# Patient Record
Sex: FEMALE | Race: WHITE | NOT HISPANIC OR LATINO | Employment: OTHER | URBAN - NONMETROPOLITAN AREA
[De-identification: names, ages, dates, MRNs, and addresses within clinical notes are randomized per-mention and may not be internally consistent; named-entity substitution may affect disease eponyms.]

---

## 2020-07-14 ENCOUNTER — TRANSCRIBE ORDERS (OUTPATIENT)
Dept: ADMINISTRATIVE | Facility: HOSPITAL | Age: 65
End: 2020-07-14

## 2020-07-14 DIAGNOSIS — Z01.818 PREOP TESTING: Primary | ICD-10-CM

## 2020-07-17 ENCOUNTER — LAB (OUTPATIENT)
Dept: LAB | Facility: HOSPITAL | Age: 65
End: 2020-07-17

## 2020-07-17 PROCEDURE — C9803 HOPD COVID-19 SPEC COLLECT: HCPCS | Performed by: PAIN MEDICINE

## 2020-07-17 PROCEDURE — U0003 INFECTIOUS AGENT DETECTION BY NUCLEIC ACID (DNA OR RNA); SEVERE ACUTE RESPIRATORY SYNDROME CORONAVIRUS 2 (SARS-COV-2) (CORONAVIRUS DISEASE [COVID-19]), AMPLIFIED PROBE TECHNIQUE, MAKING USE OF HIGH THROUGHPUT TECHNOLOGIES AS DESCRIBED BY CMS-2020-01-R: HCPCS | Performed by: PAIN MEDICINE

## 2020-07-18 LAB
COVID LABCORP PRIORITY: NORMAL
SARS-COV-2 RNA RESP QL NAA+PROBE: NOT DETECTED

## 2020-10-09 ENCOUNTER — HOSPITAL ENCOUNTER (EMERGENCY)
Age: 65
Discharge: HOME | End: 2020-10-09
Attending: PEDIATRICS
Payer: MEDICARE

## 2020-10-09 ENCOUNTER — APPOINTMENT (OUTPATIENT)
Dept: GENERAL RADIOLOGY | Age: 65
End: 2020-10-09
Payer: MEDICARE

## 2020-10-09 VITALS
RESPIRATION RATE: 18 BRPM | HEART RATE: 76 BPM | WEIGHT: 200 LBS | HEIGHT: 62 IN | DIASTOLIC BLOOD PRESSURE: 82 MMHG | TEMPERATURE: 97.8 F | SYSTOLIC BLOOD PRESSURE: 171 MMHG | OXYGEN SATURATION: 99 % | BODY MASS INDEX: 36.8 KG/M2

## 2020-10-09 LAB
ALBUMIN SERPL-MCNC: 4 G/DL (ref 3.5–5.2)
ALP BLD-CCNC: 70 U/L (ref 35–104)
ALT SERPL-CCNC: 32 U/L (ref 5–33)
ANION GAP SERPL CALCULATED.3IONS-SCNC: 13 MMOL/L (ref 7–19)
AST SERPL-CCNC: 67 U/L (ref 5–32)
BACTERIA: NEGATIVE /HPF
BASOPHILS ABSOLUTE: 0 K/UL (ref 0–0.2)
BASOPHILS RELATIVE PERCENT: 0 % (ref 0–1)
BILIRUB SERPL-MCNC: 0.3 MG/DL (ref 0.2–1.2)
BILIRUBIN URINE: NEGATIVE
BLOOD, URINE: NEGATIVE
BUN BLDV-MCNC: 15 MG/DL (ref 8–23)
CALCIUM SERPL-MCNC: 9 MG/DL (ref 8.8–10.2)
CHLORIDE BLD-SCNC: 101 MMOL/L (ref 98–111)
CLARITY: CLEAR
CO2: 22 MMOL/L (ref 22–29)
COLOR: YELLOW
CREAT SERPL-MCNC: 0.7 MG/DL (ref 0.5–0.9)
CRYSTALS, UA: ABNORMAL /HPF
EOSINOPHILS ABSOLUTE: 0 K/UL (ref 0–0.6)
EOSINOPHILS RELATIVE PERCENT: 0 % (ref 0–5)
EPITHELIAL CELLS, UA: 1 /HPF (ref 0–5)
GFR AFRICAN AMERICAN: >59
GFR NON-AFRICAN AMERICAN: >60
GLUCOSE BLD-MCNC: 247 MG/DL (ref 74–109)
GLUCOSE URINE: =>1000 MG/DL
HCT VFR BLD CALC: 38.1 % (ref 37–47)
HEMOGLOBIN: 12 G/DL (ref 12–16)
HYALINE CASTS: 2 /HPF (ref 0–8)
IMMATURE GRANULOCYTES #: 0 K/UL
KETONES, URINE: ABNORMAL MG/DL
LEUKOCYTE ESTERASE, URINE: NEGATIVE
LYMPHOCYTES ABSOLUTE: 1.1 K/UL (ref 1.1–4.5)
LYMPHOCYTES RELATIVE PERCENT: 28.9 % (ref 20–40)
MCH RBC QN AUTO: 26 PG (ref 27–31)
MCHC RBC AUTO-ENTMCNC: 31.5 G/DL (ref 33–37)
MCV RBC AUTO: 82.5 FL (ref 81–99)
MONOCYTES ABSOLUTE: 0.2 K/UL (ref 0–0.9)
MONOCYTES RELATIVE PERCENT: 4.9 % (ref 0–10)
NEUTROPHILS ABSOLUTE: 2.4 K/UL (ref 1.5–7.5)
NEUTROPHILS RELATIVE PERCENT: 65.9 % (ref 50–65)
NITRITE, URINE: NEGATIVE
PDW BLD-RTO: 14.6 % (ref 11.5–14.5)
PH UA: 5 (ref 5–8)
PLATELET # BLD: 174 K/UL (ref 130–400)
PMV BLD AUTO: 9.7 FL (ref 9.4–12.3)
POTASSIUM REFLEX MAGNESIUM: 4.3 MMOL/L (ref 3.5–5)
PROTEIN UA: 30 MG/DL
RBC # BLD: 4.62 M/UL (ref 4.2–5.4)
RBC UA: 0 /HPF (ref 0–4)
SODIUM BLD-SCNC: 136 MMOL/L (ref 136–145)
SPECIFIC GRAVITY UA: 1.03 (ref 1–1.03)
TOTAL PROTEIN: 7.5 G/DL (ref 6.6–8.7)
UROBILINOGEN, URINE: 0.2 E.U./DL
WBC # BLD: 3.7 K/UL (ref 4.8–10.8)
WBC UA: 1 /HPF (ref 0–5)

## 2020-10-09 PROCEDURE — 81001 URINALYSIS AUTO W/SCOPE: CPT

## 2020-10-09 PROCEDURE — 80053 COMPREHEN METABOLIC PANEL: CPT

## 2020-10-09 PROCEDURE — 2580000003 HC RX 258: Performed by: PEDIATRICS

## 2020-10-09 PROCEDURE — 36415 COLL VENOUS BLD VENIPUNCTURE: CPT

## 2020-10-09 PROCEDURE — 85025 COMPLETE CBC W/AUTO DIFF WBC: CPT

## 2020-10-09 PROCEDURE — 71045 X-RAY EXAM CHEST 1 VIEW: CPT

## 2020-10-09 PROCEDURE — 99999 PR OFFICE/OUTPT VISIT,PROCEDURE ONLY: CPT | Performed by: PEDIATRICS

## 2020-10-09 PROCEDURE — 6360000002 HC RX W HCPCS: Performed by: PEDIATRICS

## 2020-10-09 PROCEDURE — 96374 THER/PROPH/DIAG INJ IV PUSH: CPT

## 2020-10-09 PROCEDURE — 99282 EMERGENCY DEPT VISIT SF MDM: CPT

## 2020-10-09 RX ORDER — AZITHROMYCIN 250 MG/1
250 TABLET, FILM COATED ORAL SEE ADMIN INSTRUCTIONS
Qty: 6 TABLET | Refills: 0 | Status: SHIPPED | OUTPATIENT
Start: 2020-10-09 | End: 2020-10-14

## 2020-10-09 RX ORDER — DEXAMETHASONE SODIUM PHOSPHATE 10 MG/ML
6 INJECTION, SOLUTION INTRAMUSCULAR; INTRAVENOUS ONCE
Status: COMPLETED | OUTPATIENT
Start: 2020-10-09 | End: 2020-10-09

## 2020-10-09 RX ORDER — 0.9 % SODIUM CHLORIDE 0.9 %
1000 INTRAVENOUS SOLUTION INTRAVENOUS ONCE
Status: COMPLETED | OUTPATIENT
Start: 2020-10-09 | End: 2020-10-09

## 2020-10-09 RX ADMIN — SODIUM CHLORIDE 1000 ML: 9 INJECTION, SOLUTION INTRAVENOUS at 20:48

## 2020-10-09 RX ADMIN — DEXAMETHASONE SODIUM PHOSPHATE 6 MG: 10 INJECTION INTRAMUSCULAR; INTRAVENOUS at 20:48

## 2020-10-09 NOTE — ED NOTES
Bed: 05  Expected date:   Expected time:   Means of arrival:   Comments:  covid pt coming       Aleksey Price RN  10/09/20 8169

## 2020-10-10 ENCOUNTER — CARE COORDINATION (OUTPATIENT)
Dept: CARE COORDINATION | Age: 65
End: 2020-10-10

## 2020-10-10 NOTE — CARE COORDINATION
Patient contacted regarding [id number] diagnosis\". Discussed COVID-19 related testing which was available at this time. Test results were positive. Patient informed of results, if available? Yes, already aware    Care Transition Nurse/ Ambulatory Care Manager contacted the patient by telephone to perform post discharge assessment. Call within 2 business days of discharge: Yes. Verified name and  with patient as identifiers. Provided introduction to self, and explanation of the CTN/ACM role, and reason for call due to risk factors for infection and/or exposure to COVID-19. Symptoms reviewed with patient who verbalized the following symptoms: fever, cough, shortness of breath, vomiting and diarrhea. Due to worsening symptoms encounter was routed to provider for escalation. Discussed follow-up appointments. If no appointment was previously scheduled, appointment scheduling offered: Pt direct admit to [facility] per PCP  [facility] follow up appointment(s): No future appointments. Non-[facility] follow up appointment(s): N/A    Non-face-to-face services provided:  Provided my contact information and enc pt to reach out with any needs during or after hospitalization     Advance Care Planning:   Does patient have an Advance Directive:  patient declined education. Patient has following risk factors of: COPD and diabetes. CTN/ACM reviewed discharge instructions, medical action plan and red flags such as increased shortness of breath, increasing fever and signs of decompensation with patient who verbalized understanding. Discussed exposure protocols and quarantine with CDC Guidelines What to do if you are sick with coronavirus disease 2019.  Patient was given an opportunity for questions and concerns. The patient agrees to contact the [facility] exposure line [phone], local [facility] department [address] [place] [address] [phone] and PCP office for questions related to their healthcare.  CTN/ACM

## 2020-10-13 ASSESSMENT — ENCOUNTER SYMPTOMS
VOMITING: 0
SHORTNESS OF BREATH: 1
EYE DISCHARGE: 0
ABDOMINAL PAIN: 0
BACK PAIN: 0
COLOR CHANGE: 0
CHEST TIGHTNESS: 1
RHINORRHEA: 0
COUGH: 1
NAUSEA: 0

## 2020-10-13 NOTE — ED PROVIDER NOTES
[facility]  eMERGENCY dEPARTMENT eNCOUnter      Pt Name: Sagrario Reed  MRN: [id number]  [place] 1955  Date of evaluation: 10/9/2020  Provider: Olam Gottron, MD    CHIEF COMPLAINT       Chief Complaint   Patient presents with    Concern For COVID-19     covid positive, states that she is getting worse    Shortness of Breath     o2 sats 99% room air. HISTORY OF PRESENT ILLNESS   (Location/Symptom, Timing/Onset,Context/Setting, Quality, Duration, Modifying Factors, Severity)  Note limiting factors. Sagrario Reed is a 77 y.o. female who presents to the emergency department with shortness of breath. Patient states she has been recently diagnosed with COVID-19. Patient states she has been having difficulty breathing. Patient has a history of COPD/asthma. Patient states that she can hear herself wheezing. Patient states she has been trying to drink sufficient amount of fluids. Patient has been using albuterol. HPI    NursingNotes were reviewed. REVIEW OF SYSTEMS    (2-9 systems for level 4, 10 or more for level 5)     Review of Systems   Constitutional: Positive for fatigue. Negative for chills and fever. HENT: Positive for congestion. Negative for rhinorrhea. Eyes: Negative for discharge. Respiratory: Positive for cough, chest tightness and shortness of breath. Cardiovascular: Negative for chest pain and palpitations. Gastrointestinal: Negative for abdominal pain, nausea and vomiting. Genitourinary: Negative for difficulty urinating and dysuria. Musculoskeletal: Negative for back pain and neck pain. Skin: Negative for color change and pallor. Neurological: Negative for syncope and light-headedness. Psychiatric/Behavioral: Negative for agitation and confusion. All other systems reviewed and are negative.            PAST MEDICALHISTORY     Past Medical History:   Diagnosis Date    Asthma     COPD [facility]ch[facility] obstructive pulmonary disease) [facility] [address]     DDD (degenerative disc disease)     DM (diabetes mellitus) ([facility] [address]     type II    HTN (hypertension)     Hyperthyroidism     Hypothyroidism     Obesity     Sleep apnea          SURGICAL HISTORY       Past Surgical History:   Procedure Laterality Date    DIAGNOSTIC CARDIAC CATH LAB PROCEDURE  11/20/01         CURRENT MEDICATIONS     Discharge Medication List as of 10/9/2020  9:07 PM      CONTINUE these medications which have NOT CHANGED    Details   Insulin Detemir (LEVEMIR SC) Inject into the skinHistorical Med      nebivolol (BYSTOLIC) 10 MG tablet Take  by mouth daily. Historical Med      metformin (GLUCOPHAGE) 1000 MG tablet Take 1,000 mg by mouth 2 times daily (with meals). Historical Med      sitagliptan (JANUVIA) 100 MG tablet Take 100 mg by mouth daily. Historical Med      levothyroxine (SYNTHROID) 150 MCG tablet Take 150 mcg by mouth daily. Historical Med      gabapentin (NEURONTIN) 800 MG tablet Take 800 mg by mouth 3 times daily. Historical Med      triamterene-hydrochlorothiazide (DYAZIDE) 37.5-25 MG per capsule Take 1 capsule by mouth every morning. Historical Med      Multiple Vitamins-Minerals (CENTRUM SILVER PO) Take  by mouth. Historical Med      fish oil-omega-3 fatty acids 1000 MG capsule Take 2 g by mouth daily. Historical Med      Calcium + D (CALTRATE) 600-200 MG-UNIT tablet Take 1 tablet by mouth daily. Historical Med             ALLERGIES     Niaspan [niacin]    FAMILY HISTORY       Family History   Problem Relation Age of Onset    Coronary Art Dis Mother     Hypertension Mother     Stroke Mother     Cancer Mother     Coronary Art Dis Father     Diabetes Father     Hypertension Father     Cancer Father           SOCIAL HISTORY       Social History     Socioeconomic History    Marital status:       Spouse name: None    Number of children: None    Years of education: None    Highest education level: None   Occupational History    None   Social Needs    Financial normal.      Breath sounds: Wheezing present. Comments: Diminished air entry bilaterally without wheezes. No increased work of breathing. Saturations are 99% on room air. Abdominal:      General: Bowel sounds are normal.      Palpations: Abdomen is soft. Tenderness: There is no abdominal tenderness. There is no guarding. Musculoskeletal:         General: No tenderness or deformity. Skin:     General: Skin is warm and dry. Capillary Refill: Capillary refill takes less than 2 seconds. Coloration: Skin is not jaundiced. Neurological:      General: No focal deficit present. Mental Status: She is alert and oriented to person, place, and time. Mental status is at baseline. Coordination: Coordination normal.   Psychiatric:         Mood and Affect: Mood normal.         Behavior: Behavior normal.         DIAGNOSTIC RESULTS     RADIOLOGY:  Non-plain film images such as CT, Ultrasound and MRI are read by the radiologist. Plain radiographic images are visualized and preliminarily interpreted bythe emergency physician with the below findings:      XR CHEST PORTABLE   Final Result   Impression:   1. Low lung volumes. 2.  Nonspecific coarsening of interstitium, mostly in the lower lung   zones, may be seen in Covid 19 infection.    Signed by Dr Kassy Padgett on 10/9/2020 7:32 PM              LABS:  Labs Reviewed   CBC WITH AUTO DIFFERENTIAL - Abnormal; Notable for the following components:       Result Value    WBC 3.7 (*)     MCH 26.0 (*)     MCHC 31.5 (*)     RDW 14.6 (*)     Neutrophils % 65.9 (*)     All other components within normal limits   COMPREHENSIVE METABOLIC PANEL W/ REFLEX TO MG FOR LOW K - Abnormal; Notable for the following components:    Glucose 247 (*)     AST 67 (*)     All other components within normal limits   URINE RT REFLEX TO CULTURE - Abnormal; Notable for the following components:    Ketones, Urine TRACE (*)     Protein, UA 30 (*)     All other components within normal limits   MICROSCOPIC URINALYSIS - Abnormal; Notable for the following components:    Bacteria, UA NEGATIVE (*)     Crystals, UA NEG (*)     All other components within normal limits       All other labs were within normal range or not returned as of this dictation. EMERGENCY DEPARTMENT COURSE and DIFFERENTIAL DIAGNOSIS/MDM:   Vitals:    Vitals:    10/09/20 1812   BP: (!) 171/82   Pulse: 76   Resp: 18   Temp: 97.8 °F (36.6 °C)   SpO2: 99%   Weight: 200 lb (90.7 kg)   Height: 5' 2\" (1.575 m)       MDM  70-year-old female with history of asthma/COPD presents with shortness of breath secondary to COVID-19. Lab and radiology results reviewed. Patient given DuoNeb and Decadron in the emergency department. Patient saturations are 99% on room air. Patient will be discharged home to follow-up with Dr. Savage Gloria. Patient will return with chest pain, difficulty breathing, or other concerns. CONSULTS:  None    PROCEDURES:  Unless otherwise noted below, none     Procedures    FINAL IMPRESSION      1. COVID-19 virus infection    2. Pneumonia due to COVID-19 virus          DISPOSITION/PLAN   DISPOSITION Decision To Discharge 10/09/2020 08:43:34 PM      PATIENT REFERRED TO:  No follow-up provider specified.     DISCHARGE MEDICATIONS:  Discharge Medication List as of 10/9/2020  9:07 PM      START taking these medications    Details   azithromycin (ZITHROMAX) 250 MG tablet Take 1 tablet by mouth See Admin Instructions for 5 days 500mg on day 1 followed by 250mg on days 2 - 5, Disp-6 tablet,R-0Print      albuterol (PROVENTIL) (5 MG/ML) 0.5% nebulizer solution Take 0.5 mLs by nebulization every 6 hours as needed for Wheezing, Disp-120 each,R-0Print                (Please note that portions of this note were completed with a voice recognition program.  Efforts were made to edit thedictations but occasionally words are mis-transcribed.)    Fanny Negrete MD (electronically signed)  Attending Emergency Physician

## 2020-10-16 ENCOUNTER — CARE COORDINATION (OUTPATIENT)
Dept: CARE COORDINATION | Age: 65
End: 2020-10-16

## 2022-10-25 ENCOUNTER — OFFICE VISIT (OUTPATIENT)
Dept: GASTROENTEROLOGY | Facility: CLINIC | Age: 67
End: 2022-10-25

## 2022-10-25 VITALS
DIASTOLIC BLOOD PRESSURE: 64 MMHG | TEMPERATURE: 94.4 F | WEIGHT: 236 LBS | HEIGHT: 60 IN | SYSTOLIC BLOOD PRESSURE: 142 MMHG | HEART RATE: 67 BPM | OXYGEN SATURATION: 97 % | BODY MASS INDEX: 46.33 KG/M2

## 2022-10-25 DIAGNOSIS — R13.14 PHARYNGOESOPHAGEAL DYSPHAGIA: Primary | ICD-10-CM

## 2022-10-25 DIAGNOSIS — D68.9 COAGULOPATHY: ICD-10-CM

## 2022-10-25 DIAGNOSIS — Z86.010 HX OF COLONIC POLYP: ICD-10-CM

## 2022-10-25 DIAGNOSIS — Z83.71 FAMILY HX COLONIC POLYPS: ICD-10-CM

## 2022-10-25 PROCEDURE — 99204 OFFICE O/P NEW MOD 45 MIN: CPT | Performed by: NURSE PRACTITIONER

## 2022-10-25 NOTE — PROGRESS NOTES
"[facility] GASTROENTEROLOGY - OFFICE NOTE    10/25/2022    Aida ROSS   1955    Primary Physician: Chey Catherine APRN    Chief Complaint   Patient presents with   • Difficulty Swallowing         HISTORY OF PRESENT ILLNESS:     Aida ROSS is a 67 y.o. female presents with dysphagia. This started 6 mo ago. She points to the neck area where meds and solid foods will hang. Taking a drink does not help. Has had to cough to get relief.  Takes protonix daily that does control reflux. No weight loss. No hematemesis.   No fever.     She is on eliquis. Prescribed by Chey Catherine NP.     She has history gastric sleeve 2014.       Last egd was around 3 year ago done in [place]. That was \" ok\" then.   Last colonoscopy 3 year ago in [place] and was \" ok \" then. She has had colon polyps in the past. Recommended repeat colonoscopy 10 year.   No family history of colon cancer.   Father had colon polyps.        Past Medical History:   Diagnosis Date   • Atrial fibrillation (HCC)    • Coughing blood    • Diabetes mellitus (HCC)    • Disease of thyroid gland    • Food in larynx causing asphyxiation, initial encounter    • GERD (gastroesophageal reflux disease)    • Hyperlipidemia    • Restless leg    • Vitamin B deficiency    • Vitamin D deficiency        Past Surgical History:   Procedure Laterality Date   • GASTRIC SLEEVE LAPAROSCOPIC     • TUBAL ABDOMINAL LIGATION         Outpatient Medications Marked as Taking for the 10/25/22 encounter (Office Visit) with Alma Richard APRN   Medication Sig Dispense Refill   • ALBUTEROL IN Inhale.     • amiodarone (PACERONE) 100 MG tablet Take 100 mg by mouth Daily.     • apixaban (ELIQUIS) 5 MG tablet tablet Take 5 mg by mouth 2 (Two) Times a Day.     • atorvastatin (LIPITOR) 40 MG tablet Take 40 mg by mouth Daily.     • B Complex Vitamins (VITAMIN B-COMPLEX PO) Take  by mouth Daily.     • CALCIUM PO Take  by mouth Daily.     • carvedilol (COREG) 25 MG tablet Take 25 mg " by mouth 2 (Two) Times a Day With Meals.     • Cholecalciferol (VITAMIN D3 PO) Take  by mouth.     • DULoxetine (CYMBALTA) 30 MG capsule Take 30 mg by mouth Daily.     • ezetimibe (ZETIA) 10 MG tablet Take 10 mg by mouth Daily.     • ferrous sulfate 325 (65 FE) MG tablet Take 325 mg by mouth Daily With Breakfast.     • furosemide (LASIX) 20 MG tablet Take 20 mg by mouth Daily.     • gabapentin (NEURONTIN) 300 MG capsule Take 300 mg by mouth 3 (Three) Times a Day.     • HYDROcodone-acetaminophen (NORCO)  MG per tablet Take 1 tablet by mouth Every 6 (Six) Hours As Needed for Moderate Pain.     • insulin detemir (LEVEMIR) 100 UNIT/ML injection Inject  under the skin into the appropriate area as directed 2 (Two) Times a Day.     • isosorbide mononitrate (IMDUR) 30 MG 24 hr tablet Take 30 mg by mouth Daily.     • levothyroxine (SYNTHROID, LEVOTHROID) 88 MCG tablet Take 88 mcg by mouth Daily.     • nitroglycerin (NITROSTAT) 0.3 MG SL tablet Place 0.3 mg under the tongue Every 5 (Five) Minutes As Needed for Chest Pain. Take no more than 3 doses in 15 minutes.     • pantoprazole (PROTONIX) 40 MG EC tablet Take 40 mg by mouth Daily.     • rOPINIRole (REQUIP) 0.5 MG tablet Take 0.5 mg by mouth Every Night. Take 1 hour before bedtime.     • SITagliptin (JANUVIA) 100 MG tablet Take 100 mg by mouth Daily.     • vitamin D (ERGOCALCIFEROL) 1.25 MG (08800 UT) capsule capsule Take 50,000 Units by mouth 1 (One) Time Per Week.         No Known Allergies    Social History     Socioeconomic History   • Marital status:    Tobacco Use   • Smoking status: Never   • Smokeless tobacco: Never   Substance and Sexual Activity   • Alcohol use: Not Currently   • Drug use: Not Currently   • Sexual activity: Defer       Family History   Problem Relation Age of Onset   • Colon polyps Father    • Prostate cancer Father    • Colon cancer Neg Hx        Review of Systems   Constitutional: Negative for chills, fever and unexpected weight  "change.   HENT: Positive for trouble swallowing.    Respiratory: Negative for shortness of breath.    Cardiovascular: Negative for chest pain.   Gastrointestinal: Negative for abdominal distention, abdominal pain, anal bleeding, blood in stool, constipation, diarrhea, nausea and vomiting.        Vitals:    10/25/22 0822   BP: 142/64   Pulse: 67   Temp: 94.4 °F (34.7 °C)   SpO2: 97%   Weight: 107 kg (236 lb)   Height: 152.4 cm (60\")      Body mass index is 46.09 kg/m².    Physical Exam  Vitals reviewed.   Constitutional:       General: She is not in acute distress.  Cardiovascular:      Rate and Rhythm: Normal rate and regular rhythm.      Heart sounds: Normal heart sounds.   Pulmonary:      Effort: Pulmonary effort is normal.      Breath sounds: Normal breath sounds.   Abdominal:      General: Bowel sounds are normal. There is no distension.      Palpations: Abdomen is soft.      Tenderness: There is no abdominal tenderness.   Skin:     General: Skin is warm and dry.   Neurological:      Mental Status: She is alert.         Results for orders placed or performed in visit on 07/14/20   COVID-19,[facility] ROUTINE, NP/OP SWAB IN TRANSPORT MEDIA OR ESWAB 72 HR TAT - Swab, Oropharynx    Specimen: Oropharynx; Swab   Result Value Ref Range    SARS-CoV-2, NIKOLE Not Detected Not Detected   COVID [facility] Priority - Swab, Oropharynx    Specimen: Oropharynx; Swab   Result Value Ref Range    COVID [facility] PRIORITY Comment            ASSESSMENT AND PLAN    Assessment & Plan     Diagnoses and all orders for this visit:    1. Pharyngoesophageal dysphagia (Primary)    2. Hx of colonic polyp    3. Family hx colonic polyps    4. Coagulopathy (HCC)  Comments:  eliquis.               In regards to dysphagia, differential diagnosis discussed.  I recommend cut food in small pieces and chew well.  I recommend upper endoscopy for further evaluation and the patient is agreeable.  However prior to scheduling I will send a letter to Chey Catherine " NP  in regards to if the patient can safely hold eliquis  48 hours days prior to procedure. I will contact patient once he has responded.  I have instructed the patient to contact our office if she has not heard from us within 2 weeks.           The patient was advised on the risks of stopping blood thinners for a procedure.  The risks discussed included the risk of developing myocardial infarction, CVA, embolus, clogging of the arteries or stents, etc.  We discussed the potential consequences of the risks discussed.  The benefits of stopping as well as the alternatives were discussed as well.   Patient is to hold their anticoagulation medication per the direction of their prescribing physician, Chey Catherine NP.    A letter will be sent to prescribing This is to prevent any risk or complication from bleeding intra and post procedure. If they develop bleeding post procedure they are to go the emergency department for further evaluation and treatment immediately.         Last colonoscopy was 3 years ago.  We will request last colonoscopy with path report for review.  She was placed on 10-year recall for colonoscopy.         CHAIM Leyva    Received records :   Colonoscopy 8-7-19  By Dr. Edson Blood , this was to the proximal ascending colon , colon was elongated and tortuous.  Recommended colonoscopy in 5 years.

## 2022-10-26 ENCOUNTER — TELEPHONE (OUTPATIENT)
Dept: GASTROENTEROLOGY | Facility: CLINIC | Age: 67
End: 2022-10-26

## 2022-11-15 ENCOUNTER — OFFICE VISIT (OUTPATIENT)
Dept: CARDIOLOGY | Facility: CLINIC | Age: 67
End: 2022-11-15

## 2022-11-15 VITALS
BODY MASS INDEX: 45.94 KG/M2 | DIASTOLIC BLOOD PRESSURE: 77 MMHG | SYSTOLIC BLOOD PRESSURE: 132 MMHG | OXYGEN SATURATION: 95 % | HEIGHT: 60 IN | WEIGHT: 234 LBS | HEART RATE: 57 BPM

## 2022-11-15 DIAGNOSIS — E11.65 UNCONTROLLED TYPE 2 DIABETES MELLITUS WITH HYPERGLYCEMIA: ICD-10-CM

## 2022-11-15 DIAGNOSIS — E78.2 MIXED HYPERLIPIDEMIA: ICD-10-CM

## 2022-11-15 DIAGNOSIS — R07.9 CHEST PAIN IN ADULT: ICD-10-CM

## 2022-11-15 DIAGNOSIS — R06.09 DOE (DYSPNEA ON EXERTION): ICD-10-CM

## 2022-11-15 DIAGNOSIS — Z87.892: Primary | ICD-10-CM

## 2022-11-15 DIAGNOSIS — E66.01 MORBID OBESITY WITH BMI OF 40.0-44.9, ADULT: ICD-10-CM

## 2022-11-15 DIAGNOSIS — R13.19 OTHER DYSPHAGIA: ICD-10-CM

## 2022-11-15 DIAGNOSIS — R00.2 PALPITATIONS: ICD-10-CM

## 2022-11-15 DIAGNOSIS — R94.39 ABNORMAL NUCLEAR STRESS TEST: ICD-10-CM

## 2022-11-15 PROCEDURE — 93000 ELECTROCARDIOGRAM COMPLETE: CPT | Performed by: INTERNAL MEDICINE

## 2022-11-15 PROCEDURE — 99204 OFFICE O/P NEW MOD 45 MIN: CPT | Performed by: INTERNAL MEDICINE

## 2022-11-15 RX ORDER — BUDESONIDE AND FORMOTEROL FUMARATE DIHYDRATE 160; 4.5 UG/1; UG/1
2 AEROSOL RESPIRATORY (INHALATION)
COMMUNITY

## 2022-11-15 NOTE — PROGRESS NOTES
Aida ROSS  [id number]  1955  67 y.o.  female    Referring Provider: Chey Catherine APRN    Reason for  Visit:  Initial visit         Subjective     Chest pain both with exertion as well as at rest.  Feels episodes of chest pain occur no more often with exertion  Moderate substernal,   Pressure like   Chest pain non pleuritic  Chest pain non positional and non gustatory   Lasts less than 5 minutes to hours    Started more than 2 years ago  Occurs once or twice a month on the average but can be variable in frequency  Associated diaphoresis  Associated nausea  Radiation to left arm      Relieved with rest or spontaneously  Not positional    No change with intake of food or antacids  No change with breathing  Mild to moderate associated dyspnea    No similar chest pain episodes in the past     Intermittent palpitations, once every several days to several weeks lasting for less than 1 minute  No associated symptoms of dizziness, weakness, chest pain,  shortness of breath      Easy fatiguability and increasing tired  Feels energy levels running low      No bleeding, excessive bruising, gait instability or fall risks    Has moderate snoring with daytime fatigue    Intolerant to CPAP         History of present illness:  Aida ROSS is a 67 y.o. yo female with Type 2 diabetes mellitus  who presents today for   Chief Complaint   Patient presents with   • Establish Care     NEW PT:CHEY RUCKER   • Atrial Fibrillation   • Chest Pain     That radiated to left arm. Light pressure on chest.   .    History  Past Medical History:   Diagnosis Date   • Atrial fibrillation (HCC)    • Coughing blood    • Diabetes mellitus (HCC)    • Disease of thyroid gland    • Food in larynx causing asphyxiation, initial encounter    • GERD (gastroesophageal reflux disease)    • Hyperlipidemia    • Restless leg    • Vitamin B deficiency    • Vitamin D deficiency    ,   Past Surgical History:   Procedure Laterality Date   • GASTRIC  SLEEVE LAPAROSCOPIC     • TUBAL ABDOMINAL LIGATION     ,   Family History   Problem Relation Age of Onset   • Hypertension Mother    • Heart disease Mother    • Hypertension Father    • Heart disease Father    • Colon polyps Father    • Prostate cancer Father    • Hypertension Sister    • Heart attack Sister    • Colon cancer Neg Hx    ,   Social History     Tobacco Use   • Smoking status: Never   • Smokeless tobacco: Never   Vaping Use   • Vaping Use: Never used   Substance Use Topics   • Alcohol use: Not Currently   • Drug use: Not Currently   ,     Medications  Current Outpatient Medications   Medication Sig Dispense Refill   • ALBUTEROL IN Inhale.     • amiodarone (PACERONE) 100 MG tablet Take 100 mg by mouth Daily.     • apixaban (ELIQUIS) 5 MG tablet tablet Take 5 mg by mouth 2 (Two) Times a Day.     • atorvastatin (LIPITOR) 40 MG tablet Take 80 mg by mouth Every Night.     • B Complex Vitamins (VITAMIN B-COMPLEX PO) Take  by mouth Daily.     • budesonide-formoterol (SYMBICORT) 160-4.5 MCG/ACT inhaler Inhale 2 puffs 2 (Two) Times a Day.     • CALCIUM PO Take  by mouth Daily.     • carvedilol (COREG) 25 MG tablet Take 25 mg by mouth 2 (Two) Times a Day With Meals.     • Cholecalciferol (VITAMIN D3 PO) Take  by mouth.     • DULoxetine (CYMBALTA) 30 MG capsule Take 30 mg by mouth Daily.     • ezetimibe (ZETIA) 10 MG tablet Take 10 mg by mouth Daily.     • ferrous sulfate 325 (65 FE) MG tablet Take 325 mg by mouth Daily With Breakfast.     • furosemide (LASIX) 20 MG tablet Take 20 mg by mouth Daily.     • gabapentin (NEURONTIN) 300 MG capsule Take 300 mg by mouth 3 (Three) Times a Day.     • HYDROcodone-acetaminophen (NORCO)  MG per tablet Take 1 tablet by mouth Every 6 (Six) Hours As Needed for Moderate Pain.     • insulin detemir (LEVEMIR) 100 UNIT/ML injection Inject  under the skin into the appropriate area as directed 2 (Two) Times a Day.     • isosorbide mononitrate (IMDUR) 30 MG 24 hr tablet Take 30  "mg by mouth Daily.     • levothyroxine (SYNTHROID, LEVOTHROID) 88 MCG tablet Take 88 mcg by mouth Daily.     • nitroglycerin (NITROSTAT) 0.3 MG SL tablet Place 0.3 mg under the tongue Every 5 (Five) Minutes As Needed for Chest Pain. Take no more than 3 doses in 15 minutes.     • pantoprazole (PROTONIX) 40 MG EC tablet Take 40 mg by mouth Daily.     • rOPINIRole (REQUIP) 0.5 MG tablet Take 0.5 mg by mouth Every Night. Take 1 hour before bedtime.     • Semaglutide,0.25 or 0.5MG/DOS, (Ozempic, 0.25 or 0.5 MG/DOSE,) 2 MG/1.5ML solution pen-injector Inject  under the skin into the appropriate area as directed 1 (One) Time Per Week.     • SITagliptin (JANUVIA) 100 MG tablet Take 100 mg by mouth Daily.     • vitamin D (ERGOCALCIFEROL) 1.25 MG (71132 UT) capsule capsule Take 50,000 Units by mouth 1 (One) Time Per Week.       No current facility-administered medications for this visit.       Allergies:  Isosorb dinitrate-hydralazine and Niacin    Review of Systems  Review of Systems   Constitutional: Negative.   HENT: Negative.    Eyes: Negative.    Cardiovascular: Positive for chest pain, dyspnea on exertion and palpitations. Negative for claudication, cyanosis, irregular heartbeat, leg swelling, near-syncope, orthopnea, paroxysmal nocturnal dyspnea and syncope.   Respiratory: Positive for snoring.    Endocrine: Negative.    Hematologic/Lymphatic: Negative.    Skin: Negative.    Musculoskeletal: Positive for arthritis, back pain and joint pain.   Gastrointestinal: Negative for anorexia.   Genitourinary: Negative.    Neurological: Negative.    Psychiatric/Behavioral: Negative.        Objective     Physical Exam:  /77 (BP Location: Left arm, Patient Position: Sitting, Cuff Size: Large Adult)   Pulse 57   Ht 152.4 cm (60\")   Wt 106 kg (234 lb)   SpO2 95%   BMI 45.70 kg/m²     Physical Exam  Constitutional:       Appearance: She is well-developed.   HENT:      Head: Normocephalic.   Neck:      Vascular: Normal " carotid pulses. No carotid bruit or JVD.      Trachea: No tracheal tenderness or tracheal deviation.   Cardiovascular:      Rate and Rhythm: Regular rhythm.      Pulses: Normal pulses.      Heart sounds: Normal heart sounds.   Pulmonary:      Effort: Pulmonary effort is normal.      Breath sounds: No stridor.   Abdominal:      Palpations: Abdomen is soft.   Musculoskeletal:      Cervical back: No edema.   Skin:     General: Skin is warm.   Neurological:      Mental Status: She is alert.      Cranial Nerves: No cranial nerve deficit.      Sensory: No sensory deficit.   Psychiatric:         Speech: Speech normal.         Behavior: Behavior normal.         Results Review:      Stress Test With PET Myocardial Perfusion  Order: [id number]  [address] [address]                                     [phone]     Rb-82 PET/CT Regadenoson (Lexiscan) Myocardial Perfusion Imaging     Patient:   Aida Benitez              Study Date:        2021   MRN:       [id number]                         Acct#:             [id number]   :       1955 (65yrs)              Order#:            [id number]   Gender:    F                               Pt Location:       Outpatient ( )     Ref Phys:  Thomas Wise MD, Vadzim MD   Ord Phys:  Thomas Wise MD   Int Phys:  Yoav Calero MD     Reason for Exam:     --------------------------------------------------------------------------------   INDICATIONS:  Elevated Troponin Level.  Dyspnea.     --------------------------------------------------------------------------------   HISTORY:   PMH:  atrial fibrillation, COPD, Bronchitis, Home Oxygen Use.  Risk   factors:  Family history of coronary artery disease. Hypertension. Diabetes   mellitus. Dyslipidemia.  Medications:  Beta blockers.      --------------------------------------------------------------------------------   IMPRESSION:     1. Negative ECG response to stress.   2. Study suggests myocardial infarction, affecting a moderate-sized regionin the      territory of the left anterior descending coronary artery.   3. There is mild perez-infarct ischemia in the same territory.   4. Normal left ventricular systolic function, calculated post stress ejection      fraction is 61 % .   5. Abnormal gobal myocardial flow reserve at 1.8 with left anterior descending      coronary territory having flow of 1.6.   6. Coronary calcifications noted.   7. Overall intermediate risk study.        ____________________________________________________________________________________________________________________________________________  Health maintenance and recommendations    Low salt/ HTN/ Heart healthy carbohydrate restricted cardiac diet   The patient is advised to reduce or avoid caffeine or other cardiac stimulants.   Minimize or avoid  NSAID-type medications      Monitor for any signs of bleeding including red or dark stools. Fall precautions.   Advised staying uptodate with immunizations per established standard guidelines.    Offered to give patient  a copy of my notes     Questions were encouraged, asked and answered to the patient's  understanding and satisfaction. Questions if any regarding current medications and side effects, need for refills and importance of compliance to medications stressed.    Reviewed available prior notes, consults, prior visits, laboratory findings, radiology and cardiology relevant reports. Updated chart as applicable. I have reviewed the patient's medical history in detail and updated the computerized patient record as relevant.      Updated patient regarding any new or relevant abnormalities on review of records or any new findings on physical exam. Mentioned to patient about purpose of visit and desirable health short  and long term goals and objectives.    Primary to monitor CBC CMP Lipid panel and TSH as applicable    ___________________________________________________________________________________________________________________________________________     ECG 12 Lead    Date/Time: 11/15/2022 10:00 AM  Performed by: Landon Denson MD  Authorized by: Landon Denson MD   Comparison: not compared with previous ECG   Rhythm: sinus bradycardia  Rate: bradycardic  Other findings: prolonged QTc interval    Clinical impression: abnormal EKG            Assessment & Plan   Diagnoses and all orders for this visit:    1. History of anaphylaxis to COVID-19 vaccine (Primary)    2. MCNEILL (dyspnea on exertion)  -     Adult Transthoracic Echo Complete w/ Color, Spectral and Contrast if necessary per protocol; Future    3. Morbid obesity with BMI of 40.0-44.9, adult ([facility])    4. Chest pain in adult  -     Stress Test With Myocardial Perfusion One Day; Future    5. Palpitations  -     Holter Monitor - 72 Hour Up To 15 Days; Future    6. Uncontrolled type 2 diabetes mellitus with hyperglycemia (HCC)    7. Mixed hyperlipidemia    8. Other dysphagia    9. Abnormal nuclear stress test    Other orders  -     ECG 12 Lead          Plan    Orders Placed This Encounter   Procedures   • Holter Monitor - 72 Hour Up To 15 Days     Standing Status:   Future     Standing Expiration Date:   11/15/2023     Order Specific Question:   Reason for exam?     Answer:   Palpitations     Order Specific Question:   Release to patient     Answer:   Routine Release     Order Specific Question:   How many days is the patient to wear the monitor?     Answer:   14   • Stress Test With Myocardial Perfusion One Day     Standing Status:   Future     Standing Expiration Date:   11/15/2023     Order Specific Question:   What stress agent will be used?     Answer:   Regadenoson (Lexiscan)     Order Specific Question:   Difficulty walking criteria?     Answer:   Musculoskeletal (hips,  knees, feet, back, amputee)     Order Specific Question:   Reason for exam?     Answer:   Chest Pain   • ECG 12 Lead     This order was created via procedure documentation     Order Specific Question:   Release to patient     Answer:   Routine Release   • Adult Transthoracic Echo Complete w/ Color, Spectral and Contrast if necessary per protocol     Standing Status:   Future     Standing Expiration Date:   11/15/2023     Scheduling Instructions:      Myocardial strain to be performed during echocardiogram as long as technically feasible     Order Specific Question:   Reason for exam?     Answer:   Dyspnea     Order Specific Question:   Release to patient     Answer:   Routine Release        Patient expressed understanding  Encouraged and answered all questions   Discussed with the patient and all questioned fully answered. She will call me if any problems arise.   Discussed results of prior testing with patient : myocardial perfusion scan    as well electrocardiogram from today       Keep A1c less than 7 Primary to monitor  Keep LDL below 70 mg/dl. Monitor liver and renal functions.   Monitor CBC, CMP, TSH (as indicated) and Lipid Panel by primary      Due to see GI for dysphagia     Intolerant to CPAP           Return in about 6 weeks (around 12/27/2022).

## 2022-11-18 ENCOUNTER — TELEPHONE (OUTPATIENT)
Dept: GASTROENTEROLOGY | Facility: CLINIC | Age: 67
End: 2022-11-18

## 2022-11-18 NOTE — TELEPHONE ENCOUNTER
Please contact Chey Catherine NP office and let them know we have faxed letter ( 10-25-22)  asking if the patient can be off of eliquis for 48 hours prior to an egd. You can get a verbal on that. Thank you

## 2022-11-21 ENCOUNTER — TELEPHONE (OUTPATIENT)
Dept: GASTROENTEROLOGY | Facility: CLINIC | Age: 67
End: 2022-11-21

## 2022-11-21 NOTE — TELEPHONE ENCOUNTER
Karen with Chey Catherine is reviewing the pts chart and she will fax our letter back to us.   To my surprise, patient's Lyme test is totally negative  Ask patient if he is any better  I expect that he is since they did not get a call from him saying that he was worse

## 2022-11-21 NOTE — TELEPHONE ENCOUNTER
Karen with Chey Catherine left a VM stating Chey referred pt to Dr Denson for a fib and Dr Denson saw her on 11/15. She is scheduled for a stress test and Chey is going to wait until Dr Denson finishes before giving the ok for pt to have procedure.

## 2022-11-22 ENCOUNTER — TELEPHONE (OUTPATIENT)
Dept: CARDIOLOGY | Facility: CLINIC | Age: 67
End: 2022-11-22

## 2022-11-22 NOTE — TELEPHONE ENCOUNTER
Caller: Aida ROSS     Relationship: SELF    Best call back number: [phone]    What is your medical concern? PATIENT WAS WEARING A HOLTER AND IT CREATED A SORE ON HER BREAST, WANTS TO KNOW IF DR. CHUN CAN SEND SOMETHING IN TO TREAT THIS AREA DUE TO HER BEING A DIABETIC. SHE WAS INSTRUCTED TO TAKE HOLTER OFF AND SEND HOLTER BACK.     How long has this issue been going on? 11/19/22    Is your provider already aware of this issue? NO    Have you been treated for this issue? NO

## 2022-11-22 NOTE — TELEPHONE ENCOUNTER
"Call returned to patient. She has not mailed monitor as \"the light is still blinking\". I have instructed to mail heart monitor. Keep area where monitor was on skin washed with mild non perfumed soap, keep clean and dry, do not apply adhesive to skin, apply triple antibiotic,cover with gauze, monitor for discolored drainage.V/U  "

## 2022-12-16 ENCOUNTER — HOSPITAL ENCOUNTER (OUTPATIENT)
Dept: CARDIOLOGY | Facility: HOSPITAL | Age: 67
Discharge: HOME | End: 2022-12-16

## 2022-12-16 VITALS
HEIGHT: 60 IN | SYSTOLIC BLOOD PRESSURE: 142 MMHG | DIASTOLIC BLOOD PRESSURE: 58 MMHG | BODY MASS INDEX: 45.94 KG/M2 | WEIGHT: 234 LBS

## 2022-12-16 VITALS — HEART RATE: 54 BPM | SYSTOLIC BLOOD PRESSURE: 146 MMHG | DIASTOLIC BLOOD PRESSURE: 53 MMHG

## 2022-12-16 DIAGNOSIS — R07.9 CHEST PAIN IN ADULT: ICD-10-CM

## 2022-12-16 DIAGNOSIS — R06.09 DOE (DYSPNEA ON EXERTION): ICD-10-CM

## 2022-12-16 LAB
BH CV NUCLEAR PRIOR STUDY: 3
BH CV REST NUCLEAR ISOTOPE DOSE: 10.6 MCI
BH CV STRESS BP STAGE 1: NORMAL
BH CV STRESS COMMENTS STAGE 1: NORMAL
BH CV STRESS DOSE REGADENOSON STAGE 1: 0.4
BH CV STRESS DURATION MIN STAGE 1: 0
BH CV STRESS DURATION SEC STAGE 1: 10
BH CV STRESS HR STAGE 1: 56
BH CV STRESS NUCLEAR ISOTOPE DOSE: 35 MCI
BH CV STRESS PROTOCOL 1: NORMAL
BH CV STRESS RECOVERY BP: NORMAL MMHG
BH CV STRESS RECOVERY HR: 47 BPM
BH CV STRESS STAGE 1: 1
LV EF NUC BP: 67 %
MAXIMAL PREDICTED HEART RATE: 151 BPM
PERCENT MAX PREDICTED HR: 37.09 %
STRESS BASELINE BP: NORMAL MMHG
STRESS BASELINE HR: 54 BPM
STRESS PERCENT HR: 44 %
STRESS POST PEAK BP: NORMAL MMHG
STRESS POST PEAK HR: 56 BPM
STRESS TARGET HR: 128 BPM

## 2022-12-16 PROCEDURE — 78452 HT MUSCLE IMAGE SPECT MULT: CPT | Performed by: INTERNAL MEDICINE

## 2022-12-16 PROCEDURE — 0 TECHNETIUM TETROFOSMIN KIT: Performed by: INTERNAL MEDICINE

## 2022-12-16 PROCEDURE — 93018 CV STRESS TEST I&R ONLY: CPT | Performed by: INTERNAL MEDICINE

## 2022-12-16 PROCEDURE — A9502 TC99M TETROFOSMIN: HCPCS | Performed by: INTERNAL MEDICINE

## 2022-12-16 PROCEDURE — 25010000002 REGADENOSON 0.4 MG/5ML SOLUTION: Performed by: INTERNAL MEDICINE

## 2022-12-16 PROCEDURE — 93306 TTE W/DOPPLER COMPLETE: CPT | Performed by: INTERNAL MEDICINE

## 2022-12-16 PROCEDURE — 25010000002 PERFLUTREN 6.52 MG/ML SUSPENSION: Performed by: INTERNAL MEDICINE

## 2022-12-16 PROCEDURE — 93306 TTE W/DOPPLER COMPLETE: CPT

## 2022-12-16 PROCEDURE — 78452 HT MUSCLE IMAGE SPECT MULT: CPT

## 2022-12-16 PROCEDURE — 93017 CV STRESS TEST TRACING ONLY: CPT

## 2022-12-16 RX ADMIN — TETROFOSMIN 1 DOSE: 1.38 INJECTION, POWDER, LYOPHILIZED, FOR SOLUTION INTRAVENOUS at 11:19

## 2022-12-16 RX ADMIN — REGADENOSON 0.4 MG: 0.08 INJECTION, SOLUTION INTRAVENOUS at 09:54

## 2022-12-16 RX ADMIN — TETROFOSMIN 1 DOSE: 1.38 INJECTION, POWDER, LYOPHILIZED, FOR SOLUTION INTRAVENOUS at 08:25

## 2022-12-16 RX ADMIN — PERFLUTREN 9.78 MG: 6.52 INJECTION, SUSPENSION INTRAVENOUS at 08:10

## 2022-12-17 LAB
BH CV ECHO MEAS - AO MAX PG: 7.3 MMHG
BH CV ECHO MEAS - AO MEAN PG: 4 MMHG
BH CV ECHO MEAS - AO ROOT DIAM: 2.6 CM
BH CV ECHO MEAS - AO V2 MAX: 135 CM/SEC
BH CV ECHO MEAS - AO V2 VTI: 34.1 CM
BH CV ECHO MEAS - AVA(I,D): 1.74 CM2
BH CV ECHO MEAS - EDV(CUBED): 103.2 ML
BH CV ECHO MEAS - EDV(MOD-SP4): 112 ML
BH CV ECHO MEAS - EF(MOD-SP4): 67.9 %
BH CV ECHO MEAS - ESV(CUBED): 14.9 ML
BH CV ECHO MEAS - ESV(MOD-SP4): 36 ML
BH CV ECHO MEAS - FS: 47.5 %
BH CV ECHO MEAS - IVS/LVPW: 1.12 CM
BH CV ECHO MEAS - IVSD: 1.14 CM
BH CV ECHO MEAS - LA DIMENSION: 4.3 CM
BH CV ECHO MEAS - LAT PEAK E' VEL: 4.9 CM/SEC
BH CV ECHO MEAS - LV DIASTOLIC VOL/BSA (35-75): 56.1 CM2
BH CV ECHO MEAS - LV MASS(C)D: 182.2 GRAMS
BH CV ECHO MEAS - LV MAX PG: 3.7 MMHG
BH CV ECHO MEAS - LV MEAN PG: 2 MMHG
BH CV ECHO MEAS - LV SYSTOLIC VOL/BSA (12-30): 18 CM2
BH CV ECHO MEAS - LV V1 MAX: 96.8 CM/SEC
BH CV ECHO MEAS - LV V1 VTI: 20.9 CM
BH CV ECHO MEAS - LVIDD: 4.7 CM
BH CV ECHO MEAS - LVIDS: 2.46 CM
BH CV ECHO MEAS - LVOT AREA: 2.8 CM2
BH CV ECHO MEAS - LVOT DIAM: 1.9 CM
BH CV ECHO MEAS - LVPWD: 1.02 CM
BH CV ECHO MEAS - MED PEAK E' VEL: 6.3 CM/SEC
BH CV ECHO MEAS - MV A MAX VEL: 113 CM/SEC
BH CV ECHO MEAS - MV DEC TIME: 0.35 MSEC
BH CV ECHO MEAS - MV E MAX VEL: 97 CM/SEC
BH CV ECHO MEAS - MV E/A: 0.86
BH CV ECHO MEAS - PA V2 MAX: 78.6 CM/SEC
BH CV ECHO MEAS - RAP SYSTOLE: 5 MMHG
BH CV ECHO MEAS - RVSP: 31.6 MMHG
BH CV ECHO MEAS - SI(MOD-SP4): 38.1 ML/M2
BH CV ECHO MEAS - SV(LVOT): 59.3 ML
BH CV ECHO MEAS - SV(MOD-SP4): 76 ML
BH CV ECHO MEAS - TR MAX PG: 26.6 MMHG
BH CV ECHO MEAS - TR MAX VEL: 258 CM/SEC
BH CV ECHO MEASUREMENTS AVERAGE E/E' RATIO: 17.32
LEFT ATRIUM VOLUME INDEX: 35.8 ML/M2
LEFT ATRIUM VOLUME: 71.5 ML
MAXIMAL PREDICTED HEART RATE: 151 BPM
STRESS TARGET HR: 128 BPM

## 2023-01-05 ENCOUNTER — OFFICE VISIT (OUTPATIENT)
Dept: CARDIOLOGY | Facility: CLINIC | Age: 68
End: 2023-01-05
Payer: MEDICARE

## 2023-01-05 VITALS
DIASTOLIC BLOOD PRESSURE: 72 MMHG | OXYGEN SATURATION: 95 % | HEART RATE: 65 BPM | WEIGHT: 242 LBS | BODY MASS INDEX: 47.51 KG/M2 | SYSTOLIC BLOOD PRESSURE: 118 MMHG | HEIGHT: 60 IN

## 2023-01-05 DIAGNOSIS — I48.0 PAF (PAROXYSMAL ATRIAL FIBRILLATION): ICD-10-CM

## 2023-01-05 DIAGNOSIS — E66.01 CLASS 3 SEVERE OBESITY DUE TO EXCESS CALORIES WITH SERIOUS COMORBIDITY AND BODY MASS INDEX (BMI) OF 45.0 TO 49.9 IN ADULT: ICD-10-CM

## 2023-01-05 DIAGNOSIS — I50.32 CHRONIC DIASTOLIC CONGESTIVE HEART FAILURE: Primary | ICD-10-CM

## 2023-01-05 DIAGNOSIS — E11.65 UNCONTROLLED TYPE 2 DIABETES MELLITUS WITH HYPERGLYCEMIA: ICD-10-CM

## 2023-01-05 DIAGNOSIS — R07.89 CHEST PAIN, ATYPICAL: ICD-10-CM

## 2023-01-05 DIAGNOSIS — I25.9 ISCHEMIC HEART DISEASE: ICD-10-CM

## 2023-01-05 DIAGNOSIS — Z79.01 CURRENT USE OF LONG TERM ANTICOAGULATION: ICD-10-CM

## 2023-01-05 DIAGNOSIS — I10 PRIMARY HYPERTENSION: ICD-10-CM

## 2023-01-05 DIAGNOSIS — E78.2 MIXED HYPERLIPIDEMIA: ICD-10-CM

## 2023-01-05 PROBLEM — E66.813 CLASS 3 SEVERE OBESITY DUE TO EXCESS CALORIES WITH SERIOUS COMORBIDITY AND BODY MASS INDEX (BMI) OF 45.0 TO 49.9 IN ADULT: Status: ACTIVE | Noted: 2022-11-15

## 2023-01-05 PROCEDURE — 99214 OFFICE O/P EST MOD 30 MIN: CPT | Performed by: NURSE PRACTITIONER

## 2023-01-05 RX ORDER — SACUBITRIL AND VALSARTAN 24; 26 MG/1; MG/1
1 TABLET, FILM COATED ORAL 2 TIMES DAILY
Qty: 60 TABLET | Refills: 11 | Status: SHIPPED | OUTPATIENT
Start: 2023-01-05 | End: 2023-04-03

## 2023-01-05 NOTE — PROGRESS NOTES
Chief Complaint  Chest Pain (6wk F/U. ), Palpitations, and Results (Holter/Rolanda/Echo)    Subjective          Aida Benitez presents to [facility]OLOG[facility] [id number] for routine follow-up of outpatient testing.  7-day Holter monitor revealed predominantly sinus rhythm with rare supraventricular ectopic beats with an APC burden of less than 1%, rare premature ventricular contractions with a PVC burden of less than 1%, 3 runs of supraventricular tachycardia with longest duration of 9 beats, no significant ectopy, no significant pauses and no correlated arrhythmia.  2D echo on 12/17/2022 revealed normal left ventricular systolic function with ejection fraction 56 to 60%, grade 1 left ventricular diastolic dysfunction, mildly increased left atrial volume, RV apical hypokinesis and no significant valvular heart disease.  Rolanda scan on 12/16/2022 revealed large size infarct in the inferior wall with no significant ischemia.  She has ischemic heart disease, chronic diastolic congestive heart failure, paroxysmal atrial fibrillation on chronic anticoagulation, type 2 diabetes mellitus, hypertension, hyperlipidemia, type 2 diabetes mellitus and morbid obesity. She complains of chronic dyspnea with mild exertion. She reports orthopnea, requiring two pillows to sleep. She complains of diaphoresis with exertion. She reports intermittent, non-exertional, substernal chest pain that radiates to the upper back. The pain occurs a few times a week and lasts for less than a minute at a time before resolving on its own. She states that she has had to take nitroglycerin on occasion. Patient denies palpitations, dizziness, syncope, orthopnea, PND, edema or decreased stamina.  Patient denies any signs of bleeding.    Congestive Heart Failure  Presents for follow-up visit. Associated symptoms include chest pain, fatigue, orthopnea and shortness of breath. Pertinent negatives include no abdominal pain, chest pressure,  claudication, edema, muscle weakness, near-syncope, nocturia, palpitations, paroxysmal nocturnal dyspnea or unexpected weight change. The symptoms have been stable. Compliance with total regimen is 51-75%. Compliance with diet is 51-75%. Compliance with exercise is 51-75%. Compliance with medications is %.   Atrial Fibrillation  Presents for follow-up visit. Symptoms include chest pain and shortness of breath. Symptoms are negative for an AICD problem, bradycardia, dizziness, hemodynamic instability, hypertension, hypotension, pacemaker problem, palpitations, syncope, tachycardia and weakness. The symptoms have been stable. Past medical history includes atrial fibrillation, CHF and hyperlipidemia.   Hypertension  This is a chronic problem. The current episode started more than 1 year ago. The problem is controlled. Associated symptoms include chest pain and shortness of breath. Pertinent negatives include no anxiety, blurred vision, headaches, malaise/fatigue, neck pain, orthopnea, palpitations, peripheral edema, PND or sweats. Risk factors for coronary artery disease include obesity, post-menopausal state, dyslipidemia and diabetes mellitus. Current antihypertension treatment includes beta blockers and diuretics. The current treatment provides significant improvement. Hypertensive end-organ damage includes heart failure. Identifiable causes of hypertension include sleep apnea.   Hyperlipidemia  This is a chronic problem. The current episode started more than 1 year ago. Exacerbating diseases include diabetes and obesity. Associated symptoms include chest pain and shortness of breath. Current antihyperlipidemic treatment includes statins. Risk factors for coronary artery disease include hypertension, obesity, post-menopausal, dyslipidemia and diabetes mellitus.       Objective     Current Outpatient Medications:   •  ALBUTEROL IN, Inhale., Disp: , Rfl:   •  amiodarone (PACERONE) 100 MG tablet, Take 100 mg by  mouth Daily., Disp: , Rfl:   •  apixaban (ELIQUIS) 5 MG tablet tablet, Take 5 mg by mouth 2 (Two) Times a Day., Disp: , Rfl:   •  atorvastatin (LIPITOR) 40 MG tablet, Take 80 mg by mouth Every Night., Disp: , Rfl:   •  B Complex Vitamins (VITAMIN B-COMPLEX PO), Take  by mouth Daily., Disp: , Rfl:   •  budesonide-formoterol (SYMBICORT) 160-4.5 MCG/ACT inhaler, Inhale 2 puffs 2 (Two) Times a Day., Disp: , Rfl:   •  CALCIUM PO, Take  by mouth Daily., Disp: , Rfl:   •  carvedilol (COREG) 25 MG tablet, Take 25 mg by mouth 2 (Two) Times a Day With Meals., Disp: , Rfl:   •  Cholecalciferol (VITAMIN D3 PO), Take  by mouth., Disp: , Rfl:   •  DULoxetine (CYMBALTA) 30 MG capsule, Take 30 mg by mouth Daily., Disp: , Rfl:   •  ezetimibe (ZETIA) 10 MG tablet, Take 10 mg by mouth Daily., Disp: , Rfl:   •  ferrous sulfate 325 (65 FE) MG tablet, Take 325 mg by mouth Daily With Breakfast., Disp: , Rfl:   •  furosemide (LASIX) 20 MG tablet, Take 20 mg by mouth Daily., Disp: , Rfl:   •  gabapentin (NEURONTIN) 300 MG capsule, Take 300 mg by mouth 3 (Three) Times a Day., Disp: , Rfl:   •  HYDROcodone-acetaminophen (NORCO)  MG per tablet, Take 1 tablet by mouth Every 6 (Six) Hours As Needed for Moderate Pain., Disp: , Rfl:   •  insulin detemir (LEVEMIR) 100 UNIT/ML injection, Inject  under the skin into the appropriate area as directed 2 (Two) Times a Day., Disp: , Rfl:   •  isosorbide mononitrate (IMDUR) 30 MG 24 hr tablet, Take 30 mg by mouth Daily., Disp: , Rfl:   •  levothyroxine (SYNTHROID, LEVOTHROID) 88 MCG tablet, Take 88 mcg by mouth Daily., Disp: , Rfl:   •  nitroglycerin (NITROSTAT) 0.3 MG SL tablet, Place 0.3 mg under the tongue Every 5 (Five) Minutes As Needed for Chest Pain. Take no more than 3 doses in 15 minutes., Disp: , Rfl:   •  pantoprazole (PROTONIX) 40 MG EC tablet, Take 40 mg by mouth Daily., Disp: , Rfl:   •  rOPINIRole (REQUIP) 0.5 MG tablet, Take 0.5 mg by mouth Every Night. Take 1 hour before bedtime.,  Disp: , Rfl:   •  vitamin D (ERGOCALCIFEROL) 1.25 MG (23321 UT) capsule capsule, Take 50,000 Units by mouth 2 (Two) Times a Week., Disp: , Rfl:   •  sacubitril-valsartan (Entresto) 24-26 MG tablet, Take 1 tablet by mouth 2 (Two) Times a Day., Disp: 60 tablet, Rfl: 11  Vital Signs:   /72   Pulse 65   Ht 152.4 cm (60\")   Wt 110 kg (242 lb)   SpO2 95%   BMI 47.26 kg/m²     Vitals and nursing note reviewed.   Constitutional:       General: Not in acute distress.     Appearance: Normal and healthy appearance. Well-developed and not in distress. Morbidly obese. Not diaphoretic.   Eyes:      General: Lids are normal.         Right eye: No discharge.         Left eye: No discharge.      Conjunctiva/sclera: Conjunctivae normal.      Pupils: Pupils are equal, round, and reactive to light.   HENT:      Head: Normocephalic and atraumatic.      Jaw: There is normal jaw occlusion.      Right Ear: External ear normal.      Left Ear: External ear normal.      Nose: Nose normal.   Neck:      Thyroid: No thyromegaly.      Vascular: No carotid bruit, JVD or JVR. JVD normal.      Trachea: Trachea normal. No tracheal deviation.   Pulmonary:      Effort: Pulmonary effort is normal. No respiratory distress.      Breath sounds: Decreased air movement present. Examination of the right-lower field reveals decreased breath sounds. Examination of the left-lower field reveals decreased breath sounds. Decreased breath sounds present. No wheezing. No rhonchi. No rales.   Chest:      Chest wall: Not tender to palpatation.   Cardiovascular:      PMI at left midclavicular line. Normal rate. Regular rhythm. Normal S1. Normal S2.      Murmurs: There is no murmur.      No gallop. No click. No rub.   Pulses:     Intact distal pulses. No decreased pulses.   Edema:     Peripheral edema absent.   Abdominal:      General: Bowel sounds are normal. There is no distension.      Palpations: Abdomen is soft.      Tenderness: There is no abdominal  tenderness.   Musculoskeletal: Normal range of motion.         General: No tenderness or deformity.      Cervical back: Normal range of motion and neck supple. Skin:     General: Skin is warm and dry.      Coloration: Skin is not pale.      Findings: No erythema or rash.   Neurological:      General: No focal deficit present.      Mental Status: Alert, oriented to person, place, and time and oriented to person, place and time.   Psychiatric:         Attention and Perception: Attention and perception normal.         Mood and Affect: Mood and affect normal.         Speech: Speech normal.         Behavior: Behavior normal.         Thought Content: Thought content normal.         Cognition and Memory: Cognition and memory normal.         Judgment: Judgment normal.        Result Review :   The following data was reviewed by: CHAIM Young on 01/05/2023:  Common labs    Common Labs 1/25/22 1/25/22    1142 1142   Total Cholesterol 145    Triglycerides 196 (A)    HDL Cholesterol 40    LDL Cholesterol  66    Hemoglobin A1C  9.7 (A)   (A) Abnormal value       Comments are available for some flowsheets but are not being displayed.           Data reviewed: Cardiology studies holter monitor 12/11/22, 2d echo 12/17/22 and lexiscan 12/16/22          Assessment and Plan    Diagnoses and all orders for this visit:    1. Chronic diastolic congestive heart failure (HCC) (Primary)-NYHA class II.  Stage C.  Compensated.  Start Entresto 24/26 mg twice daily.  BMP in 1 week. Reviewed signs and symptoms of CHF and what to report with the patient. Patient instructed to restrict sodium and weigh daily. Report weight gain of greater than 2 lbs overnight or 5 lbs in 1 week. Pt verbalized understanding of instructions and plan of care.  Consider initiation of spironolactone and Jardiance in the future, if tolerated.  Continue daily Lasix for now.    2. PAF (paroxysmal atrial fibrillation) (HCC)- in sinus rhythm today.  Anticoagulated.   Stable.  Continue amiodarone.    3. Current use of long term anticoagulation-patient continues on Eliquis.  Denies bleeding.    4. Primary hypertension-blood pressures are well controlled.  Continue to monitor following above medication adjustment.  Continue carvedilol.  Monitor and record daily blood pressure. Report readings consistently higher than 130/80 or consistently lower than 100/60.     5. Mixed hyperlipidemia-management per PCP.  Continue atorvastatin.    6. Uncontrolled type 2 diabetes mellitus with hyperglycemia (HCC)- type 2 diabetes mellitus in the setting of congestive heart failure.  Patient would likely benefit from addition of Jardiance in the future, if tolerated.  Management per PCP.    7. Class 3 severe obesity due to excess calories with serious comorbidity and body mass index (BMI) of 45.0 to 49.9 in adult (HCC)- Class 3 Severe Obesity (BMI >=40). Obesity-related health conditions include the following: obstructive sleep apnea, hypertension, diabetes mellitus, dyslipidemias and GERD. Obesity is unchanged. BMI is is above average; no BMI management plan is appropriate. We discussed low calorie, low carb based diet program, portion control and increasing exercise.    8. Ischemic heart disease- previous infarction on lexiscan with no new ischemia.     9. Chest pain- check CT angiogram of the coronary arteries.       Follow Up   Return in about 4 weeks (around 2/2/2023) for Next scheduled follow up.  Patient was given instructions and counseling regarding her condition or for health maintenance advice. Please see specific information pulled into the AVS if appropriate.

## 2023-01-20 ENCOUNTER — HOSPITAL ENCOUNTER (OUTPATIENT)
Dept: CT IMAGING | Facility: HOSPITAL | Age: 68
Discharge: HOME | End: 2023-01-20
Admitting: NURSE PRACTITIONER
Payer: MEDICARE

## 2023-01-20 VITALS
SYSTOLIC BLOOD PRESSURE: 128 MMHG | BODY MASS INDEX: 45.55 KG/M2 | DIASTOLIC BLOOD PRESSURE: 72 MMHG | HEART RATE: 59 BPM | HEIGHT: 60 IN | WEIGHT: 232 LBS | RESPIRATION RATE: 16 BRPM | OXYGEN SATURATION: 100 %

## 2023-01-20 DIAGNOSIS — R07.89 CHEST PAIN, ATYPICAL: ICD-10-CM

## 2023-01-20 LAB
CREAT SERPL-MCNC: 0.64 MG/DL (ref 0.57–1)
EGFRCR SERPLBLD CKD-EPI 2021: 95.8 ML/MIN/1.73

## 2023-01-20 PROCEDURE — A9270 NON-COVERED ITEM OR SERVICE: HCPCS | Performed by: INTERNAL MEDICINE

## 2023-01-20 PROCEDURE — 82565 ASSAY OF CREATININE: CPT | Performed by: INTERNAL MEDICINE

## 2023-01-20 PROCEDURE — 75574 CT ANGIO HRT W/3D IMAGE: CPT | Performed by: INTERNAL MEDICINE

## 2023-01-20 PROCEDURE — 0 IOPAMIDOL PER 1 ML: Performed by: NURSE PRACTITIONER

## 2023-01-20 PROCEDURE — 63710000001 NITROGLYCERIN 0.4 MG SUBLINGUAL TABLET 25 EACH BOTTLE: Performed by: INTERNAL MEDICINE

## 2023-01-20 PROCEDURE — 75574 CT ANGIO HRT W/3D IMAGE: CPT

## 2023-01-20 RX ORDER — LIDOCAINE HYDROCHLORIDE 10 MG/ML
5 INJECTION, SOLUTION EPIDURAL; INFILTRATION; INTRACAUDAL; PERINEURAL AS NEEDED
Status: DISCONTINUED | OUTPATIENT
Start: 2023-01-20 | End: 2023-01-21 | Stop reason: HOSPADM

## 2023-01-20 RX ORDER — METOPROLOL TARTRATE 100 MG/1
100 TABLET ORAL ONCE AS NEEDED
Status: DISCONTINUED | OUTPATIENT
Start: 2023-01-20 | End: 2023-01-21 | Stop reason: HOSPADM

## 2023-01-20 RX ORDER — SODIUM CHLORIDE 0.9 % (FLUSH) 0.9 %
3 SYRINGE (ML) INJECTION EVERY 12 HOURS SCHEDULED
Status: DISCONTINUED | OUTPATIENT
Start: 2023-01-20 | End: 2023-01-21 | Stop reason: HOSPADM

## 2023-01-20 RX ORDER — SODIUM CHLORIDE 0.9 % (FLUSH) 0.9 %
10 SYRINGE (ML) INJECTION AS NEEDED
Status: DISCONTINUED | OUTPATIENT
Start: 2023-01-20 | End: 2023-01-21 | Stop reason: HOSPADM

## 2023-01-20 RX ORDER — METOPROLOL TARTRATE 50 MG/1
50 TABLET, FILM COATED ORAL ONCE AS NEEDED
Status: DISCONTINUED | OUTPATIENT
Start: 2023-01-20 | End: 2023-01-21 | Stop reason: HOSPADM

## 2023-01-20 RX ORDER — METOPROLOL TARTRATE 5 MG/5ML
5 INJECTION INTRAVENOUS
Status: DISCONTINUED | OUTPATIENT
Start: 2023-01-20 | End: 2023-01-21 | Stop reason: HOSPADM

## 2023-01-20 RX ORDER — NITROGLYCERIN 0.4 MG/1
TABLET SUBLINGUAL
Status: DISCONTINUED
Start: 2023-01-20 | End: 2023-01-21 | Stop reason: HOSPADM

## 2023-01-20 RX ORDER — NITROGLYCERIN 0.4 MG/1
TABLET SUBLINGUAL
Status: DISCONTINUED | OUTPATIENT
Start: 2023-01-20 | End: 2023-01-21 | Stop reason: HOSPADM

## 2023-01-20 RX ADMIN — NITROGLYCERIN 0.4 MG: 0.4 TABLET SUBLINGUAL at 13:08

## 2023-01-20 RX ADMIN — IOPAMIDOL 100 ML: 755 INJECTION, SOLUTION INTRAVENOUS at 13:19

## 2023-01-20 RX ADMIN — NITROGLYCERIN 0.4 MG: 0.4 TABLET SUBLINGUAL at 13:00

## 2023-03-09 ENCOUNTER — PREP FOR SURGERY (OUTPATIENT)
Dept: OTHER | Facility: HOSPITAL | Age: 68
End: 2023-03-09
Payer: MEDICARE

## 2023-03-09 ENCOUNTER — OFFICE VISIT (OUTPATIENT)
Dept: CARDIOLOGY | Facility: CLINIC | Age: 68
End: 2023-03-09
Payer: MEDICARE

## 2023-03-09 VITALS
SYSTOLIC BLOOD PRESSURE: 132 MMHG | HEIGHT: 60 IN | HEART RATE: 60 BPM | DIASTOLIC BLOOD PRESSURE: 74 MMHG | BODY MASS INDEX: 43.98 KG/M2 | WEIGHT: 224 LBS | OXYGEN SATURATION: 97 %

## 2023-03-09 DIAGNOSIS — Z79.01 CURRENT USE OF LONG TERM ANTICOAGULATION: ICD-10-CM

## 2023-03-09 DIAGNOSIS — R07.9 CHEST PAIN IN ADULT: ICD-10-CM

## 2023-03-09 DIAGNOSIS — E66.01 CLASS 3 SEVERE OBESITY DUE TO EXCESS CALORIES WITH SERIOUS COMORBIDITY AND BODY MASS INDEX (BMI) OF 45.0 TO 49.9 IN ADULT: ICD-10-CM

## 2023-03-09 DIAGNOSIS — I10 PRIMARY HYPERTENSION: ICD-10-CM

## 2023-03-09 DIAGNOSIS — I50.32 CHRONIC DIASTOLIC CONGESTIVE HEART FAILURE: Primary | ICD-10-CM

## 2023-03-09 DIAGNOSIS — I25.9 ISCHEMIC HEART DISEASE: ICD-10-CM

## 2023-03-09 DIAGNOSIS — E11.65 UNCONTROLLED TYPE 2 DIABETES MELLITUS WITH HYPERGLYCEMIA: ICD-10-CM

## 2023-03-09 DIAGNOSIS — E78.2 MIXED HYPERLIPIDEMIA: ICD-10-CM

## 2023-03-09 DIAGNOSIS — R93.1 ABNORMAL CARDIAC CT ANGIOGRAPHY: Primary | ICD-10-CM

## 2023-03-09 DIAGNOSIS — I48.0 PAF (PAROXYSMAL ATRIAL FIBRILLATION): ICD-10-CM

## 2023-03-09 PROCEDURE — 3075F SYST BP GE 130 - 139MM HG: CPT | Performed by: NURSE PRACTITIONER

## 2023-03-09 PROCEDURE — 3078F DIAST BP <80 MM HG: CPT | Performed by: NURSE PRACTITIONER

## 2023-03-09 PROCEDURE — 1160F RVW MEDS BY RX/DR IN RCRD: CPT | Performed by: NURSE PRACTITIONER

## 2023-03-09 PROCEDURE — 99214 OFFICE O/P EST MOD 30 MIN: CPT | Performed by: NURSE PRACTITIONER

## 2023-03-09 PROCEDURE — 1159F MED LIST DOCD IN RCRD: CPT | Performed by: NURSE PRACTITIONER

## 2023-03-09 RX ORDER — SODIUM CHLORIDE 0.9 % (FLUSH) 0.9 %
10 SYRINGE (ML) INJECTION AS NEEDED
Status: CANCELLED | OUTPATIENT
Start: 2023-03-09

## 2023-03-09 RX ORDER — ASPIRIN 81 MG/1
81 TABLET ORAL DAILY
Qty: 30 TABLET | Refills: 11 | Status: SHIPPED | OUTPATIENT
Start: 2023-03-09

## 2023-03-09 RX ORDER — SODIUM CHLORIDE 0.9 % (FLUSH) 0.9 %
10 SYRINGE (ML) INJECTION EVERY 12 HOURS SCHEDULED
Status: CANCELLED | OUTPATIENT
Start: 2023-03-09

## 2023-03-09 RX ORDER — SPIRONOLACTONE 25 MG/1
25 TABLET ORAL DAILY
Qty: 30 TABLET | Refills: 11 | Status: SHIPPED | OUTPATIENT
Start: 2023-03-09

## 2023-03-09 RX ORDER — SODIUM CHLORIDE 9 MG/ML
40 INJECTION, SOLUTION INTRAVENOUS AS NEEDED
Status: CANCELLED | OUTPATIENT
Start: 2023-03-09

## 2023-03-09 RX ORDER — ONDANSETRON 2 MG/ML
4 INJECTION INTRAMUSCULAR; INTRAVENOUS EVERY 6 HOURS PRN
Status: CANCELLED | OUTPATIENT
Start: 2023-03-09

## 2023-03-09 RX ORDER — SODIUM CHLORIDE 9 MG/ML
1-3 INJECTION, SOLUTION INTRAVENOUS CONTINUOUS
Status: CANCELLED | OUTPATIENT
Start: 2023-03-09

## 2023-03-09 RX ORDER — NITROGLYCERIN 0.4 MG/1
0.4 TABLET SUBLINGUAL
Status: CANCELLED | OUTPATIENT
Start: 2023-03-09

## 2023-03-09 NOTE — PROGRESS NOTES
Chief Complaint  Congestive Heart Failure (8wk F/U. Started Entresto LOV) and Results (CTA)    Subjective          Aida Benitez presents to [facility] CARDIOLOGY [id number] for routine follow-up of outpatient testing and medication adjustment. She was started on Entresto 24/26 mg twice daily at her last office visit on 1/5/23. Follow up BMP results are not available for review at this time, however creatinine on 1/20/23 was normal. CT angiogram of the coronary arteries on 1/5/23 revealed heavy coronary artery calcification with more than 50% stenosis of the LAD, LCX and RCA. She has ischemic heart disease, chronic diastolic congestive heart failure, paroxysmal atrial fibrillation on chronic anticoagulation, type 2 diabetes mellitus, hypertension, hyperlipidemia, type 2 diabetes mellitus and morbid obesity. She continues to complain of chronic dyspnea with mild exertion. She reports orthopnea, requiring two pillows to sleep. She complains of diaphoresis with exertion. She reports intermittent, non-exertional, substernal chest pain that radiates to the upper back. The pain occurs a few times a week and lasts for less than a minute at a time before resolving on its own. She states that she has had to take nitroglycerin on occasion. Patient denies palpitations, dizziness, syncope, orthopnea, PND, edema or decreased stamina.  Patient denies any signs of bleeding.    Congestive Heart Failure  Presents for follow-up visit. Associated symptoms include chest pain, fatigue, orthopnea and shortness of breath. Pertinent negatives include no abdominal pain, chest pressure, claudication, edema, muscle weakness, near-syncope, nocturia, palpitations, paroxysmal nocturnal dyspnea or unexpected weight change. The symptoms have been stable. Compliance with total regimen is 51-75%. Compliance with diet is 51-75%. Compliance with exercise is 51-75%. Compliance with medications is %.   Atrial Fibrillation  Presents  for follow-up visit. Symptoms include chest pain and shortness of breath. Symptoms are negative for an AICD problem, bradycardia, dizziness, hemodynamic instability, hypertension, hypotension, pacemaker problem, palpitations, syncope, tachycardia and weakness. The symptoms have been stable. Past medical history includes atrial fibrillation, CHF and hyperlipidemia.   Hypertension  This is a chronic problem. The current episode started more than 1 year ago. The problem is controlled. Associated symptoms include chest pain and shortness of breath. Pertinent negatives include no anxiety, blurred vision, headaches, malaise/fatigue, neck pain, orthopnea, palpitations, peripheral edema, PND or sweats. Risk factors for coronary artery disease include obesity, post-menopausal state, dyslipidemia and diabetes mellitus. Current antihypertension treatment includes beta blockers and diuretics. The current treatment provides significant improvement. Hypertensive end-organ damage includes heart failure. Identifiable causes of hypertension include sleep apnea.   Hyperlipidemia  This is a chronic problem. The current episode started more than 1 year ago. Exacerbating diseases include diabetes and obesity. Associated symptoms include chest pain and shortness of breath. Current antihyperlipidemic treatment includes statins. Risk factors for coronary artery disease include hypertension, obesity, post-menopausal, dyslipidemia and diabetes mellitus.     I have reviewed and confirmed the accuracy of the ROS  CHAIM Young    Objective     Current Outpatient Medications:   •  ALBUTEROL IN, Inhale., Disp: , Rfl:   •  amiodarone (PACERONE) 100 MG tablet, Take 1 tablet by mouth Daily., Disp: , Rfl:   •  apixaban (ELIQUIS) 5 MG tablet tablet, Take 1 tablet by mouth 2 (Two) Times a Day., Disp: 60 tablet, Rfl: 11  •  atorvastatin (LIPITOR) 40 MG tablet, Take 2 tablets by mouth Every Night., Disp: , Rfl:   •  B Complex Vitamins (VITAMIN  B-COMPLEX PO), Take  by mouth Daily., Disp: , Rfl:   •  budesonide-formoterol (SYMBICORT) 160-4.5 MCG/ACT inhaler, Inhale 2 puffs 2 (Two) Times a Day., Disp: , Rfl:   •  CALCIUM PO, Take  by mouth Daily., Disp: , Rfl:   •  carvedilol (COREG) 25 MG tablet, Take 1 tablet by mouth 2 (Two) Times a Day With Meals., Disp: , Rfl:   •  Cholecalciferol (VITAMIN D3 PO), Take  by mouth., Disp: , Rfl:   •  DULoxetine (CYMBALTA) 30 MG capsule, Take 1 capsule by mouth Daily., Disp: , Rfl:   •  ezetimibe (ZETIA) 10 MG tablet, Take 1 tablet by mouth Daily., Disp: , Rfl:   •  ferrous sulfate 325 (65 FE) MG tablet, Take 1 tablet by mouth Daily With Breakfast., Disp: , Rfl:   •  furosemide (LASIX) 20 MG tablet, Take 1 tablet by mouth Daily., Disp: , Rfl:   •  gabapentin (NEURONTIN) 300 MG capsule, Take 1 capsule by mouth 3 (Three) Times a Day., Disp: , Rfl:   •  HYDROcodone-acetaminophen (NORCO)  MG per tablet, Take 1 tablet by mouth Every 6 (Six) Hours As Needed for Moderate Pain., Disp: , Rfl:   •  isosorbide mononitrate (IMDUR) 30 MG 24 hr tablet, Take 1 tablet by mouth Daily., Disp: , Rfl:   •  levothyroxine (SYNTHROID, LEVOTHROID) 88 MCG tablet, Take 1 tablet by mouth Daily., Disp: , Rfl:   •  nitroglycerin (NITROSTAT) 0.3 MG SL tablet, Place 1 tablet under the tongue Every 5 (Five) Minutes As Needed for Chest Pain. Take no more than 3 doses in 15 minutes., Disp: , Rfl:   •  pantoprazole (PROTONIX) 40 MG EC tablet, Take 1 tablet by mouth Daily., Disp: , Rfl:   •  rOPINIRole (REQUIP) 0.5 MG tablet, Take 1 tablet by mouth Every Night. Take 1 hour before bedtime., Disp: , Rfl:   •  sacubitril-valsartan (Entresto) 24-26 MG tablet, Take 1 tablet by mouth 2 (Two) Times a Day., Disp: 60 tablet, Rfl: 11  •  vitamin D (ERGOCALCIFEROL) 1.25 MG (62890 UT) capsule capsule, Take 1 capsule by mouth 2 (Two) Times a Week., Disp: , Rfl:   •  aspirin 81 MG EC tablet, Take 1 tablet by mouth Daily., Disp: 30 tablet, Rfl: 11  •  spironolactone  "(ALDACTONE) 25 MG tablet, Take 1 tablet by mouth Daily., Disp: 30 tablet, Rfl: 11  Vital Signs:   /74   Pulse 60   Ht 152.4 cm (60\")   Wt 102 kg (224 lb)   SpO2 97%   BMI 43.75 kg/m²     Vitals and nursing note reviewed.   Constitutional:       General: Not in acute distress.     Appearance: Normal and healthy appearance. Well-developed and not in distress. Morbidly obese. Not diaphoretic.   Eyes:      General: Lids are normal.         Right eye: No discharge.         Left eye: No discharge.      Conjunctiva/sclera: Conjunctivae normal.      Pupils: Pupils are equal, round, and reactive to light.   HENT:      Head: Normocephalic and atraumatic.      Jaw: There is normal jaw occlusion.      Right Ear: External ear normal.      Left Ear: External ear normal.      Nose: Nose normal.   Neck:      Thyroid: No thyromegaly.      Vascular: No carotid bruit, JVD or JVR. JVD normal.      Trachea: Trachea normal. No tracheal deviation.   Pulmonary:      Effort: Pulmonary effort is normal. No respiratory distress.      Breath sounds: Decreased air movement present. Examination of the right-lower field reveals decreased breath sounds. Examination of the left-lower field reveals decreased breath sounds. Decreased breath sounds present. No wheezing. No rhonchi. No rales.   Chest:      Chest wall: Not tender to palpatation.   Cardiovascular:      PMI at left midclavicular line. Normal rate. Regular rhythm. Normal S1. Normal S2.      Murmurs: There is no murmur.      No gallop. No click. No rub.   Pulses:     Intact distal pulses. No decreased pulses.   Edema:     Peripheral edema absent.   Abdominal:      General: Bowel sounds are normal. There is no distension.      Palpations: Abdomen is soft.      Tenderness: There is no abdominal tenderness.   Musculoskeletal: Normal range of motion.         General: No tenderness or deformity.      Cervical back: Normal range of motion and neck supple. Skin:     General: Skin is " warm and dry.      Coloration: Skin is not pale.      Findings: No erythema or rash.   Neurological:      General: No focal deficit present.      Mental Status: Alert, oriented to person, place, and time and oriented to person, place and time.   Psychiatric:         Attention and Perception: Attention and perception normal.         Mood and Affect: Mood and affect normal.         Speech: Speech normal.         Behavior: Behavior normal.         Thought Content: Thought content normal.         Cognition and Memory: Cognition and memory normal.         Judgment: Judgment normal.        Result Review :   The following data was reviewed by: CHAIM Young on 3/9/2023:  Common labs    Common Labs 1/20/23   Creatinine 0.64           Data reviewed: Cardiology studies holter monitor 12/11/22, 2d echo 12/17/22, lexiscan 12/16/22 and CTA of the coronary arteries 1/20/23         Assessment and Plan    Diagnoses and all orders for this visit:    1. Chronic diastolic congestive heart failure (HCC) (Primary)-NYHA class II.  Stage C.  Compensated.  Start spironolactone 25 mg daily.  BMP in 1 week. Reviewed signs and symptoms of CHF and what to report with the patient. Patient instructed to restrict sodium and weigh daily. Report weight gain of greater than 2 lbs overnight or 5 lbs in 1 week. Pt verbalized understanding of instructions and plan of care.  Pt has failed Jardiance in the past due to candidiasis. Could consider re-initiation of Jardiance in the future, if HgB AIC improves.  Continue Entresto. Continue daily Lasix for now.    2. PAF (paroxysmal atrial fibrillation) ([facility])- in sinus rhythm today.  Anticoagulated.  Stable.  Continue amiodarone.    3. Current use of long term anticoagulation-patient continues on Eliquis.  Denies bleeding.    4. Primary hypertension-blood pressure is mildly elevated in office today. Continue to monitor following above medication adjustment.  Continue Entresto and carvedilol.   Monitor and record daily blood pressure. Report readings consistently higher than 130/80 or consistently lower than 100/60.     5. Mixed hyperlipidemia-management per PCP.  Continue atorvastatin.    6. Uncontrolled type 2 diabetes mellitus with hyperglycemia (HCC)- type 2 diabetes mellitus in the setting of congestive heart failure.  Patient would likely benefit from addition of Jardiance in the future, if tolerated.  Management per PCP.    7. Class 3 severe obesity due to excess calories with serious comorbidity and body mass index (BMI) of 45.0 to 49.9 in adult (HCC)- Class 3 Severe Obesity (BMI >=40). Obesity-related health conditions include the following: obstructive sleep apnea, hypertension, diabetes mellitus, dyslipidemias and GERD. Obesity is unchanged. BMI is is above average; no BMI management plan is appropriate. We discussed low calorie, low carb based diet program, portion control and increasing exercise.    8. Ischemic heart disease- previous infarction on lexiscan with no new ischemia.     9. Chest pain- abnormal CTA of the coronary arteries on 1/20/23. Pt to be scheduled for left heart catheterization. Start aspirin 81 mg daily.       Follow Up   Return in about 4 weeks (around 4/6/2023) for Next scheduled follow up.  Patient was given instructions and counseling regarding her condition or for health maintenance advice. Please see specific information pulled into the AVS if appropriate.

## 2023-03-09 NOTE — H&P (VIEW-ONLY)
Chief Complaint  Congestive Heart Failure (8wk F/U. Started Entresto LOV) and Results (CTA)    Subjective          Aida Benitez presents to [id number] for routine follow-up of outpatient testing and medication adjustment. She was started on Entresto 24/26 mg twice daily at her last office visit on 1/5/23. Follow up BMP results are not available for review at this time, however creatinine on 1/20/23 was normal. CT angiogram of the coronary arteries on 1/5/23 revealed heavy coronary artery calcification with more than 50% stenosis of the LAD, LCX and RCA. She has ischemic heart disease, chronic diastolic congestive heart failure, paroxysmal atrial fibrillation on chronic anticoagulation, type 2 diabetes mellitus, hypertension, hyperlipidemia, type 2 diabetes mellitus and morbid obesity. She continues to complain of chronic dyspnea with mild exertion. She reports orthopnea, requiring two pillows to sleep. She complains of diaphoresis with exertion. She reports intermittent, non-exertional, substernal chest pain that radiates to the upper back. The pain occurs a few times a week and lasts for less than a minute at a time before resolving on its own. She states that she has had to take nitroglycerin on occasion. Patient denies palpitations, dizziness, syncope, orthopnea, PND, edema or decreased stamina.  Patient denies any signs of bleeding.    Congestive Heart Failure  Presents for follow-up visit. Associated symptoms include chest pain, fatigue, orthopnea and shortness of breath. Pertinent negatives include no abdominal pain, chest pressure, claudication, edema, muscle weakness, near-syncope, nocturia, palpitations, paroxysmal nocturnal dyspnea or unexpected weight change. The symptoms have been stable. Compliance with total regimen is 51-75%. Compliance with diet is 51-75%. Compliance with exercise is 51-75%. Compliance with medications is %.   Atrial Fibrillation  Presents  for follow-up visit. Symptoms include chest pain and shortness of breath. Symptoms are negative for an AICD problem, bradycardia, dizziness, hemodynamic instability, hypertension, hypotension, pacemaker problem, palpitations, syncope, tachycardia and weakness. The symptoms have been stable. Past medical history includes atrial fibrillation, CHF and hyperlipidemia.   Hypertension  This is a chronic problem. The current episode started more than 1 year ago. The problem is controlled. Associated symptoms include chest pain and shortness of breath. Pertinent negatives include no anxiety, blurred vision, headaches, malaise/fatigue, neck pain, orthopnea, palpitations, peripheral edema, PND or sweats. Risk factors for coronary artery disease include obesity, post-menopausal state, dyslipidemia and diabetes mellitus. Current antihypertension treatment includes beta blockers and diuretics. The current treatment provides significant improvement. Hypertensive end-organ damage includes heart failure. Identifiable causes of hypertension include sleep apnea.   Hyperlipidemia  This is a chronic problem. The current episode started more than 1 year ago. Exacerbating diseases include diabetes and obesity. Associated symptoms include chest pain and shortness of breath. Current antihyperlipidemic treatment includes statins. Risk factors for coronary artery disease include hypertension, obesity, post-menopausal, dyslipidemia and diabetes mellitus.     I have reviewed and confirmed the accuracy of the ROS  CHAIM Young    Objective     Current Outpatient Medications:   •  ALBUTEROL IN, Inhale., Disp: , Rfl:   •  amiodarone (PACERONE) 100 MG tablet, Take 1 tablet by mouth Daily., Disp: , Rfl:   •  apixaban (ELIQUIS) 5 MG tablet tablet, Take 1 tablet by mouth 2 (Two) Times a Day., Disp: 60 tablet, Rfl: 11  •  atorvastatin (LIPITOR) 40 MG tablet, Take 2 tablets by mouth Every Night., Disp: , Rfl:   •  B Complex Vitamins (VITAMIN  B-COMPLEX PO), Take  by mouth Daily., Disp: , Rfl:   •  budesonide-formoterol (SYMBICORT) 160-4.5 MCG/ACT inhaler, Inhale 2 puffs 2 (Two) Times a Day., Disp: , Rfl:   •  CALCIUM PO, Take  by mouth Daily., Disp: , Rfl:   •  carvedilol (COREG) 25 MG tablet, Take 1 tablet by mouth 2 (Two) Times a Day With Meals., Disp: , Rfl:   •  Cholecalciferol (VITAMIN D3 PO), Take  by mouth., Disp: , Rfl:   •  DULoxetine (CYMBALTA) 30 MG capsule, Take 1 capsule by mouth Daily., Disp: , Rfl:   •  ezetimibe (ZETIA) 10 MG tablet, Take 1 tablet by mouth Daily., Disp: , Rfl:   •  ferrous sulfate 325 (65 FE) MG tablet, Take 1 tablet by mouth Daily With Breakfast., Disp: , Rfl:   •  furosemide (LASIX) 20 MG tablet, Take 1 tablet by mouth Daily., Disp: , Rfl:   •  gabapentin (NEURONTIN) 300 MG capsule, Take 1 capsule by mouth 3 (Three) Times a Day., Disp: , Rfl:   •  HYDROcodone-acetaminophen (NORCO)  MG per tablet, Take 1 tablet by mouth Every 6 (Six) Hours As Needed for Moderate Pain., Disp: , Rfl:   •  isosorbide mononitrate (IMDUR) 30 MG 24 hr tablet, Take 1 tablet by mouth Daily., Disp: , Rfl:   •  levothyroxine (SYNTHROID, LEVOTHROID) 88 MCG tablet, Take 1 tablet by mouth Daily., Disp: , Rfl:   •  nitroglycerin (NITROSTAT) 0.3 MG SL tablet, Place 1 tablet under the tongue Every 5 (Five) Minutes As Needed for Chest Pain. Take no more than 3 doses in 15 minutes., Disp: , Rfl:   •  pantoprazole (PROTONIX) 40 MG EC tablet, Take 1 tablet by mouth Daily., Disp: , Rfl:   •  rOPINIRole (REQUIP) 0.5 MG tablet, Take 1 tablet by mouth Every Night. Take 1 hour before bedtime., Disp: , Rfl:   •  sacubitril-valsartan (Entresto) 24-26 MG tablet, Take 1 tablet by mouth 2 (Two) Times a Day., Disp: 60 tablet, Rfl: 11  •  vitamin D (ERGOCALCIFEROL) 1.25 MG (26475 UT) capsule capsule, Take 1 capsule by mouth 2 (Two) Times a Week., Disp: , Rfl:   •  aspirin 81 MG EC tablet, Take 1 tablet by mouth Daily., Disp: 30 tablet, Rfl: 11  •  spironolactone  "(ALDACTONE) 25 MG tablet, Take 1 tablet by mouth Daily., Disp: 30 tablet, Rfl: 11  Vital Signs:   /74   Pulse 60   Ht 152.4 cm (60\")   Wt 102 kg (224 lb)   SpO2 97%   BMI 43.75 kg/m²     Vitals and nursing note reviewed.   Constitutional:       General: Not in acute distress.     Appearance: Normal and healthy appearance. Well-developed and not in distress. Morbidly obese. Not diaphoretic.   Eyes:      General: Lids are normal.         Right eye: No discharge.         Left eye: No discharge.      Conjunctiva/sclera: Conjunctivae normal.      Pupils: Pupils are equal, round, and reactive to light.   HENT:      Head: Normocephalic and atraumatic.      Jaw: There is normal jaw occlusion.      Right Ear: External ear normal.      Left Ear: External ear normal.      Nose: Nose normal.   Neck:      Thyroid: No thyromegaly.      Vascular: No carotid bruit, JVD or JVR. JVD normal.      Trachea: Trachea normal. No tracheal deviation.   Pulmonary:      Effort: Pulmonary effort is normal. No respiratory distress.      Breath sounds: Decreased air movement present. Examination of the right-lower field reveals decreased breath sounds. Examination of the left-lower field reveals decreased breath sounds. Decreased breath sounds present. No wheezing. No rhonchi. No rales.   Chest:      Chest wall: Not tender to palpatation.   Cardiovascular:      PMI at left midclavicular line. Normal rate. Regular rhythm. Normal S1. Normal S2.      Murmurs: There is no murmur.      No gallop. No click. No rub.   Pulses:     Intact distal pulses. No decreased pulses.   Edema:     Peripheral edema absent.   Abdominal:      General: Bowel sounds are normal. There is no distension.      Palpations: Abdomen is soft.      Tenderness: There is no abdominal tenderness.   Musculoskeletal: Normal range of motion.         General: No tenderness or deformity.      Cervical back: Normal range of motion and neck supple. Skin:     General: Skin is " warm and dry.      Coloration: Skin is not pale.      Findings: No erythema or rash.   Neurological:      General: No focal deficit present.      Mental Status: Alert, oriented to person, place, and time and oriented to person, place and time.   Psychiatric:         Attention and Perception: Attention and perception normal.         Mood and Affect: Mood and affect normal.         Speech: Speech normal.         Behavior: Behavior normal.         Thought Content: Thought content normal.         Cognition and Memory: Cognition and memory normal.         Judgment: Judgment normal.        Result Review :   The following data was reviewed by: CHAIM Young on 3/9/2023:  Common labs    Common Labs 1/20/23   Creatinine 0.64           Data reviewed: Cardiology studies holter monitor 12/11/22, 2d echo 12/17/22, lexiscan 12/16/22 and CTA of the coronary arteries 1/20/23         Assessment and Plan    Diagnoses and all orders for this visit:    1. Chronic diastolic congestive heart failure (HCC) (Primary)-NYHA class II.  Stage C.  Compensated.  Start spironolactone 25 mg daily.  BMP in 1 week. Reviewed signs and symptoms of CHF and what to report with the patient. Patient instructed to restrict sodium and weigh daily. Report weight gain of greater than 2 lbs overnight or 5 lbs in 1 week. Pt verbalized understanding of instructions and plan of care.  Pt has failed Jardiance in the past due to candidiasis. Could consider re-initiation of Jardiance in the future, if HgB AIC improves.  Continue Entresto. Continue daily Lasix for now.    2. PAF (paroxysmal atrial fibrillation) ([facility])- in sinus rhythm today.  Anticoagulated.  Stable.  Continue amiodarone.    3. Current use of long term anticoagulation-patient continues on Eliquis.  Denies bleeding.    4. Primary hypertension-blood pressure is mildly elevated in office today. Continue to monitor following above medication adjustment.  Continue Entresto and carvedilol.   Monitor and record daily blood pressure. Report readings consistently higher than 130/80 or consistently lower than 100/60.     5. Mixed hyperlipidemia-management per PCP.  Continue atorvastatin.    6. Uncontrolled type 2 diabetes mellitus with hyperglycemia (HCC)- type 2 diabetes mellitus in the setting of congestive heart failure.  Patient would likely benefit from addition of Jardiance in the future, if tolerated.  Management per PCP.    7. Class 3 severe obesity due to excess calories with serious comorbidity and body mass index (BMI) of 45.0 to 49.9 in adult (HCC)- Class 3 Severe Obesity (BMI >=40). Obesity-related health conditions include the following: obstructive sleep apnea, hypertension, diabetes mellitus, dyslipidemias and GERD. Obesity is unchanged. BMI is is above average; no BMI management plan is appropriate. We discussed low calorie, low carb based diet program, portion control and increasing exercise.    8. Ischemic heart disease- previous infarction on lexiscan with no new ischemia.     9. Chest pain- abnormal CTA of the coronary arteries on 1/20/23. Pt to be scheduled for left heart catheterization. Start aspirin 81 mg daily.       Follow Up   Return in about 4 weeks (around 4/6/2023) for Next scheduled follow up.  Patient was given instructions and counseling regarding her condition or for health maintenance advice. Please see specific information pulled into the AVS if appropriate.

## 2023-03-10 PROBLEM — R93.1 ABNORMAL CARDIAC CT ANGIOGRAPHY: Status: ACTIVE | Noted: 2023-03-10

## 2023-03-21 ENCOUNTER — HOSPITAL ENCOUNTER (OUTPATIENT)
Facility: HOSPITAL | Age: 68
Setting detail: HOSPITAL OUTPATIENT SURGERY
Discharge: HOME | End: 2023-03-21
Attending: INTERNAL MEDICINE | Admitting: INTERNAL MEDICINE
Payer: MEDICARE

## 2023-03-21 VITALS
HEART RATE: 53 BPM | BODY MASS INDEX: 45.55 KG/M2 | TEMPERATURE: 97.4 F | DIASTOLIC BLOOD PRESSURE: 42 MMHG | HEIGHT: 60 IN | WEIGHT: 232 LBS | SYSTOLIC BLOOD PRESSURE: 99 MMHG | RESPIRATION RATE: 19 BRPM | OXYGEN SATURATION: 100 %

## 2023-03-21 DIAGNOSIS — I25.10 CAD, MULTIPLE VESSEL: Primary | ICD-10-CM

## 2023-03-21 DIAGNOSIS — R93.1 ABNORMAL CARDIAC CT ANGIOGRAPHY: ICD-10-CM

## 2023-03-21 LAB
ALBUMIN SERPL-MCNC: 4 G/DL (ref 3.5–5.2)
ALBUMIN/GLOB SERPL: 1.3 G/DL
ALP SERPL-CCNC: 103 U/L (ref 39–117)
ALT SERPL W P-5'-P-CCNC: 12 U/L (ref 1–33)
ANION GAP SERPL CALCULATED.3IONS-SCNC: 13 MMOL/L (ref 5–15)
AST SERPL-CCNC: 22 U/L (ref 1–32)
BILIRUB SERPL-MCNC: 0.5 MG/DL (ref 0–1.2)
BUN SERPL-MCNC: 12 MG/DL (ref 8–23)
BUN/CREAT SERPL: 14 (ref 7–25)
CALCIUM SPEC-SCNC: 8.8 MG/DL (ref 8.6–10.5)
CHLORIDE SERPL-SCNC: 99 MMOL/L (ref 98–107)
CO2 SERPL-SCNC: 25 MMOL/L (ref 22–29)
CREAT SERPL-MCNC: 0.86 MG/DL (ref 0.57–1)
DEPRECATED RDW RBC AUTO: 43 FL (ref 37–54)
EGFRCR SERPLBLD CKD-EPI 2021: 73.2 ML/MIN/1.73
ERYTHROCYTE [DISTWIDTH] IN BLOOD BY AUTOMATED COUNT: 13.2 % (ref 12.3–15.4)
GLOBULIN UR ELPH-MCNC: 3.1 GM/DL
GLUCOSE SERPL-MCNC: 205 MG/DL (ref 65–99)
HCT VFR BLD AUTO: 39.2 % (ref 34–46.6)
HGB BLD-MCNC: 12.7 G/DL (ref 12–15.9)
MCH RBC QN AUTO: 28.8 PG (ref 26.6–33)
MCHC RBC AUTO-ENTMCNC: 32.4 G/DL (ref 31.5–35.7)
MCV RBC AUTO: 88.9 FL (ref 79–97)
PLATELET # BLD AUTO: 233 10*3/MM3 (ref 140–450)
PMV BLD AUTO: 10.1 FL (ref 6–12)
POTASSIUM SERPL-SCNC: 4.1 MMOL/L (ref 3.5–5.2)
PROT SERPL-MCNC: 7.1 G/DL (ref 6–8.5)
RBC # BLD AUTO: 4.41 10*6/MM3 (ref 3.77–5.28)
SODIUM SERPL-SCNC: 137 MMOL/L (ref 136–145)
WBC NRBC COR # BLD: 9.77 10*3/MM3 (ref 3.4–10.8)

## 2023-03-21 PROCEDURE — 25010000002 DIPHENHYDRAMINE PER 50 MG: Performed by: INTERNAL MEDICINE

## 2023-03-21 PROCEDURE — 25010000002 HEPARIN (PORCINE) 2000-0.9 UNIT/L-% SOLUTION: Performed by: INTERNAL MEDICINE

## 2023-03-21 PROCEDURE — 99152 MOD SED SAME PHYS/QHP 5/>YRS: CPT | Performed by: INTERNAL MEDICINE

## 2023-03-21 PROCEDURE — 80053 COMPREHEN METABOLIC PANEL: CPT | Performed by: NURSE PRACTITIONER

## 2023-03-21 PROCEDURE — C1894 INTRO/SHEATH, NON-LASER: HCPCS | Performed by: INTERNAL MEDICINE

## 2023-03-21 PROCEDURE — 93458 L HRT ARTERY/VENTRICLE ANGIO: CPT | Performed by: INTERNAL MEDICINE

## 2023-03-21 PROCEDURE — 25010000002 FENTANYL CITRATE (PF) 50 MCG/ML SOLUTION: Performed by: INTERNAL MEDICINE

## 2023-03-21 PROCEDURE — 25010000002 HEPARIN (PORCINE) 1000-0.9 UT/500ML-% SOLUTION: Performed by: INTERNAL MEDICINE

## 2023-03-21 PROCEDURE — 25510000001 IOPAMIDOL PER 1 ML: Performed by: INTERNAL MEDICINE

## 2023-03-21 PROCEDURE — 25010000002 MIDAZOLAM PER 1 MG: Performed by: INTERNAL MEDICINE

## 2023-03-21 PROCEDURE — C1769 GUIDE WIRE: HCPCS | Performed by: INTERNAL MEDICINE

## 2023-03-21 PROCEDURE — 85027 COMPLETE CBC AUTOMATED: CPT | Performed by: NURSE PRACTITIONER

## 2023-03-21 RX ORDER — NYSTATIN 100000 [USP'U]/G
POWDER TOPICAL 2 TIMES DAILY
COMMUNITY

## 2023-03-21 RX ORDER — SODIUM CHLORIDE 9 MG/ML
100 INJECTION, SOLUTION INTRAVENOUS CONTINUOUS
Status: DISCONTINUED | OUTPATIENT
Start: 2023-03-21 | End: 2023-03-21 | Stop reason: HOSPADM

## 2023-03-21 RX ORDER — ACETAMINOPHEN 325 MG/1
650 TABLET ORAL EVERY 4 HOURS PRN
Status: DISCONTINUED | OUTPATIENT
Start: 2023-03-21 | End: 2023-03-21 | Stop reason: HOSPADM

## 2023-03-21 RX ORDER — NITROGLYCERIN 0.4 MG/1
0.4 TABLET SUBLINGUAL
Status: DISCONTINUED | OUTPATIENT
Start: 2023-03-21 | End: 2023-03-21 | Stop reason: HOSPADM

## 2023-03-21 RX ORDER — SODIUM CHLORIDE 0.9 % (FLUSH) 0.9 %
10 SYRINGE (ML) INJECTION EVERY 12 HOURS SCHEDULED
Status: DISCONTINUED | OUTPATIENT
Start: 2023-03-21 | End: 2023-03-21 | Stop reason: HOSPADM

## 2023-03-21 RX ORDER — HEPARIN SODIUM 200 [USP'U]/100ML
INJECTION, SOLUTION INTRAVENOUS
Status: DISCONTINUED | OUTPATIENT
Start: 2023-03-21 | End: 2023-03-21 | Stop reason: HOSPADM

## 2023-03-21 RX ORDER — SODIUM CHLORIDE 9 MG/ML
40 INJECTION, SOLUTION INTRAVENOUS AS NEEDED
Status: DISCONTINUED | OUTPATIENT
Start: 2023-03-21 | End: 2023-03-21 | Stop reason: HOSPADM

## 2023-03-21 RX ORDER — DIPHENHYDRAMINE HYDROCHLORIDE 50 MG/ML
INJECTION INTRAMUSCULAR; INTRAVENOUS
Status: DISCONTINUED | OUTPATIENT
Start: 2023-03-21 | End: 2023-03-21 | Stop reason: HOSPADM

## 2023-03-21 RX ORDER — ONDANSETRON 2 MG/ML
4 INJECTION INTRAMUSCULAR; INTRAVENOUS EVERY 6 HOURS PRN
Status: DISCONTINUED | OUTPATIENT
Start: 2023-03-21 | End: 2023-03-21 | Stop reason: HOSPADM

## 2023-03-21 RX ORDER — SODIUM CHLORIDE 0.9 % (FLUSH) 0.9 %
10 SYRINGE (ML) INJECTION AS NEEDED
Status: DISCONTINUED | OUTPATIENT
Start: 2023-03-21 | End: 2023-03-21 | Stop reason: HOSPADM

## 2023-03-21 RX ORDER — VERAPAMIL HYDROCHLORIDE 2.5 MG/ML
INJECTION, SOLUTION INTRAVENOUS
Status: DISCONTINUED | OUTPATIENT
Start: 2023-03-21 | End: 2023-03-21 | Stop reason: HOSPADM

## 2023-03-21 RX ORDER — LIDOCAINE HYDROCHLORIDE 20 MG/ML
INJECTION, SOLUTION INFILTRATION; PERINEURAL
Status: DISCONTINUED | OUTPATIENT
Start: 2023-03-21 | End: 2023-03-21 | Stop reason: HOSPADM

## 2023-03-21 RX ORDER — NYSTATIN 100000 U/G
1 CREAM TOPICAL 2 TIMES DAILY
COMMUNITY

## 2023-03-21 RX ORDER — MIDAZOLAM HYDROCHLORIDE 1 MG/ML
INJECTION INTRAMUSCULAR; INTRAVENOUS
Status: DISCONTINUED | OUTPATIENT
Start: 2023-03-21 | End: 2023-03-21 | Stop reason: HOSPADM

## 2023-03-21 RX ORDER — FENTANYL CITRATE 50 UG/ML
INJECTION, SOLUTION INTRAMUSCULAR; INTRAVENOUS
Status: DISCONTINUED | OUTPATIENT
Start: 2023-03-21 | End: 2023-03-21 | Stop reason: HOSPADM

## 2023-03-21 RX ORDER — SODIUM CHLORIDE 9 MG/ML
1-3 INJECTION, SOLUTION INTRAVENOUS CONTINUOUS
Status: DISCONTINUED | OUTPATIENT
Start: 2023-03-21 | End: 2023-03-21 | Stop reason: HOSPADM

## 2023-03-21 RX ORDER — OXYBUTYNIN CHLORIDE 5 MG/1
5 TABLET ORAL ONCE AS NEEDED
COMMUNITY

## 2023-03-21 RX ADMIN — SODIUM CHLORIDE 40 ML: 9 INJECTION, SOLUTION INTRAVENOUS at 14:09

## 2023-03-21 NOTE — NURSING NOTE
Dr. Denson given an update on the patient's blood pressure and overall status.  Stated she could be discharged home and he didn't need to see her before she left.

## 2023-03-21 NOTE — INTERVAL H&P NOTE
H&P reviewed. The patient was examined and there are no changes to the H&P.    Recommend cardiac catheterization, selective coronary angiography, left ventriculography and percutaneous coronary intervention with application of arteriotomy hemostatic closure device.    I discussed cardiac catheterization, the procedure, risks (including bleeding, infection, vascular damage [including minor oozing, bruising, bleeding, and up to and including but not limited to the need for vascular surgery, emergency cardiothoracic surgery, contrast reaction, renal failure, respiratory failure, heart attack, stroke, arrhythmia and even death), benefits, and alternatives and the patient has voiced understanding and is willing to proceed.    Adequate pre-hydration and post cardiac catheterization hydration.  Premedications as required and indicated for cardiac catheterization.    No contraindication to drug eluting stent placement if required  Further recommendations pending results of cardiac catheterization     Right radial arterial approach for cardiac cath.

## 2023-03-21 NOTE — NURSING NOTE
Dr Denson notified of patient's low blood pressure.  Dr. Denson is in room and stated patient should have a 400 ml bolus of normal saline.  Patient placed in Trendelenberg position.  Tolerated well.

## 2023-03-22 ENCOUNTER — TELEPHONE (OUTPATIENT)
Dept: CARDIOLOGY | Facility: CLINIC | Age: 68
End: 2023-03-22

## 2023-03-22 NOTE — TELEPHONE ENCOUNTER
Caller: Aida Ross    Relationship: Self    Best call back number: [phone]    What is the best time to reach you: ANYTIME    Who are you requesting to speak with (clinical staff, provider,  specific staff member): CLINICAL STAFF    Do you know the name of the person who called: AIDA ROSS    What was the call regarding: PATIENT HAD TO STOP TAKING HER DIABETIC MEDICATION AND BLOOD THINNER PRIOR TO HAVING HER HEART CATH DONE. SHE WOULD LIKE TO KNOW WHEN SHE CAN RESUME TAKING THESE MEDICATIONS.    Do you require a callback: YES

## 2023-04-03 RX ORDER — SACUBITRIL AND VALSARTAN 24; 26 MG/1; MG/1
TABLET, FILM COATED ORAL
Qty: 60 TABLET | Refills: 11 | Status: SHIPPED | OUTPATIENT
Start: 2023-04-03

## 2023-04-10 ENCOUNTER — OFFICE VISIT (OUTPATIENT)
Dept: CARDIAC SURGERY | Facility: CLINIC | Age: 68
End: 2023-04-10
Payer: MEDICARE

## 2023-04-10 VITALS
WEIGHT: 242 LBS | SYSTOLIC BLOOD PRESSURE: 86 MMHG | OXYGEN SATURATION: 94 % | BODY MASS INDEX: 47.51 KG/M2 | DIASTOLIC BLOOD PRESSURE: 52 MMHG | HEART RATE: 53 BPM | HEIGHT: 60 IN

## 2023-04-10 DIAGNOSIS — R07.9 CHEST PAIN IN ADULT: Primary | ICD-10-CM

## 2023-04-10 PROCEDURE — 3078F DIAST BP <80 MM HG: CPT | Performed by: SURGERY

## 2023-04-10 PROCEDURE — 99204 OFFICE O/P NEW MOD 45 MIN: CPT | Performed by: SURGERY

## 2023-04-10 PROCEDURE — 3074F SYST BP LT 130 MM HG: CPT | Performed by: SURGERY

## 2023-04-10 NOTE — LETTER
April 13, 2023       No Recipients    Patient: Aida Benitez   YOB: 1955   Date of Visit: 4/10/2023       Dear Landon Denson MD,    Aida Benitez was in my office today. Below are the relevant portions of my assessment and plan of care.    Ms. Benitez is a 66-year-old female who presents to me with severe multivessel coronary disease. She has stable anginal type symptoms. She has normal LVEF. She has many medical comorbidities including poorly controlled diabetes mellitus with last A1c being 9.4 percent about 4 months ago. She is morbidly obese with a BMI of 47.3. She has poor mobility and requires assistive devices to walk around. I have reviewed her coronary angiography and she has moderate to severe disease in all three coronaries, but not at left main. I discussed with her treatment options, including multivessel PCI versus coronary bypass grafting. Her morbid obesity makes her very high risk for coronary bypass grafting and we discussed postoperative expectations including possible admission to a rehabilitation facility/nursing home, she will need to walk afterwards. With this, she would like to pursue multivessel PCI if this is an option.     I plan to discuss this case with Dr. Denson regarding the possibility of multivessel PCI. I believe this is likely the right option if it's anatomically feasible. If he deems it not possible, then we will discuss returning to clinic and further discussing options for coronary artery bypass grafting.    Thank you for trusting me with the care of Ms. Benitez Please do not hesitate to call with questions or concerns.         Sincerely,        Lee Romero MD        CC:   No Recipients

## 2023-04-11 NOTE — PROGRESS NOTES
"Cardiac Surgery Consultation    Referring Physician: Dr. Landon Denson    Primary Care Physician: Unknown    Chief Complaint   Patient presents with   • Coronary Artery Disease     referred by Landon Denson MD 03/21/2023   • Dizziness     Pt states bp has been falling all day.          Subjective     Aida Benitez is a 67-year-old female patient who present today for consultation. She is accompanied by her daughter-in-law.     Ms. Benitez reports that she contracted COVID-19 in 10/2019 and developed atrial fibrillation and was air-flighted to [facility] in [place], [place]. In 01/2020 she suffered a myocardial infarction. She has not had any energy since. She denies any thoracic surgeries. She denies having stents placed when she suffered her heart attack.     On occasion she experiences \"really bad\" chest pains that start in her anterior chest and radiate around and behind her back. The chest pain usually occurs while she is active and subsides with rest, however, there have been times where she has been woken from her sleep due to chest pain.    She states that she does not have \"good breathing\" and is constantly out of breath with any exertion. She usually ambulates with the help of a walking cane due to the inability to walk far because of her dyspnea. However, today she is utilizing a wheelchair walker because her blood pressure has been low all day. She must sleep propped up on 2 pillows, otherwise she experiences orthopnea.     She complains that her blood pressure has been low all day. She woke up at approximately 4:00 a.m. and her blood pressure was low. At approximately 8:00 a.m. this morning, she became dizzy, developed a headache, and thought she was going to fall. She called her children for help. Her blood pressure in the clinic today is 86/52 mmHg.     She also experiences swelling in her bilateral lower extremities.    She has a history of uncontrolled diabetes mellitus type 2. She feels " her diabetes mellitus has been better since 10/2022 when her hemoglobin A1c and glucose was extremely elevated. She has since changed her diet. She has not changed medications.      The patient has never smoked.       Review of Systems   Constitutional: Positive for activity change and fatigue. Negative for unexpected weight change.   Respiratory: Positive for chest tightness and shortness of breath.    Cardiovascular: Positive for chest pain and leg swelling.   Musculoskeletal: Positive for back pain.        A complete review of systems was performed, is negative except stated above.    Past Medical History:   Diagnosis Date   • Anxiety    • Arrhythmia    • Arthritis    • Asthma    • Atrial fibrillation    • Bronchitis, chronic 1969   • CHF (congestive heart failure)    • Claustrophobia    • Clotting disorder    • COPD (chronic obstructive pulmonary disease)    • Coronary artery disease    • Coughing blood    • Depression    • Diabetes mellitus    • Disease of thyroid gland    • Food in larynx causing asphyxiation, initial encounter    • GERD (gastroesophageal reflux disease)    • Hiatal hernia 2014   • Hyperlipidemia    • Hypertension    • Hypothyroidism    • Myocardial infarction    • Restless leg    • Sleep apnea    • Vitamin B deficiency    • Vitamin D deficiency      Past Surgical History:   Procedure Laterality Date   • ARTHROSCOPY SHOULDER W/ OPEN ROTATOR CUFF REPAIR Right    • CARDIAC CATHETERIZATION N/A 03/21/2023    Procedure: Left Heart Cath;  Surgeon: Landon Denson MD;  Location: [facility] CATH INVASIVE LOC[facility];  Service: Cardiology;  Laterality: N/A;   • CATARACT EXTRACTION Bilateral    • FOOT NAVICULAR EXCISION OR BONE GRAFT Right    • GASTRIC SLEEVE LAPAROSCOPIC     • TUBAL ABDOMINAL LIGATION       Family History   Problem Relation Age of Onset   • Hypertension Mother    • Heart disease Mother    • Arrhythmia Mother    • Asthma Mother    • Cancer Mother    • Thyroid disease Mother    • Stroke Mother     • Hypertension Father    • Heart disease Father    • Colon polyps Father    • Prostate cancer Father    • Cancer Father    • Heart failure Father    • Hypertension Sister    • Heart attack Sister    • Atrial fibrillation Sister    • COPD Sister    • Cancer Brother    • Cancer Sister    • Cancer Brother    • Cancer Brother    • Cancer Sister    • Colon cancer Neg Hx      Social History     Tobacco Use   • Smoking status: Never   • Smokeless tobacco: Never   Vaping Use   • Vaping Use: Never used   Substance Use Topics   • Alcohol use: Never   • Drug use: Never     Current Outpatient Medications   Medication Sig Dispense Refill   • ALBUTEROL IN Inhale.     • amiodarone (PACERONE) 100 MG tablet Take 1 tablet by mouth Daily.     • apixaban (ELIQUIS) 5 MG tablet tablet Take 1 tablet by mouth 2 (Two) Times a Day. 60 tablet 11   • aspirin 81 MG EC tablet Take 1 tablet by mouth Daily. 30 tablet 11   • atorvastatin (LIPITOR) 40 MG tablet Take 2 tablets by mouth Every Night.     • B Complex Vitamins (VITAMIN B-COMPLEX PO) Take  by mouth Daily.     • budesonide-formoterol (SYMBICORT) 160-4.5 MCG/ACT inhaler Inhale 2 puffs 2 (Two) Times a Day.     • CALCIUM PO Take  by mouth Daily.     • carvedilol (COREG) 25 MG tablet Take 1 tablet by mouth 2 (Two) Times a Day With Meals.     • Cholecalciferol (VITAMIN D3 PO) Take  by mouth.     • DULoxetine (CYMBALTA) 30 MG capsule Take 1 capsule by mouth Daily.     • Entresto 24-26 MG tablet TAKE 1 TABLET BY MOUTH TWICE DAILY 60 tablet 11   • ezetimibe (ZETIA) 10 MG tablet Take 1 tablet by mouth Daily.     • ferrous sulfate 325 (65 FE) MG tablet Take 1 tablet by mouth Daily With Breakfast.     • furosemide (LASIX) 20 MG tablet Take 1 tablet by mouth Daily.     • gabapentin (NEURONTIN) 300 MG capsule Take 1 capsule by mouth 3 (Three) Times a Day.     • HYDROcodone-acetaminophen (NORCO)  MG per tablet Take 1 tablet by mouth Every 6 (Six) Hours As Needed for Moderate Pain.     •  "isosorbide mononitrate (IMDUR) 30 MG 24 hr tablet Take 1 tablet by mouth Daily.     • levothyroxine (SYNTHROID, LEVOTHROID) 88 MCG tablet Take 1 tablet by mouth Daily.     • metFORMIN (GLUCOPHAGE) 1000 MG tablet Take 1 tablet by mouth 2 (Two) Times a Day With Meals.     • nitroglycerin (NITROSTAT) 0.3 MG SL tablet Place 1 tablet under the tongue Every 5 (Five) Minutes As Needed for Chest Pain. Take no more than 3 doses in 15 minutes.     • nystatin (MYCOSTATIN) 628646 UNIT/GM cream Apply 1 application topically to the appropriate area as directed 2 (Two) Times a Day.     • nystatin (MYCOSTATIN) 642132 UNIT/GM powder Apply  topically to the appropriate area as directed 2 (Two) Times a Day.     • oxybutynin (DITROPAN) 5 MG tablet Take 1 tablet by mouth 1 (One) Time As Needed.     • pantoprazole (PROTONIX) 40 MG EC tablet Take 1 tablet by mouth Daily.     • rOPINIRole (REQUIP) 0.5 MG tablet Take 1 tablet by mouth Every Night. Take 1 hour before bedtime.     • spironolactone (ALDACTONE) 25 MG tablet Take 1 tablet by mouth Daily. 30 tablet 11   • vitamin D (ERGOCALCIFEROL) 1.25 MG (52407 UT) capsule capsule Take 1 capsule by mouth 2 (Two) Times a Week.       No current facility-administered medications for this visit.     Allergies:  Isosorb dinitrate-hydralazine and Niacin    Objective      Vital Signs  Visit Vitals  BP (!) 86/52   Pulse 53   Ht 152.4 cm (60\")   Wt 110 kg (242 lb)   SpO2 94%   BMI 47.26 kg/m²         Physical Exam  Vitals reviewed.   Constitutional:       General: She is not in acute distress.     Appearance: She is well-developed. She is obese. She is not diaphoretic.      Comments: Morbidly obese   HENT:      Head: Normocephalic and atraumatic.   Eyes:      General: No scleral icterus.     Pupils: Pupils are equal, round, and reactive to light.   Neck:      Vascular: No JVD.      Trachea: No tracheal deviation.   Cardiovascular:      Rate and Rhythm: Normal rate and regular rhythm.      Heart sounds: " Normal heart sounds. No murmur heard.  Pulmonary:      Effort: Pulmonary effort is normal. No respiratory distress.      Breath sounds: Normal breath sounds. No stridor. No wheezing.   Abdominal:      General: There is no distension.      Palpations: Abdomen is soft.      Tenderness: There is no abdominal tenderness.   Musculoskeletal:         General: No deformity. Normal range of motion.      Cervical back: Normal range of motion and neck supple.      Right lower leg: Edema present.      Left lower leg: Edema present.   Skin:     General: Skin is warm and dry.      Capillary Refill: Capillary refill takes less than 2 seconds.      Coloration: Skin is not pale.      Findings: No erythema or rash.   Neurological:      Mental Status: She is alert and oriented to person, place, and time.      Cranial Nerves: No cranial nerve deficit.   Psychiatric:         Behavior: Behavior normal.         Thought Content: Thought content normal.         Judgment: Judgment normal.         Results Review:     WBC   Date Value Ref Range Status   03/21/2023 9.77 3.40 - 10.80 10*3/mm3 Final   10/09/2020 3.7 (L) 4.8 - 10.8 K/uL Final     RBC   Date Value Ref Range Status   03/21/2023 4.41 3.77 - 5.28 10*6/mm3 Final   10/09/2020 4.62 4.20 - 5.40 M/uL Final   01/14/2020 5.08 4.20 - 5.40 10*6/uL Final     Hemoglobin   Date Value Ref Range Status   03/21/2023 12.7 12.0 - 15.9 g/dL Final   10/21/2020 11.3 (L) 12.5 - 16.0 g/dL Final     Hematocrit   Date Value Ref Range Status   03/21/2023 39.2 34.0 - 46.6 % Final   10/21/2020 38.3 37.0 - 47.0 % Final     MCV   Date Value Ref Range Status   03/21/2023 88.9 79.0 - 97.0 fL Final   10/09/2020 82.5 81.0 - 99.0 fL Final     MCH   Date Value Ref Range Status   03/21/2023 28.8 26.6 - 33.0 pg Final   10/09/2020 26.0 (L) 27.0 - 31.0 pg Final     MCHC   Date Value Ref Range Status   03/21/2023 32.4 31.5 - 35.7 g/dL Final   10/09/2020 31.5 (L) 33.0 - 37.0 g/dL Final     RDW   Date Value Ref Range Status    03/21/2023 13.2 12.3 - 15.4 % Final   10/09/2020 14.6 (H) 11.5 - 14.5 % Final     RDW-SD   Date Value Ref Range Status   03/21/2023 43.0 37.0 - 54.0 fl Final   01/14/2020 40.5 36.4 - 46.3 fL Final     MPV   Date Value Ref Range Status   03/21/2023 10.1 6.0 - 12.0 fL Final   10/09/2020 9.7 9.4 - 12.3 fL Final     Platelets   Date Value Ref Range Status   03/21/2023 233 140 - 450 10*3/mm3 Final   10/09/2020 174 130 - 400 K/uL Final   01/14/2020 231 150 - 450 10*3/uL Final     Neutrophil Rel %   Date Value Ref Range Status   10/09/2020 65.9 (H) 50.0 - 65.0 % Final     Lymphocyte Rel %   Date Value Ref Range Status   10/09/2020 28.9 20.0 - 40.0 % Final     Monocyte Rel %   Date Value Ref Range Status   10/09/2020 4.9 0.0 - 10.0 % Final     Eosinophil Rel %   Date Value Ref Range Status   10/09/2020 0 0.0 - 5.0 % Final     Basophil Rel %   Date Value Ref Range Status   10/09/2020 0.0 0.0 - 1.0 % Final     Immature Grans %   Date Value Ref Range Status   01/14/2020 0.4 % Final     Neutrophils Absolute   Date Value Ref Range Status   10/09/2020 2.4 1.5 - 7.5 K/uL Final   01/14/2020 4.3 1.5 - 6.6 10*3/uL Final     Lymphocytes Absolute   Date Value Ref Range Status   10/09/2020 1.1 1.1 - 4.5 K/uL Final     Monocytes Absolute   Date Value Ref Range Status   10/09/2020 0.20 0.00 - 0.90 K/uL Final     Eosinophils Absolute   Date Value Ref Range Status   10/09/2020 0.00 0.00 - 0.60 K/uL Final   01/14/2020 0.2 0.0 - 0.7 10*3/uL Final     Basophils Absolute   Date Value Ref Range Status   10/09/2020 0.00 0.00 - 0.20 K/uL Final   01/14/2020 0.1 0.0 - 0.1 10*3/uL Final     Immature Grans, Absolute   Date Value Ref Range Status   10/09/2020 0.0 K/uL Final   01/14/2020 0.0 0.0 - 0.2 10*3/uL Final     nRBC   Date Value Ref Range Status   01/14/2020 0.00 % Final     Glucose   Date Value Ref Range Status   03/21/2023 205 (H) 65 - 99 mg/dL Final     Sodium   Date Value Ref Range Status   03/21/2023 137 136 - 145 mmol/L Final    06/23/2021 139 136 - 145 mmol/L Final     Potassium   Date Value Ref Range Status   03/21/2023 4.1 3.5 - 5.2 mmol/L Final   06/23/2021 4.3 3.4 - 5.1 mmol/L Final     CO2   Date Value Ref Range Status   03/21/2023 25.0 22.0 - 29.0 mmol/L Final     Total CO2   Date Value Ref Range Status   06/23/2021 26 22 - 30 mmol/L Final     Chloride   Date Value Ref Range Status   03/21/2023 99 98 - 107 mmol/L Final   06/23/2021 104 98 - 107 mmol/L Final     Anion Gap   Date Value Ref Range Status   03/21/2023 13.0 5.0 - 15.0 mmol/L Final   06/23/2021 9 8 - 16 Final   10/23/2020 6 (L) 8 - 16 Final     Creatinine   Date Value Ref Range Status   03/21/2023 0.86 0.57 - 1.00 mg/dL Final   06/23/2021 0.9 0.5 - 1.1 mg/dL Final     BUN   Date Value Ref Range Status   03/21/2023 12 8 - 23 mg/dL Final   06/23/2021 11 7 - 17 mg/dL Final     BUN/Creatinine Ratio   Date Value Ref Range Status   03/21/2023 14.0 7.0 - 25.0 Final     Calcium   Date Value Ref Range Status   03/21/2023 8.8 8.6 - 10.5 mg/dL Final   06/23/2021 9.4 8.8 - 10.2 mg/dL Final     eGFR Non  Amer   Date Value Ref Range Status   06/23/2021 66 mL/min Final     Alkaline Phosphatase   Date Value Ref Range Status   03/21/2023 103 39 - 117 U/L Final   06/23/2021 93 38 - 126 U/L Final     Total Protein   Date Value Ref Range Status   03/21/2023 7.1 6.0 - 8.5 g/dL Final   06/23/2021 7.0 6.3 - 8.2 g/dL Final     ALT (SGPT)   Date Value Ref Range Status   03/21/2023 12 1 - 33 U/L Final   06/23/2021 17 7 - 52 U/L Final     AST (SGOT)   Date Value Ref Range Status   03/21/2023 22 1 - 32 U/L Final   06/23/2021 27 14 - 36 U/L Final     Total Bilirubin   Date Value Ref Range Status   03/21/2023 0.5 0.0 - 1.2 mg/dL Final   06/23/2021 0.5 0.2 - 1.3 mg/dL Final     Albumin   Date Value Ref Range Status   03/21/2023 4.0 3.5 - 5.2 g/dL Final   06/23/2021 3.9 3.5 - 5.7 g/dL Final     Globulin   Date Value Ref Range Status   03/21/2023 3.1 gm/dL Final        I reviewed the patient's  clinical results and discussed with patient.       CT angiogram coronary 01/20/2023  Coronary angiography:      Left Main: The left main is a normal caliber vessel with a normal take off from the left coronary cusp that   bifurcates to form a left anterior descending artery and a left circumflex artery.     Left anterior descending artery: Arises normally from the left main coronary artery and gives off septal and diagonal branches.     Left circumflex artery: Arises normally from the left main coronary artery and gives off obtuse marginal branches.     Right coronary artery: Arises normally from the right coronary cusp.  It bifurcates into the right posterior descending coronary artery and the right posterior lateral arteries.  Right coronary artery is dominant.     Right atrium: Normal size  No filling defects noted     Right ventricle: Normal size   No filling defects noted.     Left Atrium:  Left atrial size is normal in size  No left atrial appendage filling defect   Left Ventricle: The ventricular cavity size is within normal limits. There are no stigmata of prior infarction. There is no abnormal filling defect.      Coronary angiography:  See below     Pulmonary arteries: Main pulmonary artery is enlarged in size without any filling defects     Pulmonary veins: Normal pulmonary venous drainage. There were four  Noted pulmonary veins, two on the right and two on the left.      Pericardium:  Normal thickness with no significant effusion or calcium present.       Cardiac valves:  There is no thickening or calcifications in the aortic and mitral valves.      Aorta: Normal caliber.     Extra-cardiac findings: Arthritic changes of the spine noted  There are no significant extra-cardiac findings in the available limited views of the lungs, mediastinum, and abdomen.    No unusual findings for age        ______________________________________________________________________        Cardiac CT angiography   1/20/2023     Impression:      Total calcium score (using PARKER calcium score calculator): Heavy coronary calcification  Calcium score cannot be estimated due to confluent densities        Calcification of left main coronary artery  No obvious obstructive disease noted of left main coronary artery  Heavy calcification of left anterior descending coronary artery with more than 50% stenosis  Heavy coronary calcification of left circumflex coronary artery with more than 50% stenosis  Heavy coronary calcification of the right coronary artery  Suspect more than 50% stenosis  Overall suboptimal data quality due to decreased contrast opacification                        ___________________________________________________________________________     Recommendations:     Recommend coronary angiography  Ongoing risk factor modifications       Transthoracic echocardiogram dated 12/17/2022.    Interpretation Summary       •  Left ventricular systolic function is normal. Left ventricular ejection fraction appears to be 56 - 60%.  •  The following left ventricular wall segments are hypokinetic: apical anterior, apical lateral, apical inferior, apical septal and apex hypokinetic.  •  Left ventricular diastolic function is consistent with (grade Ia w/high LAP) impaired relaxation.  •  Left atrial volume is mildly increased.  •  RV apical hypokinesis  •  Estimated right ventricular systolic pressure from tricuspid regurgitation is normal (<35 mmHg).       Cardiac Catheterization Vascular study performed on 03/21/2023  Conclusion 3/21/2023     Coronary angiography:     No significant disease of left main coronary artery  Proximal left anterior descending coronary artery has heavy calcification with 70% stenosis  First diagonal branch has sequential 95% stenosis and is small to moderate in size  Ostium of second diagonal branch is 60% stenosis    Ostial and proximal left circumflex coronary artery 70% stenosis with heavy  atherosclerotic burden  Proximal left circumflex coronary artery has 70% stenosis     Distal right coronary artery has heavy atherosclerotic burden with eccentric 60% stenosis  Ostium of the first right posterior lateral artery and proximal portion has sequential 70% stenosis  Second right posterior lateral artery has 70% ostial stenosis  Third right posterior lateral artery has 50 to 60% stenosis  Ostium of right posterior descending coronary artery has eccentric 70% stenosis  Patent bilateral subclavian and internal mammary arteries     Left heart cath: Severely elevated left ventricular end-diastolic pressure of 27 mmHg with no gradient across aortic valve on pullback     LV Gram: Not performed        Plan    Given above findings will recommend multivessel aortocoronary bypass surgery  May consider maze procedure and left atrial appendage closure      I personally reviewed images of following exams; the following is my interpretation:    CT angiogram coronary: There is a severe stenosis around 70 to 80 percent in the proximal LAD. LAD distally has a large diagonal branch versus bifurcation of the LAD. The right coronary is dominant for the posterior circulation and there is a mid-severe stenosis of around 60 to 70 percent, there are multiple PL branches and two of the three have some proximal disease, although they are small distally. There is a graftable RCA proper between the PDA and PL branches, and the PDA also appears graftable. The circumflex has one major OM with sequential 60 to 70 percent stenoses of the ostium and midportion. The OM is small and not sure if graftable.    There are no significant aortic calcifications in the ascending aorta, unable to see the aortic branching pattern.    ECHO: Near normal LV function. I do not see significant valvular disease. Echo images are limited due to body habitus.          Assessment & Plan   Ms. Benitez is a 66-year-old female who presents to me with severe  multivessel coronary disease. She has stable anginal type symptoms. She has normal LVEF. She has many medical comorbidities including poorly controlled diabetes mellitus with last A1c being 9.4 percent about 4 months ago. She is morbidly obese with a BMI of 47.3. She has poor mobility and requires assistive devices to walk around. I have reviewed her coronary angiography and she has moderate to severe disease in all three coronaries, but not at left main. I discussed with her treatment options, including multivessel PCI versus coronary bypass grafting. Her morbid obesity makes her very high risk for coronary bypass grafting and we discussed postoperative expectations including possible admission to a rehabilitation facility/nursing home, she will need to walk afterwards. With this, she would like to pursue multivessel PCI if this is an option.     I plan to discuss this case with Dr. Denson regarding the possibility of multivessel PCI. I believe this is likely the right option if it's anatomically feasible. If he deems it not possible, then we will discuss returning to clinic and further discussing options for coronary artery bypass grafting.    Thank you for trusting me with the care of Ms. Benitez Please do not hesitate to call with questions or concerns.      Lee Romero MD   Cardiothoracic Surgeon    Transcribed from ambient dictation for Lee Romero MD by Shea Arguello.  04/10/23   20:00 CDT    Patient or patient representative verbalized consent to the visit recording.  I have personally performed the services described in this document as transcribed by the above individual, and it is both accurate and complete.

## 2023-04-27 ENCOUNTER — PATIENT ROUNDING (BHMG ONLY) (OUTPATIENT)
Dept: CARDIAC SURGERY | Facility: CLINIC | Age: 68
End: 2023-04-27
Payer: MEDICARE

## 2023-04-27 NOTE — PROGRESS NOTES
A [employer] message has been sent to the patient for PATIENT ROUNDING with [facility] [place] [facility].

## 2023-05-02 ENCOUNTER — OFFICE VISIT (OUTPATIENT)
Dept: GASTROENTEROLOGY | Facility: CLINIC | Age: 68
End: 2023-05-02
Payer: MEDICARE

## 2023-05-02 VITALS
HEIGHT: 60 IN | WEIGHT: 240 LBS | HEART RATE: 71 BPM | DIASTOLIC BLOOD PRESSURE: 62 MMHG | TEMPERATURE: 95.1 F | BODY MASS INDEX: 47.12 KG/M2 | OXYGEN SATURATION: 96 % | SYSTOLIC BLOOD PRESSURE: 146 MMHG

## 2023-05-02 DIAGNOSIS — D68.9 COAGULOPATHY: ICD-10-CM

## 2023-05-02 DIAGNOSIS — R13.14 PHARYNGOESOPHAGEAL DYSPHAGIA: Primary | ICD-10-CM

## 2023-05-02 PROCEDURE — 99214 OFFICE O/P EST MOD 30 MIN: CPT | Performed by: NURSE PRACTITIONER

## 2023-05-02 PROCEDURE — 1159F MED LIST DOCD IN RCRD: CPT | Performed by: NURSE PRACTITIONER

## 2023-05-02 PROCEDURE — 3077F SYST BP >= 140 MM HG: CPT | Performed by: NURSE PRACTITIONER

## 2023-05-02 PROCEDURE — 3078F DIAST BP <80 MM HG: CPT | Performed by: NURSE PRACTITIONER

## 2023-05-02 PROCEDURE — 1160F RVW MEDS BY RX/DR IN RCRD: CPT | Performed by: NURSE PRACTITIONER

## 2023-05-12 ENCOUNTER — TELEPHONE (OUTPATIENT)
Dept: CARDIOLOGY | Facility: CLINIC | Age: 68
End: 2023-05-12
Payer: MEDICARE

## 2023-05-15 PROBLEM — I25.118 CORONARY ARTERY DISEASE OF NATIVE ARTERY OF NATIVE HEART WITH STABLE ANGINA PECTORIS: Status: ACTIVE | Noted: 2023-01-05

## 2023-05-15 NOTE — PROGRESS NOTES
Chief Complaint  Congestive Heart Failure (4wk F/U. Started Aldactone LOV), Coronary Artery Disease (S/P cath), and Results (Lab)    Subjective          Aida Benitez presents to [id number] for routine follow-up of outpatient procedure and medication adjustment. She was started on spironolactone 25 mg daily at her last office visit on 3/9/2023. Follow up CMP results from 3/21/2023 revealed normal creatinine and potassium.  Left heart catheterization on 3/10/2023 revealed multivessel coronary artery disease.  She was referred to CT surgery and saw Dr. Lee Romero on 4/10/2023 at which time he recommended multivessel PCI over CABG. She has ischemic heart disease, chronic diastolic congestive heart failure, multivessel coronary artery disease, paroxysmal atrial fibrillation on chronic anticoagulation, type 2 diabetes mellitus, hypertension, hyperlipidemia, type 2 diabetes mellitus and morbid obesity. She continues to complain of chronic dyspnea with mild exertion. She reports orthopnea, requiring two pillows to sleep. She complains of diaphoresis with exertion. She reports intermittent, non-exertional, substernal chest pain that radiates to the upper back. The pain occurs a few times a week and lasts for less than a minute at a time before resolving on its own. She states that she has had to take nitroglycerin on occasion. Patient denies palpitations, dizziness, syncope, orthopnea, PND, edema or decreased stamina.  Patient denies any signs of bleeding.    Congestive Heart Failure  Presents for follow-up visit. Associated symptoms include chest pain, orthopnea and shortness of breath. Pertinent negatives include no chest pressure, claudication, edema, muscle weakness, near-syncope, nocturia, palpitations, paroxysmal nocturnal dyspnea or unexpected weight change. The symptoms have been stable. Her past medical history is significant for CAD. Compliance with total regimen is 51-75%.  Compliance with diet is 51-75%. Compliance with exercise is 51-75%. Compliance with medications is %.   Atrial Fibrillation  Presents for follow-up visit. Symptoms include chest pain and shortness of breath. Symptoms are negative for an AICD problem, bradycardia, dizziness, hemodynamic instability, hypertension, hypotension, pacemaker problem, palpitations, syncope and tachycardia. The symptoms have been stable. Past medical history includes atrial fibrillation, CAD, CHF and hyperlipidemia.   Hypertension  This is a chronic problem. The current episode started more than 1 year ago. The problem is controlled. Associated symptoms include chest pain and shortness of breath. Pertinent negatives include no anxiety, blurred vision, malaise/fatigue, orthopnea, palpitations, peripheral edema, PND or sweats. Risk factors for coronary artery disease include obesity, post-menopausal state, dyslipidemia and diabetes mellitus. Current antihypertension treatment includes beta blockers and diuretics. The current treatment provides significant improvement. Hypertensive end-organ damage includes heart failure. Identifiable causes of hypertension include sleep apnea.   Hyperlipidemia  This is a chronic problem. The current episode started more than 1 year ago. Exacerbating diseases include diabetes and obesity. Associated symptoms include chest pain and shortness of breath. Current antihyperlipidemic treatment includes statins. Risk factors for coronary artery disease include hypertension, obesity, post-menopausal, dyslipidemia and diabetes mellitus.   Coronary Artery Disease  Presents for follow-up visit. Symptoms include chest pain and shortness of breath. Pertinent negatives include no chest pressure, chest tightness, dizziness, leg swelling, muscle weakness, palpitations or weight gain. Risk factors include hyperlipidemia and obesity. Risk factors do not include hypertension. Her past medical history is significant for CHF.  The symptoms have been stable. Compliance with diet is variable. Compliance with exercise is variable. Compliance with medications is good.   I have reviewed and confirmed the accuracy of the ROS  CHAIM Young      Objective     Current Outpatient Medications:     ALBUTEROL IN, Inhale., Disp: , Rfl:     amiodarone (PACERONE) 100 MG tablet, Take 1 tablet by mouth Daily., Disp: , Rfl:     apixaban (ELIQUIS) 5 MG tablet tablet, Take 1 tablet by mouth 2 (Two) Times a Day., Disp: 60 tablet, Rfl: 11    aspirin 81 MG EC tablet, Take 1 tablet by mouth Daily., Disp: 30 tablet, Rfl: 11    atorvastatin (LIPITOR) 40 MG tablet, Take 2 tablets by mouth Every Night., Disp: , Rfl:     B Complex Vitamins (VITAMIN B-COMPLEX PO), Take  by mouth Daily., Disp: , Rfl:     budesonide-formoterol (SYMBICORT) 160-4.5 MCG/ACT inhaler, Inhale 2 puffs 2 (Two) Times a Day., Disp: , Rfl:     CALCIUM PO, Take  by mouth Daily., Disp: , Rfl:     carvedilol (COREG) 12.5 MG tablet, Take 1 tablet by mouth 2 (Two) Times a Day With Meals., Disp: 180 tablet, Rfl: 3    Cholecalciferol (VITAMIN D3 PO), Take  by mouth., Disp: , Rfl:     DULoxetine (CYMBALTA) 30 MG capsule, Take 1 capsule by mouth Daily., Disp: , Rfl:     Entresto 24-26 MG tablet, TAKE 1 TABLET BY MOUTH TWICE DAILY, Disp: 60 tablet, Rfl: 11    ezetimibe (ZETIA) 10 MG tablet, Take 1 tablet by mouth Daily., Disp: , Rfl:     ferrous sulfate 325 (65 FE) MG tablet, Take 1 tablet by mouth Daily With Breakfast., Disp: , Rfl:     furosemide (LASIX) 20 MG tablet, Take 1 tablet by mouth Daily., Disp: , Rfl:     gabapentin (NEURONTIN) 300 MG capsule, Take 1 capsule by mouth 3 (Three) Times a Day., Disp: , Rfl:     HYDROcodone-acetaminophen (NORCO)  MG per tablet, Take 1 tablet by mouth Every 6 (Six) Hours As Needed for Moderate Pain., Disp: , Rfl:     isosorbide mononitrate (IMDUR) 60 MG 24 hr tablet, Take 1 tablet by mouth Daily., Disp: 90 tablet, Rfl: 3    Lantus 100 UNIT/ML  "injection, INJECT 80 UNITS SUBCUTANEOUS TWICE DAILY, Disp: , Rfl:     levothyroxine (SYNTHROID, LEVOTHROID) 88 MCG tablet, Take 1 tablet by mouth Daily., Disp: , Rfl:     metFORMIN (GLUCOPHAGE) 1000 MG tablet, Take 1 tablet by mouth 2 (Two) Times a Day With Meals., Disp: , Rfl:     nitroglycerin (NITROSTAT) 0.3 MG SL tablet, Place 1 tablet under the tongue Every 5 (Five) Minutes As Needed for Chest Pain. Take no more than 3 doses in 15 minutes., Disp: , Rfl:     nystatin (MYCOSTATIN) 658032 UNIT/GM cream, Apply 1 application topically to the appropriate area as directed 2 (Two) Times a Day., Disp: , Rfl:     nystatin (MYCOSTATIN) 805358 UNIT/GM powder, Apply  topically to the appropriate area as directed 2 (Two) Times a Day., Disp: , Rfl:     oxybutynin (DITROPAN) 5 MG tablet, Take 1 tablet by mouth 1 (One) Time As Needed., Disp: , Rfl:     pantoprazole (PROTONIX) 40 MG EC tablet, Take 1 tablet by mouth Daily., Disp: , Rfl:     rOPINIRole (REQUIP) 0.5 MG tablet, Take 1 tablet by mouth Every Night. Take 1 hour before bedtime., Disp: , Rfl:     spironolactone (ALDACTONE) 25 MG tablet, Take 1 tablet by mouth Daily., Disp: 30 tablet, Rfl: 11    vitamin D (ERGOCALCIFEROL) 1.25 MG (57715 UT) capsule capsule, Take 1 capsule by mouth 2 (Two) Times a Week., Disp: , Rfl:   Vital Signs:   /64   Pulse 72   Ht 152.4 cm (60\")   Wt 109 kg (241 lb)   SpO2 96%   BMI 47.07 kg/m²     Vitals and nursing note reviewed.   Constitutional:       General: Not in acute distress.     Appearance: Normal and healthy appearance. Well-developed and not in distress. Morbidly obese. Not diaphoretic.   Eyes:      General: Lids are normal.         Right eye: No discharge.         Left eye: No discharge.      Conjunctiva/sclera: Conjunctivae normal.      Pupils: Pupils are equal, round, and reactive to light.   HENT:      Head: Normocephalic and atraumatic.      Jaw: There is normal jaw occlusion.      Right Ear: External ear normal.      " Left Ear: External ear normal.      Nose: Nose normal.   Neck:      Thyroid: No thyromegaly.      Vascular: No carotid bruit, JVD or JVR. JVD normal.      Trachea: Trachea normal. No tracheal deviation.   Pulmonary:      Effort: Pulmonary effort is normal. No respiratory distress.      Breath sounds: Decreased air movement present. Examination of the right-lower field reveals decreased breath sounds. Examination of the left-lower field reveals decreased breath sounds. Decreased breath sounds present. No wheezing. No rhonchi. No rales.   Chest:      Chest wall: Not tender to palpatation.   Cardiovascular:      PMI at left midclavicular line. Normal rate. Regular rhythm. Normal S1. Normal S2.       Murmurs: There is no murmur.      No gallop.  No click. No rub.   Pulses:     Intact distal pulses. No decreased pulses.   Edema:     Peripheral edema absent.   Abdominal:      General: Bowel sounds are normal. There is no distension.      Palpations: Abdomen is soft.      Tenderness: There is no abdominal tenderness.   Musculoskeletal: Normal range of motion.         General: No tenderness or deformity.      Cervical back: Normal range of motion and neck supple. Skin:     General: Skin is warm and dry.      Coloration: Skin is not pale.      Findings: No erythema or rash.   Neurological:      General: No focal deficit present.      Mental Status: Alert, oriented to person, place, and time and oriented to person, place and time.   Psychiatric:         Attention and Perception: Attention and perception normal.         Mood and Affect: Mood and affect normal.         Speech: Speech normal.         Behavior: Behavior normal.         Thought Content: Thought content normal.         Cognition and Memory: Cognition and memory normal.         Judgment: Judgment normal.      Result Review :   The following data was reviewed by: CHAIM Young on 05/16/2023:  Common labs          1/20/2023    11:47 3/21/2023    07:13  3/21/2023    07:44   Common Labs   Glucose   205     BUN   12     Creatinine 0.64    0.86     Sodium   137     Potassium   4.1     Chloride   99     Calcium   8.8     Albumin   4.0     Total Bilirubin   0.5     Alkaline Phosphatase   103     AST (SGOT)   22     ALT (SGPT)   12     WBC  9.77      Hemoglobin  12.7      Hematocrit  39.2      Platelets  233        Data reviewed: Cardiology studies holter monitor 12/11/22, 2d echo 12/17/22, lexiscan 12/16/22 and CTA of the coronary arteries 1/20/23    ECG 12 Lead    Date/Time: 5/16/2023 2:15 PM  Performed by: Trudy Pierre APRN  Authorized by: Trudy Pierre APRN   Comparison: compared with previous ECG from 11/15/2022  Rhythm: sinus rhythm  Rate: normal  BPM: 72    Clinical impression: abnormal EKG          Assessment and Plan    Diagnoses and all orders for this visit:    1. Chronic diastolic congestive heart failure (HCC) (Primary)-NYHA class II.  Stage C.  Compensated. Reviewed signs and symptoms of CHF and what to report with the patient. Patient instructed to restrict sodium and weigh daily. Report weight gain of greater than 2 lbs overnight or 5 lbs in 1 week. Pt verbalized understanding of instructions and plan of care.  Pt has failed Jardiance in the past due to candidiasis.   Continue spironolactone and Entresto. Resume carvedilol at lower dose of 12.5 mg twice daily.     2. PAF (paroxysmal atrial fibrillation) (HCC)- in sinus rhythm today.  Anticoagulated.  Stable.  Continue amiodarone.    3. Current use of long term anticoagulation-patient continues on Eliquis.  Denies bleeding.    4. Primary hypertension-blood pressure is well controlled. Continue to monitor following above medication adjustment.  Continue Entresto, spironolactone and carvedilol.  Monitor and record daily blood pressure. Report readings consistently higher than 130/80 or consistently lower than 100/60.     5. Mixed hyperlipidemia-management per PCP.  Continue atorvastatin.    6.  Uncontrolled type 2 diabetes mellitus with hyperglycemia (HCC)- type 2 diabetes mellitus in the setting of congestive heart failure.  Patient would likely benefit from addition of Jardiance in the future, if tolerated.  Management per PCP.    7. Class 3 severe obesity due to excess calories with serious comorbidity and body mass index (BMI) of 45.0 to 49.9 in adult (HCC)- Class 3 Severe Obesity (BMI >=40). Obesity-related health conditions include the following: obstructive sleep apnea, hypertension, diabetes mellitus, dyslipidemias and GERD. Obesity is unchanged. BMI is is above average; no BMI management plan is appropriate. We discussed low calorie, low carb based diet program, portion control and increasing exercise.    8. Coronary artery disease of native artery of native heart with stable angina pectoris- discussed case with Dr. Denson. He recommends referral to CT surgery at [place] in [place], [place] for high risk CABG. Increase Imdur to 60 mg daily.       Follow Up   Return in about 4 weeks (around 6/13/2023) for Next scheduled follow up.  Patient was given instructions and counseling regarding her condition or for health maintenance advice. Please see specific information pulled into the AVS if appropriate.

## 2023-05-16 ENCOUNTER — PREP FOR SURGERY (OUTPATIENT)
Dept: GASTROENTEROLOGY | Facility: CLINIC | Age: 68
End: 2023-05-16
Payer: MEDICARE

## 2023-05-16 ENCOUNTER — TELEPHONE (OUTPATIENT)
Dept: GASTROENTEROLOGY | Facility: CLINIC | Age: 68
End: 2023-05-16
Payer: MEDICARE

## 2023-05-16 ENCOUNTER — OFFICE VISIT (OUTPATIENT)
Dept: CARDIOLOGY | Facility: CLINIC | Age: 68
End: 2023-05-16
Payer: MEDICARE

## 2023-05-16 VITALS
HEART RATE: 72 BPM | DIASTOLIC BLOOD PRESSURE: 64 MMHG | OXYGEN SATURATION: 96 % | SYSTOLIC BLOOD PRESSURE: 128 MMHG | WEIGHT: 241 LBS | BODY MASS INDEX: 47.32 KG/M2 | HEIGHT: 60 IN

## 2023-05-16 DIAGNOSIS — I50.32 CHRONIC DIASTOLIC CONGESTIVE HEART FAILURE: Primary | ICD-10-CM

## 2023-05-16 DIAGNOSIS — E78.2 MIXED HYPERLIPIDEMIA: ICD-10-CM

## 2023-05-16 DIAGNOSIS — I48.0 PAF (PAROXYSMAL ATRIAL FIBRILLATION): ICD-10-CM

## 2023-05-16 DIAGNOSIS — E11.65 UNCONTROLLED TYPE 2 DIABETES MELLITUS WITH HYPERGLYCEMIA: ICD-10-CM

## 2023-05-16 DIAGNOSIS — I10 PRIMARY HYPERTENSION: ICD-10-CM

## 2023-05-16 DIAGNOSIS — I25.118 CORONARY ARTERY DISEASE OF NATIVE ARTERY OF NATIVE HEART WITH STABLE ANGINA PECTORIS: ICD-10-CM

## 2023-05-16 DIAGNOSIS — Z79.01 CURRENT USE OF LONG TERM ANTICOAGULATION: ICD-10-CM

## 2023-05-16 DIAGNOSIS — E66.01 CLASS 3 SEVERE OBESITY DUE TO EXCESS CALORIES WITH SERIOUS COMORBIDITY AND BODY MASS INDEX (BMI) OF 45.0 TO 49.9 IN ADULT: ICD-10-CM

## 2023-05-16 DIAGNOSIS — R13.14 PHARYNGOESOPHAGEAL DYSPHAGIA: Primary | ICD-10-CM

## 2023-05-16 RX ORDER — ISOSORBIDE MONONITRATE 60 MG/1
60 TABLET, EXTENDED RELEASE ORAL DAILY
Qty: 90 TABLET | Refills: 3 | Status: SHIPPED | OUTPATIENT
Start: 2023-05-16

## 2023-05-16 RX ORDER — CARVEDILOL 12.5 MG/1
12.5 TABLET ORAL 2 TIMES DAILY WITH MEALS
Qty: 180 TABLET | Refills: 3 | Status: SHIPPED | OUTPATIENT
Start: 2023-05-16

## 2023-05-16 RX ORDER — INSULIN GLARGINE 100 [IU]/ML
INJECTION, SOLUTION SUBCUTANEOUS
COMMUNITY
Start: 2023-05-10

## 2023-05-16 NOTE — TELEPHONE ENCOUNTER
Please let patient know that Dr. Denson approves her to hold eliquis 48 hours prior to egd. I will put in case request for egd. Thank you

## 2023-06-09 ENCOUNTER — TELEPHONE (OUTPATIENT)
Dept: GASTROENTEROLOGY | Facility: CLINIC | Age: 68
End: 2023-06-09
Payer: MEDICARE

## 2023-06-09 NOTE — TELEPHONE ENCOUNTER
I saw her recently for dysphagia. You approved her to hold eliquis prior to egd.  Now she is to see a cts in [place] next week for eval for bypass surgery. Do you still think ok to hold eliquis and proceed with egd ? Thank you

## 2023-06-13 ENCOUNTER — OFFICE VISIT (OUTPATIENT)
Dept: CARDIOLOGY | Facility: CLINIC | Age: 68
End: 2023-06-13
Payer: MEDICARE

## 2023-06-13 VITALS
DIASTOLIC BLOOD PRESSURE: 70 MMHG | HEIGHT: 60 IN | BODY MASS INDEX: 49.28 KG/M2 | OXYGEN SATURATION: 98 % | SYSTOLIC BLOOD PRESSURE: 126 MMHG | HEART RATE: 66 BPM | WEIGHT: 251 LBS

## 2023-06-13 DIAGNOSIS — E11.65 UNCONTROLLED TYPE 2 DIABETES MELLITUS WITH HYPERGLYCEMIA: ICD-10-CM

## 2023-06-13 DIAGNOSIS — I48.0 PAF (PAROXYSMAL ATRIAL FIBRILLATION): ICD-10-CM

## 2023-06-13 DIAGNOSIS — I25.118 CORONARY ARTERY DISEASE OF NATIVE ARTERY OF NATIVE HEART WITH STABLE ANGINA PECTORIS: ICD-10-CM

## 2023-06-13 DIAGNOSIS — E78.2 MIXED HYPERLIPIDEMIA: ICD-10-CM

## 2023-06-13 DIAGNOSIS — I50.32 CHRONIC DIASTOLIC CONGESTIVE HEART FAILURE: Primary | ICD-10-CM

## 2023-06-13 DIAGNOSIS — I10 PRIMARY HYPERTENSION: ICD-10-CM

## 2023-06-13 DIAGNOSIS — E66.01 CLASS 3 SEVERE OBESITY DUE TO EXCESS CALORIES WITH SERIOUS COMORBIDITY AND BODY MASS INDEX (BMI) OF 45.0 TO 49.9 IN ADULT: ICD-10-CM

## 2023-06-13 DIAGNOSIS — Z79.01 CURRENT USE OF LONG TERM ANTICOAGULATION: ICD-10-CM

## 2023-06-13 RX ORDER — ISOSORBIDE MONONITRATE 120 MG/1
120 TABLET, EXTENDED RELEASE ORAL DAILY
Qty: 90 TABLET | Refills: 3 | Status: SHIPPED | OUTPATIENT
Start: 2023-06-13

## 2023-06-13 NOTE — PROGRESS NOTES
Chief Complaint  Congestive Heart Failure (4wk F/U. Resumed Coreg) and Coronary Artery Disease (Increased Imdur LOV)    Subjective          Aida Benitez presents to [facility] [id number] for routine follow-up of medication adjustment.  Imdur was increased to 60 mg daily and carvedilol was resumed at a lower dose of 12.5 mg twice daily at her last office visit on 5/16/2023. She has ischemic heart disease, chronic diastolic congestive heart failure, multivessel coronary artery disease, paroxysmal atrial fibrillation on chronic anticoagulation, type 2 diabetes mellitus, hypertension, hyperlipidemia, type 2 diabetes mellitus and morbid obesity. She continues to complain of chronic dyspnea with mild exertion. She reports orthopnea, requiring two pillows to sleep. She complains of diaphoresis with exertion. She reports intermittent, non-exertional, substernal chest pain that radiates to the upper back. The pain occurs a few times a week and lasts for less than a minute at a time before resolving on its own. She states that she has had to take nitroglycerin on occasion. Patient denies palpitations, dizziness, syncope, orthopnea, PND, edema or decreased stamina.  Patient denies any signs of bleeding.    Congestive Heart Failure  Presents for follow-up visit. Associated symptoms include chest pain, orthopnea and shortness of breath. Pertinent negatives include no chest pressure, claudication, edema, muscle weakness, near-syncope, nocturia, palpitations, paroxysmal nocturnal dyspnea or unexpected weight change. The symptoms have been stable. Her past medical history is significant for CAD. Compliance with total regimen is 51-75%. Compliance with diet is 51-75%. Compliance with exercise is 51-75%. Compliance with medications is %.   Atrial Fibrillation  Presents for follow-up visit. Symptoms include chest pain and shortness of breath. Symptoms are negative for an AICD problem, bradycardia,  dizziness, hemodynamic instability, hypertension, hypotension, pacemaker problem, palpitations, syncope and tachycardia. The symptoms have been stable. Past medical history includes atrial fibrillation, CAD, CHF and hyperlipidemia.   Hypertension  This is a chronic problem. The current episode started more than 1 year ago. The problem is controlled. Associated symptoms include chest pain and shortness of breath. Pertinent negatives include no anxiety, blurred vision, malaise/fatigue, orthopnea, palpitations, peripheral edema, PND or sweats. Risk factors for coronary artery disease include obesity, post-menopausal state, dyslipidemia and diabetes mellitus. Current antihypertension treatment includes beta blockers and diuretics. The current treatment provides significant improvement. Hypertensive end-organ damage includes heart failure. Identifiable causes of hypertension include sleep apnea.   Hyperlipidemia  This is a chronic problem. The current episode started more than 1 year ago. Exacerbating diseases include diabetes and obesity. Associated symptoms include chest pain and shortness of breath. Current antihyperlipidemic treatment includes statins. Risk factors for coronary artery disease include hypertension, obesity, post-menopausal, dyslipidemia and diabetes mellitus.   Coronary Artery Disease  Presents for follow-up visit. Symptoms include chest pain and shortness of breath. Pertinent negatives include no chest pressure, chest tightness, dizziness, leg swelling, muscle weakness, palpitations or weight gain. Risk factors include hyperlipidemia and obesity. Risk factors do not include hypertension. Her past medical history is significant for CHF. The symptoms have been stable. Compliance with diet is variable. Compliance with exercise is variable. Compliance with medications is good.     I have reviewed and confirmed the accuracy of the ROS CHAIM Young      Objective     Current Outpatient  Medications:     ALBUTEROL IN, Inhale., Disp: , Rfl:     amiodarone (PACERONE) 100 MG tablet, Take 1 tablet by mouth Daily., Disp: , Rfl:     apixaban (ELIQUIS) 5 MG tablet tablet, Take 1 tablet by mouth 2 (Two) Times a Day., Disp: 60 tablet, Rfl: 11    aspirin 81 MG EC tablet, Take 1 tablet by mouth Daily., Disp: 30 tablet, Rfl: 11    atorvastatin (LIPITOR) 40 MG tablet, Take 2 tablets by mouth Every Night., Disp: , Rfl:     B Complex Vitamins (VITAMIN B-COMPLEX PO), Take  by mouth Daily., Disp: , Rfl:     budesonide-formoterol (SYMBICORT) 160-4.5 MCG/ACT inhaler, Inhale 2 puffs 2 (Two) Times a Day., Disp: , Rfl:     CALCIUM PO, Take  by mouth Daily., Disp: , Rfl:     carvedilol (COREG) 12.5 MG tablet, Take 1 tablet by mouth 2 (Two) Times a Day With Meals., Disp: 180 tablet, Rfl: 3    Cholecalciferol (VITAMIN D3 PO), Take  by mouth., Disp: , Rfl:     DULoxetine (CYMBALTA) 30 MG capsule, Take 1 capsule by mouth Daily., Disp: , Rfl:     Entresto 24-26 MG tablet, TAKE 1 TABLET BY MOUTH TWICE DAILY, Disp: 60 tablet, Rfl: 11    ezetimibe (ZETIA) 10 MG tablet, Take 1 tablet by mouth Daily., Disp: , Rfl:     ferrous sulfate 325 (65 FE) MG tablet, Take 1 tablet by mouth Daily With Breakfast., Disp: , Rfl:     furosemide (LASIX) 20 MG tablet, Take 1 tablet by mouth Daily., Disp: , Rfl:     gabapentin (NEURONTIN) 300 MG capsule, Take 1 capsule by mouth 3 (Three) Times a Day., Disp: , Rfl:     HYDROcodone-acetaminophen (NORCO)  MG per tablet, Take 1 tablet by mouth Every 6 (Six) Hours As Needed for Moderate Pain., Disp: , Rfl:     isosorbide mononitrate (IMDUR) 120 MG 24 hr tablet, Take 1 tablet by mouth Daily., Disp: 90 tablet, Rfl: 3    Lantus 100 UNIT/ML injection, INJECT 80 UNITS SUBCUTANEOUS TWICE DAILY, Disp: , Rfl:     levothyroxine (SYNTHROID, LEVOTHROID) 88 MCG tablet, Take 1 tablet by mouth Daily., Disp: , Rfl:     metFORMIN (GLUCOPHAGE) 1000 MG tablet, Take 1 tablet by mouth 2 (Two) Times a Day With Meals.,  "Disp: , Rfl:     nitroglycerin (NITROSTAT) 0.3 MG SL tablet, Place 1 tablet under the tongue Every 5 (Five) Minutes As Needed for Chest Pain. Take no more than 3 doses in 15 minutes., Disp: , Rfl:     nystatin (MYCOSTATIN) 577843 UNIT/GM cream, Apply 1 application topically to the appropriate area as directed 2 (Two) Times a Day., Disp: , Rfl:     nystatin (MYCOSTATIN) 517872 UNIT/GM powder, Apply  topically to the appropriate area as directed 2 (Two) Times a Day., Disp: , Rfl:     oxybutynin (DITROPAN) 5 MG tablet, Take 1 tablet by mouth 1 (One) Time As Needed., Disp: , Rfl:     pantoprazole (PROTONIX) 40 MG EC tablet, Take 1 tablet by mouth Daily., Disp: , Rfl:     rOPINIRole (REQUIP) 0.5 MG tablet, Take 1 tablet by mouth Every Night. Take 1 hour before bedtime., Disp: , Rfl:     spironolactone (ALDACTONE) 25 MG tablet, Take 1 tablet by mouth Daily., Disp: 30 tablet, Rfl: 11    vitamin D (ERGOCALCIFEROL) 1.25 MG (32721 UT) capsule capsule, Take 1 capsule by mouth 2 (Two) Times a Week., Disp: , Rfl:   Vital Signs:   /70   Pulse 66   Ht 152.4 cm (60\")   Wt 114 kg (251 lb)   SpO2 98%   BMI 49.02 kg/m²     Vitals and nursing note reviewed.   Constitutional:       General: Not in acute distress.     Appearance: Normal and healthy appearance. Well-developed and not in distress. Morbidly obese. Not diaphoretic.   Eyes:      General: Lids are normal.         Right eye: No discharge.         Left eye: No discharge.      Conjunctiva/sclera: Conjunctivae normal.      Pupils: Pupils are equal, round, and reactive to light.   HENT:      Head: Normocephalic and atraumatic.      Jaw: There is normal jaw occlusion.      Right Ear: External ear normal.      Left Ear: External ear normal.      Nose: Nose normal.   Neck:      Thyroid: No thyromegaly.      Vascular: No carotid bruit, JVD or JVR. JVD normal.      Trachea: Trachea normal. No tracheal deviation.   Pulmonary:      Effort: Pulmonary effort is normal. No " respiratory distress.      Breath sounds: Decreased air movement present. Examination of the right-lower field reveals decreased breath sounds. Examination of the left-lower field reveals decreased breath sounds. Decreased breath sounds present. No wheezing. No rhonchi. No rales.   Chest:      Chest wall: Not tender to palpatation.   Cardiovascular:      PMI at left midclavicular line. Normal rate. Regular rhythm. Normal S1. Normal S2.       Murmurs: There is no murmur.      No gallop.  No click. No rub.   Pulses:     Intact distal pulses. No decreased pulses.   Edema:     Peripheral edema absent.   Abdominal:      General: Bowel sounds are normal. There is no distension.      Palpations: Abdomen is soft.      Tenderness: There is no abdominal tenderness.   Musculoskeletal: Normal range of motion.         General: No tenderness or deformity.      Cervical back: Normal range of motion and neck supple. Skin:     General: Skin is warm and dry.      Coloration: Skin is not pale.      Findings: No erythema or rash.   Neurological:      General: No focal deficit present.      Mental Status: Alert, oriented to person, place, and time and oriented to person, place and time.   Psychiatric:         Attention and Perception: Attention and perception normal.         Mood and Affect: Mood and affect normal.         Speech: Speech normal.         Behavior: Behavior normal.         Thought Content: Thought content normal.         Cognition and Memory: Cognition and memory normal.         Judgment: Judgment normal.      Result Review :   The following data was reviewed by: CHAIM Young on 6/13/2023:  Common labs          1/20/2023    11:47 3/21/2023    07:13 3/21/2023    07:44   Common Labs   Glucose   205    BUN   12    Creatinine 0.64   0.86    Sodium   137    Potassium   4.1    Chloride   99    Calcium   8.8    Albumin   4.0    Total Bilirubin   0.5    Alkaline Phosphatase   103    AST (SGOT)   22    ALT (SGPT)   12     WBC  9.77     Hemoglobin  12.7     Hematocrit  39.2     Platelets  233     Data reviewed: Cardiology studies holter monitor 12/11/22, 2d echo 12/17/22, lexiscan 12/16/22 and CTA of the coronary arteries 1/20/23         Assessment and Plan    Diagnoses and all orders for this visit:    1. Chronic diastolic congestive heart failure (HCC) (Primary)-NYHA class II.  Stage C.  Compensated. Reviewed signs and symptoms of CHF and what to report with the patient. Patient instructed to restrict sodium and weigh daily. Report weight gain of greater than 2 lbs overnight or 5 lbs in 1 week. Pt verbalized understanding of instructions and plan of care.  Pt has failed Jardiance in the past due to candidiasis.   Continue spironolactone, carvedilol and Entresto. Consider up titration of Entresto and spironlactone in the future, if tolerated.     2. PAF (paroxysmal atrial fibrillation) ([facility])- in sinus rhythm today.  Anticoagulated.  Stable.  Continue amiodarone.    3. Current use of long term anticoagulation-patient continues on Eliquis.  Denies bleeding.    4. Primary hypertension-blood pressure is well controlled.  Continue Entresto, spironolactone and carvedilol.  Monitor and record daily blood pressure. Report readings consistently higher than 130/80 or consistently lower than 100/60.     5. Mixed hyperlipidemia-management per PCP.  Continue atorvastatin.    6. Uncontrolled type 2 diabetes mellitus with hyperglycemia ([facility])- type 2 diabetes mellitus in the setting of congestive heart failure.  Patient has failed Jardiance in the past due to persistent candidiasis.  Management per PCP.    7. Class 3 severe obesity due to excess calories with serious comorbidity and body mass index (BMI) of 45.0 to 49.9 in adult ([facility])- Class 3 Severe Obesity (BMI >=40). Obesity-related health conditions include the following: obstructive sleep apnea, hypertension, diabetes mellitus, dyslipidemias and GERD. Obesity is unchanged. BMI is is above  average; no BMI management plan is appropriate. We discussed low calorie, low carb based diet program, portion control and increasing exercise.    8. Coronary artery disease of native artery of native heart with stable angina pectoris-patient has been referred to CT surgery at [facility] in [place], [place] for high risk CABG. She is scheduled to see the surgeon tomorrow. Increase Imdur to 120 mg daily.       Follow Up   Return in about 4 weeks (around 7/11/2023) for Next scheduled follow up.  Patient was given instructions and counseling regarding her condition or for health maintenance advice. Please see specific information pulled into the AVS if appropriate.

## 2023-06-15 ENCOUNTER — TELEPHONE (OUTPATIENT)
Dept: GASTROENTEROLOGY | Facility: CLINIC | Age: 68
End: 2023-06-15
Payer: MEDICARE

## 2023-06-15 DIAGNOSIS — R13.14 PHARYNGOESOPHAGEAL DYSPHAGIA: Primary | ICD-10-CM

## 2023-06-15 NOTE — TELEPHONE ENCOUNTER
Teodora, please cancel egd. I ordered esophagram , please see if can get that scheduled next week, thank you.     Of note, she is waiting to be scheduled for cardiac stents ( instead of bypass surgery for now per patient report).

## 2023-07-12 ENCOUNTER — TELEPHONE (OUTPATIENT)
Dept: CARDIOLOGY | Facility: CLINIC | Age: 68
End: 2023-07-12

## 2023-07-12 NOTE — TELEPHONE ENCOUNTER
Caller: Aida Benitez    Relationship to patient: Self    Best call back number: [phone]     Patient is needing: PATIENT IS RETURNING A CALL TO BROOKE YIN TO GIVE HER THE NAME AND NUMBER OF THE PROVIDER SHE WILL BE SEEING IN [place]: NAME-  & FAFDQF-[phone].

## 2023-07-14 PROBLEM — I20.0 UNSTABLE ANGINA: Status: ACTIVE | Noted: 2023-07-14

## 2023-07-26 ENCOUNTER — TELEPHONE (OUTPATIENT)
Dept: CARDIOLOGY | Facility: CLINIC | Age: 68
End: 2023-07-26
Payer: MEDICARE

## 2023-07-26 RX ORDER — CARVEDILOL 6.25 MG/1
6.25 TABLET ORAL 2 TIMES DAILY WITH MEALS
Qty: 180 TABLET | Refills: 3 | Status: SHIPPED | OUTPATIENT
Start: 2023-07-26

## 2023-07-26 NOTE — TELEPHONE ENCOUNTER
Caller: KYLEIGH    Relationship: SELF    Best call back number: [phone]    What is the best time to reach you: ANY    Who are you requesting to speak with (clinical staff, provider,  specific staff member): ANY    Do you know the name of the person who called:     What was the call regarding: PATIENT CALLING IN DUE TO LOW BLOOD PRESSURE. TODAY IT /45 P56 - PLEASE ADVISE IF THERE IS ANYTHING THAT THE PATIENT CAN DO. PATIENT STATES THEY ARE FEELING DIZZY - BENT OVER TO  SOMETHING UP AND ALMOST WENT FELL INTO THE FLOOR. THANK YOU!    CHECKING BP NOW WHILE ON THE PHONE AND IT /47 P58.        Is it okay if the provider responds through [email or web address]: NO     electronic

## 2023-08-16 NOTE — PROGRESS NOTES
Chief Complaint  Congestive Heart Failure (4wk F/U), Coronary Artery Disease (4wk F/U. Started Ranexa LOV), and Hypertension (Decreased Coreg)    Subjective          Aida Benitez presents to [facility] for routine follow-up of medication adjustment.  She was started on Ranexa 500 mg twice daily at her last office visit on 7/11/2023. She has ischemic heart disease, chronic diastolic congestive heart failure, multivessel coronary artery disease, paroxysmal atrial fibrillation on chronic anticoagulation, type 2 diabetes mellitus, hypertension, hyperlipidemia, type 2 diabetes mellitus and morbid obesity. She continues to complain of chronic dyspnea with mild exertion. She reports orthopnea requiring two pillows to sleep. She complains of diaphoresis with exertion. She reports resolution of chest pain since starting Ranexa. Patient denies palpitations, dizziness, syncope, orthopnea, PND, edema or decreased stamina.  Patient denies any signs of bleeding.    Congestive Heart Failure  Presents for follow-up visit. Associated symptoms include orthopnea and shortness of breath. Pertinent negatives include no abdominal pain, chest pain, chest pressure, claudication, edema, fatigue, muscle weakness, near-syncope, nocturia, palpitations, paroxysmal nocturnal dyspnea or unexpected weight change. The symptoms have been stable. Her past medical history is significant for CAD. Compliance with total regimen is 51-75%. Compliance with diet is 51-75%. Compliance with exercise is 51-75%. Compliance with medications is %.   Atrial Fibrillation  Presents for follow-up visit. Symptoms include shortness of breath. Symptoms are negative for an AICD problem, bradycardia, chest pain, dizziness, hemodynamic instability, hypertension, hypotension, pacemaker problem, palpitations, syncope and tachycardia. The symptoms have been stable. Past medical history includes atrial fibrillation, CAD, CHF and  hyperlipidemia.   Hypertension  This is a chronic problem. The current episode started more than 1 year ago. The problem is controlled. Associated symptoms include shortness of breath. Pertinent negatives include no anxiety, blurred vision, chest pain, malaise/fatigue, orthopnea, palpitations, peripheral edema, PND or sweats. Risk factors for coronary artery disease include obesity, post-menopausal state, dyslipidemia and diabetes mellitus. Current antihypertension treatment includes beta blockers and diuretics. The current treatment provides significant improvement. Hypertensive end-organ damage includes heart failure. Identifiable causes of hypertension include sleep apnea.   Hyperlipidemia  This is a chronic problem. The current episode started more than 1 year ago. Exacerbating diseases include diabetes and obesity. Associated symptoms include shortness of breath. Pertinent negatives include no chest pain. Current antihyperlipidemic treatment includes statins. Risk factors for coronary artery disease include hypertension, obesity, post-menopausal, dyslipidemia and diabetes mellitus.   Coronary Artery Disease  Presents for follow-up visit. Symptoms include shortness of breath. Pertinent negatives include no chest pain, chest pressure, chest tightness, dizziness, leg swelling, muscle weakness, palpitations or weight gain. Risk factors include hyperlipidemia and obesity. Risk factors do not include hypertension. Her past medical history is significant for CHF. The symptoms have been stable. Compliance with diet is variable. Compliance with exercise is variable. Compliance with medications is good.       Objective     Current Outpatient Medications:     ALBUTEROL IN, Inhale., Disp: , Rfl:     amiodarone (PACERONE) 100 MG tablet, Take 1 tablet by mouth Daily., Disp: , Rfl:     apixaban (ELIQUIS) 5 MG tablet tablet, Take 1 tablet by mouth 2 (Two) Times a Day., Disp: 60 tablet, Rfl: 11    aspirin 81 MG EC tablet, Take  1 tablet by mouth Daily., Disp: 30 tablet, Rfl: 11    atorvastatin (LIPITOR) 40 MG tablet, Take 2 tablets by mouth Every Night., Disp: , Rfl:     B Complex Vitamins (VITAMIN B-COMPLEX PO), Take  by mouth Daily., Disp: , Rfl:     CALCIUM PO, Take  by mouth Daily., Disp: , Rfl:     carvedilol (COREG) 6.25 MG tablet, Take 1 tablet by mouth 2 (Two) Times a Day With Meals., Disp: 180 tablet, Rfl: 3    Cholecalciferol (VITAMIN D3 PO), Take  by mouth., Disp: , Rfl:     DULoxetine (CYMBALTA) 30 MG capsule, Take 1 capsule by mouth Daily., Disp: , Rfl:     Entresto 24-26 MG tablet, TAKE 1 TABLET BY MOUTH TWICE DAILY, Disp: 60 tablet, Rfl: 11    ezetimibe (ZETIA) 10 MG tablet, Take 1 tablet by mouth Daily., Disp: , Rfl:     ferrous sulfate 325 (65 FE) MG tablet, Take 1 tablet by mouth Daily With Breakfast., Disp: , Rfl:     furosemide (LASIX) 20 MG tablet, Take 1 tablet by mouth Daily., Disp: , Rfl:     gabapentin (NEURONTIN) 300 MG capsule, Take 1 capsule by mouth 3 (Three) Times a Day., Disp: , Rfl:     HYDROcodone-acetaminophen (NORCO)  MG per tablet, Take 1 tablet by mouth Every 6 (Six) Hours As Needed for Moderate Pain., Disp: , Rfl:     isosorbide mononitrate (IMDUR) 120 MG 24 hr tablet, Take 1 tablet by mouth Daily., Disp: 90 tablet, Rfl: 3    Lantus 100 UNIT/ML injection, INJECT 80 UNITS SUBCUTANEOUS TWICE DAILY, Disp: , Rfl:     levothyroxine (SYNTHROID, LEVOTHROID) 88 MCG tablet, Take 1 tablet by mouth Daily., Disp: , Rfl:     metFORMIN (GLUCOPHAGE) 1000 MG tablet, Take 1 tablet by mouth 2 (Two) Times a Day With Meals., Disp: , Rfl:     Mounjaro 7.5 MG/0.5ML solution pen-injector, 1 (One) Time Per Week., Disp: , Rfl:     nitroglycerin (NITROSTAT) 0.3 MG SL tablet, Place 1 tablet under the tongue Every 5 (Five) Minutes As Needed for Chest Pain. Take no more than 3 doses in 15 minutes., Disp: , Rfl:     nystatin (MYCOSTATIN) 033499 UNIT/GM cream, Apply 1 application  topically to the appropriate area as directed  "2 (Two) Times a Day., Disp: , Rfl:     nystatin (MYCOSTATIN) 061868 UNIT/GM powder, Apply  topically to the appropriate area as directed 2 (Two) Times a Day., Disp: , Rfl:     oxybutynin (DITROPAN) 5 MG tablet, Take 1 tablet by mouth 1 (One) Time As Needed., Disp: , Rfl:     pantoprazole (PROTONIX) 40 MG EC tablet, Take 1 tablet by mouth Daily., Disp: , Rfl:     ranolazine (Ranexa) 1000 MG 12 hr tablet, Take 1 tablet by mouth 2 (Two) Times a Day., Disp: 180 tablet, Rfl: 3    rOPINIRole (REQUIP) 0.5 MG tablet, Take 1 tablet by mouth Every Night. Take 1 hour before bedtime., Disp: , Rfl:     spironolactone (ALDACTONE) 25 MG tablet, Take 1 tablet by mouth Daily., Disp: 30 tablet, Rfl: 11    vitamin D (ERGOCALCIFEROL) 1.25 MG (86555 UT) capsule capsule, Take 1 capsule by mouth 2 (Two) Times a Week., Disp: , Rfl:   Vital Signs:   /68   Pulse 73   Ht 152.4 cm (60\")   Wt 115 kg (253 lb)   SpO2 98%   BMI 49.41 kg/mý     Vitals and nursing note reviewed.   Constitutional:       General: Not in acute distress.     Appearance: Normal and healthy appearance. Well-developed and not in distress. Morbidly obese. Not diaphoretic.   Eyes:      General: Lids are normal.         Right eye: No discharge.         Left eye: No discharge.      Conjunctiva/sclera: Conjunctivae normal.      Pupils: Pupils are equal, round, and reactive to light.   HENT:      Head: Normocephalic and atraumatic.      Jaw: There is normal jaw occlusion.      Right Ear: External ear normal.      Left Ear: External ear normal.      Nose: Nose normal.   Neck:      Thyroid: No thyromegaly.      Vascular: No carotid bruit, JVD or JVR. JVD normal.      Trachea: Trachea normal. No tracheal deviation.   Pulmonary:      Effort: Pulmonary effort is normal. No respiratory distress.      Breath sounds: Decreased air movement present. Examination of the right-lower field reveals decreased breath sounds. Examination of the left-lower field reveals decreased " breath sounds. Decreased breath sounds present. No wheezing. No rhonchi. No rales.   Chest:      Chest wall: Not tender to palpatation.   Cardiovascular:      PMI at left midclavicular line. Normal rate. Regular rhythm. Normal S1. Normal S2.       Murmurs: There is no murmur.      No gallop.  No click. No rub.   Pulses:     Intact distal pulses. No decreased pulses.   Edema:     Peripheral edema absent.   Abdominal:      General: Bowel sounds are normal. There is no distension.      Palpations: Abdomen is soft.      Tenderness: There is no abdominal tenderness.   Musculoskeletal: Normal range of motion.         General: No tenderness or deformity.      Cervical back: Normal range of motion and neck supple. Skin:     General: Skin is warm and dry.      Coloration: Skin is not pale.      Findings: No erythema or rash.   Neurological:      General: No focal deficit present.      Mental Status: Alert, oriented to person, place, and time and oriented to person, place and time.   Psychiatric:         Attention and Perception: Attention and perception normal.         Mood and Affect: Mood and affect normal.         Speech: Speech normal.         Behavior: Behavior normal.         Thought Content: Thought content normal.         Cognition and Memory: Cognition and memory normal.         Judgment: Judgment normal.      Result Review :   The following data was reviewed by: CHAIM Young on 08/17/2023:  Common labs          1/20/2023    11:47 3/21/2023    07:13 3/21/2023    07:44   Common Labs   Glucose   205    BUN   12    Creatinine 0.64   0.86    Sodium   137    Potassium   4.1    Chloride   99    Calcium   8.8    Albumin   4.0    Total Bilirubin   0.5    Alkaline Phosphatase   103    AST (SGOT)   22    ALT (SGPT)   12    WBC  9.77     Hemoglobin  12.7     Hematocrit  39.2     Platelets  233     Data reviewed: Cardiology studies holter monitor 12/11/22, 2d echo 12/17/22, lexiscan 12/16/22 and CTA of the coronary  arteries 1/20/23         Assessment and Plan    Diagnoses and all orders for this visit:    1. Chronic diastolic congestive heart failure (HCC) (Primary)-NYHA class II.  Stage C.  Compensated. Reviewed signs and symptoms of CHF and what to report with the patient. Patient instructed to restrict sodium and weigh daily. Report weight gain of greater than 2 lbs overnight or 5 lbs in 1 week. Pt verbalized understanding of instructions and plan of care.  Pt has failed Jardiance in the past due to candidiasis.   Continue spironolactone, carvedilol and Entresto. Consider up titration of Entresto and spironlactone in the future, if tolerated.     2. PAF (paroxysmal atrial fibrillation) ([facility])- in sinus rhythm today.  Anticoagulated.  Stable.  Continue amiodarone.    3. Current use of long term anticoagulation-patient continues on Eliquis.  Denies bleeding.    4. Primary hypertension-blood pressure is well controlled.  Continue Entresto, spironolactone and carvedilol.  Monitor and record daily blood pressure. Report readings consistently higher than 130/80 or consistently lower than 100/60.     5. Mixed hyperlipidemia-management per PCP.  Continue atorvastatin.    6. Uncontrolled type 2 diabetes mellitus with hyperglycemia ([facility])- type 2 diabetes mellitus in the setting of congestive heart failure.  Patient has failed Jardiance in the past due to persistent candidiasis.  Management per PCP.    7. Class 3 severe obesity due to excess calories with serious comorbidity and body mass index (BMI) of 45.0 to 49.9 in adult ([facility])- Class 3 Severe Obesity (BMI >=40). Obesity-related health conditions include the following: obstructive sleep apnea, hypertension, diabetes mellitus, dyslipidemias and GERD. Obesity is unchanged. BMI is is above average; no BMI management plan is appropriate. We discussed low calorie, low carb based diet program, portion control and increasing exercise.    8. Coronary artery disease of native artery of  native heart with stable angina pectoris-patient has been referred to CT surgery at [facility] in [place], [place] for high risk CABG. She was evaluated in June, however since that time the practice has transitioned her care to another physician. She has now decided to proceed with staged PCI and is scheduled for left heart catheterization tomorrow. Continue Imdur.  Increase Ranexa to 1000 mg twice daily.        Follow Up   Return in about 4 weeks (around 9/14/2023) for Next scheduled follow up.  Patient was given instructions and counseling regarding her condition or for health maintenance advice. Please see specific information pulled into the AVS if appropriate.

## 2023-08-16 NOTE — H&P (VIEW-ONLY)
Chief Complaint  Congestive Heart Failure (4wk F/U), Coronary Artery Disease (4wk F/U. Started Ranexa LOV), and Hypertension (Decreased Coreg)    Subjective          Aida Benitez presents to [facility] for routine follow-up of medication adjustment.  She was started on Ranexa 500 mg twice daily at her last office visit on 7/11/2023. She has ischemic heart disease, chronic diastolic congestive heart failure, multivessel coronary artery disease, paroxysmal atrial fibrillation on chronic anticoagulation, type 2 diabetes mellitus, hypertension, hyperlipidemia, type 2 diabetes mellitus and morbid obesity. She continues to complain of chronic dyspnea with mild exertion. She reports orthopnea requiring two pillows to sleep. She complains of diaphoresis with exertion. She reports resolution of chest pain since starting Ranexa. Patient denies palpitations, dizziness, syncope, orthopnea, PND, edema or decreased stamina.  Patient denies any signs of bleeding.    Congestive Heart Failure  Presents for follow-up visit. Associated symptoms include orthopnea and shortness of breath. Pertinent negatives include no abdominal pain, chest pain, chest pressure, claudication, edema, fatigue, muscle weakness, near-syncope, nocturia, palpitations, paroxysmal nocturnal dyspnea or unexpected weight change. The symptoms have been stable. Her past medical history is significant for CAD. Compliance with total regimen is 51-75%. Compliance with diet is 51-75%. Compliance with exercise is 51-75%. Compliance with medications is %.   Atrial Fibrillation  Presents for follow-up visit. Symptoms include shortness of breath. Symptoms are negative for an AICD problem, bradycardia, chest pain, dizziness, hemodynamic instability, hypertension, hypotension, pacemaker problem, palpitations, syncope and tachycardia. The symptoms have been stable. Past medical history includes atrial fibrillation, CAD, CHF and  hyperlipidemia.   Hypertension  This is a chronic problem. The current episode started more than 1 year ago. The problem is controlled. Associated symptoms include shortness of breath. Pertinent negatives include no anxiety, blurred vision, chest pain, malaise/fatigue, orthopnea, palpitations, peripheral edema, PND or sweats. Risk factors for coronary artery disease include obesity, post-menopausal state, dyslipidemia and diabetes mellitus. Current antihypertension treatment includes beta blockers and diuretics. The current treatment provides significant improvement. Hypertensive end-organ damage includes heart failure. Identifiable causes of hypertension include sleep apnea.   Hyperlipidemia  This is a chronic problem. The current episode started more than 1 year ago. Exacerbating diseases include diabetes and obesity. Associated symptoms include shortness of breath. Pertinent negatives include no chest pain. Current antihyperlipidemic treatment includes statins. Risk factors for coronary artery disease include hypertension, obesity, post-menopausal, dyslipidemia and diabetes mellitus.   Coronary Artery Disease  Presents for follow-up visit. Symptoms include shortness of breath. Pertinent negatives include no chest pain, chest pressure, chest tightness, dizziness, leg swelling, muscle weakness, palpitations or weight gain. Risk factors include hyperlipidemia and obesity. Risk factors do not include hypertension. Her past medical history is significant for CHF. The symptoms have been stable. Compliance with diet is variable. Compliance with exercise is variable. Compliance with medications is good.       Objective     Current Outpatient Medications:     ALBUTEROL IN, Inhale., Disp: , Rfl:     amiodarone (PACERONE) 100 MG tablet, Take 1 tablet by mouth Daily., Disp: , Rfl:     apixaban (ELIQUIS) 5 MG tablet tablet, Take 1 tablet by mouth 2 (Two) Times a Day., Disp: 60 tablet, Rfl: 11    aspirin 81 MG EC tablet, Take  1 tablet by mouth Daily., Disp: 30 tablet, Rfl: 11    atorvastatin (LIPITOR) 40 MG tablet, Take 2 tablets by mouth Every Night., Disp: , Rfl:     B Complex Vitamins (VITAMIN B-COMPLEX PO), Take  by mouth Daily., Disp: , Rfl:     CALCIUM PO, Take  by mouth Daily., Disp: , Rfl:     carvedilol (COREG) 6.25 MG tablet, Take 1 tablet by mouth 2 (Two) Times a Day With Meals., Disp: 180 tablet, Rfl: 3    Cholecalciferol (VITAMIN D3 PO), Take  by mouth., Disp: , Rfl:     DULoxetine (CYMBALTA) 30 MG capsule, Take 1 capsule by mouth Daily., Disp: , Rfl:     Entresto 24-26 MG tablet, TAKE 1 TABLET BY MOUTH TWICE DAILY, Disp: 60 tablet, Rfl: 11    ezetimibe (ZETIA) 10 MG tablet, Take 1 tablet by mouth Daily., Disp: , Rfl:     ferrous sulfate 325 (65 FE) MG tablet, Take 1 tablet by mouth Daily With Breakfast., Disp: , Rfl:     furosemide (LASIX) 20 MG tablet, Take 1 tablet by mouth Daily., Disp: , Rfl:     gabapentin (NEURONTIN) 300 MG capsule, Take 1 capsule by mouth 3 (Three) Times a Day., Disp: , Rfl:     HYDROcodone-acetaminophen (NORCO)  MG per tablet, Take 1 tablet by mouth Every 6 (Six) Hours As Needed for Moderate Pain., Disp: , Rfl:     isosorbide mononitrate (IMDUR) 120 MG 24 hr tablet, Take 1 tablet by mouth Daily., Disp: 90 tablet, Rfl: 3    Lantus 100 UNIT/ML injection, INJECT 80 UNITS SUBCUTANEOUS TWICE DAILY, Disp: , Rfl:     levothyroxine (SYNTHROID, LEVOTHROID) 88 MCG tablet, Take 1 tablet by mouth Daily., Disp: , Rfl:     metFORMIN (GLUCOPHAGE) 1000 MG tablet, Take 1 tablet by mouth 2 (Two) Times a Day With Meals., Disp: , Rfl:     Mounjaro 7.5 MG/0.5ML solution pen-injector, 1 (One) Time Per Week., Disp: , Rfl:     nitroglycerin (NITROSTAT) 0.3 MG SL tablet, Place 1 tablet under the tongue Every 5 (Five) Minutes As Needed for Chest Pain. Take no more than 3 doses in 15 minutes., Disp: , Rfl:     nystatin (MYCOSTATIN) 184970 UNIT/GM cream, Apply 1 application  topically to the appropriate area as directed  "2 (Two) Times a Day., Disp: , Rfl:     nystatin (MYCOSTATIN) 104630 UNIT/GM powder, Apply  topically to the appropriate area as directed 2 (Two) Times a Day., Disp: , Rfl:     oxybutynin (DITROPAN) 5 MG tablet, Take 1 tablet by mouth 1 (One) Time As Needed., Disp: , Rfl:     pantoprazole (PROTONIX) 40 MG EC tablet, Take 1 tablet by mouth Daily., Disp: , Rfl:     ranolazine (Ranexa) 1000 MG 12 hr tablet, Take 1 tablet by mouth 2 (Two) Times a Day., Disp: 180 tablet, Rfl: 3    rOPINIRole (REQUIP) 0.5 MG tablet, Take 1 tablet by mouth Every Night. Take 1 hour before bedtime., Disp: , Rfl:     spironolactone (ALDACTONE) 25 MG tablet, Take 1 tablet by mouth Daily., Disp: 30 tablet, Rfl: 11    vitamin D (ERGOCALCIFEROL) 1.25 MG (53358 UT) capsule capsule, Take 1 capsule by mouth 2 (Two) Times a Week., Disp: , Rfl:   Vital Signs:   /68   Pulse 73   Ht 152.4 cm (60\")   Wt 115 kg (253 lb)   SpO2 98%   BMI 49.41 kg/mý     Vitals and nursing note reviewed.   Constitutional:       General: Not in acute distress.     Appearance: Normal and healthy appearance. Well-developed and not in distress. Morbidly obese. Not diaphoretic.   Eyes:      General: Lids are normal.         Right eye: No discharge.         Left eye: No discharge.      Conjunctiva/sclera: Conjunctivae normal.      Pupils: Pupils are equal, round, and reactive to light.   HENT:      Head: Normocephalic and atraumatic.      Jaw: There is normal jaw occlusion.      Right Ear: External ear normal.      Left Ear: External ear normal.      Nose: Nose normal.   Neck:      Thyroid: No thyromegaly.      Vascular: No carotid bruit, JVD or JVR. JVD normal.      Trachea: Trachea normal. No tracheal deviation.   Pulmonary:      Effort: Pulmonary effort is normal. No respiratory distress.      Breath sounds: Decreased air movement present. Examination of the right-lower field reveals decreased breath sounds. Examination of the left-lower field reveals decreased " breath sounds. Decreased breath sounds present. No wheezing. No rhonchi. No rales.   Chest:      Chest wall: Not tender to palpatation.   Cardiovascular:      PMI at left midclavicular line. Normal rate. Regular rhythm. Normal S1. Normal S2.       Murmurs: There is no murmur.      No gallop.  No click. No rub.   Pulses:     Intact distal pulses. No decreased pulses.   Edema:     Peripheral edema absent.   Abdominal:      General: Bowel sounds are normal. There is no distension.      Palpations: Abdomen is soft.      Tenderness: There is no abdominal tenderness.   Musculoskeletal: Normal range of motion.         General: No tenderness or deformity.      Cervical back: Normal range of motion and neck supple. Skin:     General: Skin is warm and dry.      Coloration: Skin is not pale.      Findings: No erythema or rash.   Neurological:      General: No focal deficit present.      Mental Status: Alert, oriented to person, place, and time and oriented to person, place and time.   Psychiatric:         Attention and Perception: Attention and perception normal.         Mood and Affect: Mood and affect normal.         Speech: Speech normal.         Behavior: Behavior normal.         Thought Content: Thought content normal.         Cognition and Memory: Cognition and memory normal.         Judgment: Judgment normal.      Result Review :   The following data was reviewed by: CHAIM Young on 08/17/2023:  Common labs          1/20/2023    11:47 3/21/2023    07:13 3/21/2023    07:44   Common Labs   Glucose   205    BUN   12    Creatinine 0.64   0.86    Sodium   137    Potassium   4.1    Chloride   99    Calcium   8.8    Albumin   4.0    Total Bilirubin   0.5    Alkaline Phosphatase   103    AST (SGOT)   22    ALT (SGPT)   12    WBC  9.77     Hemoglobin  12.7     Hematocrit  39.2     Platelets  233     Data reviewed: Cardiology studies holter monitor 12/11/22, 2d echo 12/17/22, lexiscan 12/16/22 and CTA of the coronary  arteries 1/20/23         Assessment and Plan    Diagnoses and all orders for this visit:    1. Chronic diastolic congestive heart failure (HCC) (Primary)-NYHA class II.  Stage C.  Compensated. Reviewed signs and symptoms of CHF and what to report with the patient. Patient instructed to restrict sodium and weigh daily. Report weight gain of greater than 2 lbs overnight or 5 lbs in 1 week. Pt verbalized understanding of instructions and plan of care.  Pt has failed Jardiance in the past due to candidiasis.   Continue spironolactone, carvedilol and Entresto. Consider up titration of Entresto and spironlactone in the future, if tolerated.     2. PAF (paroxysmal atrial fibrillation) ([facility])- in sinus rhythm today.  Anticoagulated.  Stable.  Continue amiodarone.    3. Current use of long term anticoagulation-patient continues on Eliquis.  Denies bleeding.    4. Primary hypertension-blood pressure is well controlled.  Continue Entresto, spironolactone and carvedilol.  Monitor and record daily blood pressure. Report readings consistently higher than 130/80 or consistently lower than 100/60.     5. Mixed hyperlipidemia-management per PCP.  Continue atorvastatin.    6. Uncontrolled type 2 diabetes mellitus with hyperglycemia ([facility])- type 2 diabetes mellitus in the setting of congestive heart failure.  Patient has failed Jardiance in the past due to persistent candidiasis.  Management per PCP.    7. Class 3 severe obesity due to excess calories with serious comorbidity and body mass index (BMI) of 45.0 to 49.9 in adult ([facility])- Class 3 Severe Obesity (BMI >=40). Obesity-related health conditions include the following: obstructive sleep apnea, hypertension, diabetes mellitus, dyslipidemias and GERD. Obesity is unchanged. BMI is is above average; no BMI management plan is appropriate. We discussed low calorie, low carb based diet program, portion control and increasing exercise.    8. Coronary artery disease of native artery of  native heart with stable angina pectoris-patient has been referred to CT surgery at [facility] in [place], [place] for high risk CABG. She was evaluated in June, however since that time the practice has transitioned her care to another physician. She has now decided to proceed with staged PCI and is scheduled for left heart catheterization tomorrow. Continue Imdur.  Increase Ranexa to 1000 mg twice daily.        Follow Up   Return in about 4 weeks (around 9/14/2023) for Next scheduled follow up.  Patient was given instructions and counseling regarding her condition or for health maintenance advice. Please see specific information pulled into the AVS if appropriate.

## 2023-08-17 ENCOUNTER — OFFICE VISIT (OUTPATIENT)
Dept: CARDIOLOGY | Facility: CLINIC | Age: 68
End: 2023-08-17
Payer: MEDICARE

## 2023-08-17 VITALS
DIASTOLIC BLOOD PRESSURE: 68 MMHG | WEIGHT: 253 LBS | HEART RATE: 73 BPM | SYSTOLIC BLOOD PRESSURE: 113 MMHG | HEIGHT: 60 IN | OXYGEN SATURATION: 98 % | BODY MASS INDEX: 49.67 KG/M2

## 2023-08-17 DIAGNOSIS — I48.0 PAF (PAROXYSMAL ATRIAL FIBRILLATION): ICD-10-CM

## 2023-08-17 DIAGNOSIS — I25.118 CORONARY ARTERY DISEASE OF NATIVE ARTERY OF NATIVE HEART WITH STABLE ANGINA PECTORIS: ICD-10-CM

## 2023-08-17 DIAGNOSIS — Z79.01 CURRENT USE OF LONG TERM ANTICOAGULATION: ICD-10-CM

## 2023-08-17 DIAGNOSIS — E11.65 UNCONTROLLED TYPE 2 DIABETES MELLITUS WITH HYPERGLYCEMIA: ICD-10-CM

## 2023-08-17 DIAGNOSIS — E66.01 CLASS 3 SEVERE OBESITY DUE TO EXCESS CALORIES WITH SERIOUS COMORBIDITY AND BODY MASS INDEX (BMI) OF 45.0 TO 49.9 IN ADULT: ICD-10-CM

## 2023-08-17 DIAGNOSIS — E78.2 MIXED HYPERLIPIDEMIA: ICD-10-CM

## 2023-08-17 DIAGNOSIS — I50.32 CHRONIC DIASTOLIC CONGESTIVE HEART FAILURE: Primary | ICD-10-CM

## 2023-08-17 DIAGNOSIS — I10 PRIMARY HYPERTENSION: ICD-10-CM

## 2023-08-17 RX ORDER — RANOLAZINE 1000 MG/1
1000 TABLET, EXTENDED RELEASE ORAL 2 TIMES DAILY
Qty: 180 TABLET | Refills: 3 | Status: SHIPPED | OUTPATIENT
Start: 2023-08-17

## 2023-08-18 ENCOUNTER — HOSPITAL ENCOUNTER (OUTPATIENT)
Facility: HOSPITAL | Age: 68
Discharge: HOME | End: 2023-08-19
Attending: INTERNAL MEDICINE | Admitting: INTERNAL MEDICINE
Payer: MEDICARE

## 2023-08-18 DIAGNOSIS — I20.0 UNSTABLE ANGINA: ICD-10-CM

## 2023-08-18 PROBLEM — I25.10 CAD (CORONARY ARTERY DISEASE): Status: ACTIVE | Noted: 2023-08-18

## 2023-08-18 LAB
ALBUMIN SERPL-MCNC: 3.5 G/DL (ref 3.5–5.2)
ALBUMIN/GLOB SERPL: 1.3 G/DL
ALP SERPL-CCNC: 86 U/L (ref 39–117)
ALT SERPL W P-5'-P-CCNC: 12 U/L (ref 1–33)
ANION GAP SERPL CALCULATED.3IONS-SCNC: 11 MMOL/L (ref 5–15)
AST SERPL-CCNC: 18 U/L (ref 1–32)
BILIRUB SERPL-MCNC: 0.4 MG/DL (ref 0–1.2)
BUN SERPL-MCNC: 11 MG/DL (ref 8–23)
BUN/CREAT SERPL: 15.5 (ref 7–25)
CALCIUM SPEC-SCNC: 8.9 MG/DL (ref 8.6–10.5)
CHLORIDE SERPL-SCNC: 107 MMOL/L (ref 98–107)
CO2 SERPL-SCNC: 25 MMOL/L (ref 22–29)
CREAT BLDA-MCNC: 0.9 MG/DL (ref 0.6–1.3)
CREAT SERPL-MCNC: 0.71 MG/DL (ref 0.57–1)
DEPRECATED RDW RBC AUTO: 46.5 FL (ref 37–54)
EGFRCR SERPLBLD CKD-EPI 2021: 91.6 ML/MIN/1.73
ERYTHROCYTE [DISTWIDTH] IN BLOOD BY AUTOMATED COUNT: 14.1 % (ref 12.3–15.4)
GLOBULIN UR ELPH-MCNC: 2.6 GM/DL
GLUCOSE SERPL-MCNC: 102 MG/DL (ref 65–99)
HCT VFR BLD AUTO: 40.1 % (ref 34–46.6)
HGB BLD-MCNC: 12.7 G/DL (ref 12–15.9)
MCH RBC QN AUTO: 28.6 PG (ref 26.6–33)
MCHC RBC AUTO-ENTMCNC: 31.7 G/DL (ref 31.5–35.7)
MCV RBC AUTO: 90.3 FL (ref 79–97)
PLATELET # BLD AUTO: 254 10*3/MM3 (ref 140–450)
PMV BLD AUTO: 9.7 FL (ref 6–12)
POTASSIUM SERPL-SCNC: 3.9 MMOL/L (ref 3.5–5.2)
PROT SERPL-MCNC: 6.1 G/DL (ref 6–8.5)
RBC # BLD AUTO: 4.44 10*6/MM3 (ref 3.77–5.28)
SODIUM SERPL-SCNC: 143 MMOL/L (ref 136–145)
WBC NRBC COR # BLD: 9.02 10*3/MM3 (ref 3.4–10.8)

## 2023-08-18 PROCEDURE — C1769 GUIDE WIRE: HCPCS | Performed by: INTERNAL MEDICINE

## 2023-08-18 PROCEDURE — 63710000001 FUROSEMIDE 20 MG TABLET: Performed by: INTERNAL MEDICINE

## 2023-08-18 PROCEDURE — 63710000001 GABAPENTIN 300 MG CAPSULE: Performed by: INTERNAL MEDICINE

## 2023-08-18 PROCEDURE — C1725 CATH, TRANSLUMIN NON-LASER: HCPCS | Performed by: INTERNAL MEDICINE

## 2023-08-18 PROCEDURE — 93572 IV DOP VEL&/PRESS C FLO EA: CPT | Performed by: INTERNAL MEDICINE

## 2023-08-18 PROCEDURE — 25010000002 ENOXAPARIN PER 10 MG: Performed by: INTERNAL MEDICINE

## 2023-08-18 PROCEDURE — C1887 CATHETER, GUIDING: HCPCS | Performed by: INTERNAL MEDICINE

## 2023-08-18 PROCEDURE — 93799 UNLISTED CV SVC/PROCEDURE: CPT | Performed by: INTERNAL MEDICINE

## 2023-08-18 PROCEDURE — A9270 NON-COVERED ITEM OR SERVICE: HCPCS | Performed by: INTERNAL MEDICINE

## 2023-08-18 PROCEDURE — C1894 INTRO/SHEATH, NON-LASER: HCPCS | Performed by: INTERNAL MEDICINE

## 2023-08-18 PROCEDURE — 25010000002 HEPARIN (PORCINE) 2000-0.9 UNIT/L-% SOLUTION: Performed by: INTERNAL MEDICINE

## 2023-08-18 PROCEDURE — 25510000001 IOPAMIDOL PER 1 ML: Performed by: INTERNAL MEDICINE

## 2023-08-18 PROCEDURE — 25010000002 MIDAZOLAM PER 1 MG: Performed by: INTERNAL MEDICINE

## 2023-08-18 PROCEDURE — 63710000001 SACUBITRIL-VALSARTAN 24-26 MG TABLET: Performed by: INTERNAL MEDICINE

## 2023-08-18 PROCEDURE — 25010000002 HEPARIN (PORCINE) 1000-0.9 UT/500ML-% SOLUTION: Performed by: INTERNAL MEDICINE

## 2023-08-18 PROCEDURE — 63710000001 RANOLAZINE 500 MG TABLET SUSTAINED-RELEASE 12 HOUR: Performed by: INTERNAL MEDICINE

## 2023-08-18 PROCEDURE — 63710000001 CARVEDILOL 6.25 MG TABLET: Performed by: INTERNAL MEDICINE

## 2023-08-18 PROCEDURE — 80053 COMPREHEN METABOLIC PANEL: CPT | Performed by: NURSE PRACTITIONER

## 2023-08-18 PROCEDURE — 93454 CORONARY ARTERY ANGIO S&I: CPT | Performed by: INTERNAL MEDICINE

## 2023-08-18 PROCEDURE — 25010000002 DIPHENHYDRAMINE PER 50 MG: Performed by: INTERNAL MEDICINE

## 2023-08-18 PROCEDURE — 63710000001 ROPINIROLE 0.25 MG TABLET: Performed by: INTERNAL MEDICINE

## 2023-08-18 PROCEDURE — 82565 ASSAY OF CREATININE: CPT

## 2023-08-18 PROCEDURE — 85027 COMPLETE CBC AUTOMATED: CPT | Performed by: NURSE PRACTITIONER

## 2023-08-18 PROCEDURE — 25010000002 BIVALIRUDIN TRIFLUOROACETATE 250 MG RECONSTITUTED SOLUTION: Performed by: INTERNAL MEDICINE

## 2023-08-18 PROCEDURE — 25010000002 FENTANYL CITRATE (PF) 50 MCG/ML SOLUTION: Performed by: INTERNAL MEDICINE

## 2023-08-18 PROCEDURE — 93571 IV DOP VEL&/PRESS C FLO 1ST: CPT | Performed by: INTERNAL MEDICINE

## 2023-08-18 RX ORDER — GABAPENTIN 300 MG/1
300 CAPSULE ORAL 3 TIMES DAILY
Status: DISCONTINUED | OUTPATIENT
Start: 2023-08-18 | End: 2023-08-19 | Stop reason: HOSPADM

## 2023-08-18 RX ORDER — SODIUM CHLORIDE 9 MG/ML
40 INJECTION, SOLUTION INTRAVENOUS AS NEEDED
Status: DISCONTINUED | OUTPATIENT
Start: 2023-08-18 | End: 2023-08-18 | Stop reason: HOSPADM

## 2023-08-18 RX ORDER — FERROUS SULFATE 325(65) MG
325 TABLET ORAL
Status: DISCONTINUED | OUTPATIENT
Start: 2023-08-19 | End: 2023-08-19 | Stop reason: HOSPADM

## 2023-08-18 RX ORDER — HEPARIN SODIUM 200 [USP'U]/100ML
INJECTION, SOLUTION INTRAVENOUS
Status: DISCONTINUED | OUTPATIENT
Start: 2023-08-18 | End: 2023-08-18 | Stop reason: HOSPADM

## 2023-08-18 RX ORDER — ENOXAPARIN SODIUM 100 MG/ML
40 INJECTION SUBCUTANEOUS EVERY 24 HOURS
Status: DISCONTINUED | OUTPATIENT
Start: 2023-08-18 | End: 2023-08-19 | Stop reason: HOSPADM

## 2023-08-18 RX ORDER — DIPHENHYDRAMINE HCL 25 MG
25 CAPSULE ORAL EVERY 6 HOURS PRN
Status: DISCONTINUED | OUTPATIENT
Start: 2023-08-18 | End: 2023-08-19 | Stop reason: HOSPADM

## 2023-08-18 RX ORDER — SODIUM CHLORIDE 9 MG/ML
1-3 INJECTION, SOLUTION INTRAVENOUS CONTINUOUS
Status: DISCONTINUED | OUTPATIENT
Start: 2023-08-18 | End: 2023-08-19 | Stop reason: HOSPADM

## 2023-08-18 RX ORDER — MAGNESIUM HYDROXIDE 1200 MG/15ML
LIQUID ORAL
Status: DISCONTINUED | OUTPATIENT
Start: 2023-08-18 | End: 2023-08-18 | Stop reason: HOSPADM

## 2023-08-18 RX ORDER — SPIRONOLACTONE 25 MG/1
25 TABLET ORAL DAILY
Status: DISCONTINUED | OUTPATIENT
Start: 2023-08-19 | End: 2023-08-19 | Stop reason: HOSPADM

## 2023-08-18 RX ORDER — SODIUM CHLORIDE 0.9 % (FLUSH) 0.9 %
10 SYRINGE (ML) INJECTION AS NEEDED
Status: DISCONTINUED | OUTPATIENT
Start: 2023-08-18 | End: 2023-08-18 | Stop reason: HOSPADM

## 2023-08-18 RX ORDER — ONDANSETRON 2 MG/ML
4 INJECTION INTRAMUSCULAR; INTRAVENOUS EVERY 6 HOURS PRN
Status: DISCONTINUED | OUTPATIENT
Start: 2023-08-18 | End: 2023-08-19 | Stop reason: HOSPADM

## 2023-08-18 RX ORDER — DIPHENHYDRAMINE HYDROCHLORIDE 50 MG/ML
INJECTION INTRAMUSCULAR; INTRAVENOUS
Status: DISCONTINUED | OUTPATIENT
Start: 2023-08-18 | End: 2023-08-18 | Stop reason: HOSPADM

## 2023-08-18 RX ORDER — ROPINIROLE 0.25 MG/1
0.5 TABLET, FILM COATED ORAL NIGHTLY
Status: DISCONTINUED | OUTPATIENT
Start: 2023-08-18 | End: 2023-08-19 | Stop reason: HOSPADM

## 2023-08-18 RX ORDER — ONDANSETRON 2 MG/ML
4 INJECTION INTRAMUSCULAR; INTRAVENOUS EVERY 6 HOURS PRN
Status: DISCONTINUED | OUTPATIENT
Start: 2023-08-18 | End: 2023-08-18 | Stop reason: HOSPADM

## 2023-08-18 RX ORDER — CARVEDILOL 6.25 MG/1
6.25 TABLET ORAL 2 TIMES DAILY WITH MEALS
Status: DISCONTINUED | OUTPATIENT
Start: 2023-08-18 | End: 2023-08-19 | Stop reason: HOSPADM

## 2023-08-18 RX ORDER — RANOLAZINE 500 MG/1
1000 TABLET, EXTENDED RELEASE ORAL 2 TIMES DAILY
Status: DISCONTINUED | OUTPATIENT
Start: 2023-08-18 | End: 2023-08-19 | Stop reason: HOSPADM

## 2023-08-18 RX ORDER — MIDAZOLAM HYDROCHLORIDE 1 MG/ML
INJECTION INTRAMUSCULAR; INTRAVENOUS
Status: DISCONTINUED | OUTPATIENT
Start: 2023-08-18 | End: 2023-08-18 | Stop reason: HOSPADM

## 2023-08-18 RX ORDER — ASPIRIN 325 MG
325 TABLET ORAL ONCE
Status: DISCONTINUED | OUTPATIENT
Start: 2023-08-18 | End: 2023-08-18 | Stop reason: HOSPADM

## 2023-08-18 RX ORDER — LIDOCAINE HYDROCHLORIDE 20 MG/ML
INJECTION, SOLUTION INFILTRATION; PERINEURAL
Status: DISCONTINUED | OUTPATIENT
Start: 2023-08-18 | End: 2023-08-18 | Stop reason: HOSPADM

## 2023-08-18 RX ORDER — FUROSEMIDE 20 MG/1
20 TABLET ORAL DAILY
Status: DISCONTINUED | OUTPATIENT
Start: 2023-08-18 | End: 2023-08-19 | Stop reason: HOSPADM

## 2023-08-18 RX ORDER — ONDANSETRON 4 MG/1
4 TABLET, FILM COATED ORAL EVERY 6 HOURS PRN
Status: DISCONTINUED | OUTPATIENT
Start: 2023-08-18 | End: 2023-08-19 | Stop reason: HOSPADM

## 2023-08-18 RX ORDER — ASPIRIN 81 MG/1
81 TABLET ORAL DAILY
Status: DISCONTINUED | OUTPATIENT
Start: 2023-08-19 | End: 2023-08-18 | Stop reason: HOSPADM

## 2023-08-18 RX ORDER — HYDROCODONE BITARTRATE AND ACETAMINOPHEN 10; 325 MG/1; MG/1
1 TABLET ORAL EVERY 6 HOURS PRN
Status: DISCONTINUED | OUTPATIENT
Start: 2023-08-18 | End: 2023-08-19 | Stop reason: HOSPADM

## 2023-08-18 RX ORDER — LEVOTHYROXINE SODIUM 88 UG/1
88 TABLET ORAL
Status: DISCONTINUED | OUTPATIENT
Start: 2023-08-19 | End: 2023-08-19 | Stop reason: HOSPADM

## 2023-08-18 RX ORDER — FENTANYL CITRATE 50 UG/ML
INJECTION, SOLUTION INTRAMUSCULAR; INTRAVENOUS
Status: DISCONTINUED | OUTPATIENT
Start: 2023-08-18 | End: 2023-08-18 | Stop reason: HOSPADM

## 2023-08-18 RX ORDER — ATORVASTATIN CALCIUM 40 MG/1
80 TABLET, FILM COATED ORAL NIGHTLY
Status: DISCONTINUED | OUTPATIENT
Start: 2023-08-19 | End: 2023-08-19 | Stop reason: HOSPADM

## 2023-08-18 RX ORDER — TEMAZEPAM 7.5 MG/1
7.5 CAPSULE ORAL NIGHTLY PRN
Status: DISCONTINUED | OUTPATIENT
Start: 2023-08-18 | End: 2023-08-19 | Stop reason: HOSPADM

## 2023-08-18 RX ORDER — CALCIUM CARBONATE 500 MG/1
2 TABLET, CHEWABLE ORAL 2 TIMES DAILY PRN
Status: DISCONTINUED | OUTPATIENT
Start: 2023-08-18 | End: 2023-08-19 | Stop reason: HOSPADM

## 2023-08-18 RX ORDER — ISOSORBIDE MONONITRATE 60 MG/1
120 TABLET, EXTENDED RELEASE ORAL DAILY
Status: DISCONTINUED | OUTPATIENT
Start: 2023-08-19 | End: 2023-08-19 | Stop reason: HOSPADM

## 2023-08-18 RX ORDER — ASPIRIN 81 MG/1
81 TABLET ORAL DAILY
Status: DISCONTINUED | OUTPATIENT
Start: 2023-08-19 | End: 2023-08-19 | Stop reason: HOSPADM

## 2023-08-18 RX ORDER — SODIUM CHLORIDE 9 MG/ML
75 INJECTION, SOLUTION INTRAVENOUS CONTINUOUS
Status: DISPENSED | OUTPATIENT
Start: 2023-08-18 | End: 2023-08-19

## 2023-08-18 RX ORDER — SODIUM CHLORIDE 0.9 % (FLUSH) 0.9 %
10 SYRINGE (ML) INJECTION EVERY 12 HOURS SCHEDULED
Status: DISCONTINUED | OUTPATIENT
Start: 2023-08-18 | End: 2023-08-18 | Stop reason: HOSPADM

## 2023-08-18 RX ORDER — NITROGLYCERIN 0.4 MG/1
0.4 TABLET SUBLINGUAL
Status: DISCONTINUED | OUTPATIENT
Start: 2023-08-18 | End: 2023-08-18 | Stop reason: HOSPADM

## 2023-08-18 RX ORDER — AMIODARONE HYDROCHLORIDE 200 MG/1
100 TABLET ORAL DAILY
Status: DISCONTINUED | OUTPATIENT
Start: 2023-08-19 | End: 2023-08-19 | Stop reason: HOSPADM

## 2023-08-18 RX ORDER — ALPRAZOLAM 0.5 MG/1
0.5 TABLET ORAL 3 TIMES DAILY PRN
Status: DISCONTINUED | OUTPATIENT
Start: 2023-08-18 | End: 2023-08-19 | Stop reason: HOSPADM

## 2023-08-18 RX ORDER — VERAPAMIL HYDROCHLORIDE 2.5 MG/ML
INJECTION, SOLUTION INTRAVENOUS
Status: DISCONTINUED | OUTPATIENT
Start: 2023-08-18 | End: 2023-08-18 | Stop reason: HOSPADM

## 2023-08-18 RX ORDER — DULOXETIN HYDROCHLORIDE 30 MG/1
30 CAPSULE, DELAYED RELEASE ORAL DAILY
Status: DISCONTINUED | OUTPATIENT
Start: 2023-08-19 | End: 2023-08-19 | Stop reason: HOSPADM

## 2023-08-18 RX ORDER — PANTOPRAZOLE SODIUM 40 MG/1
40 TABLET, DELAYED RELEASE ORAL DAILY
Status: DISCONTINUED | OUTPATIENT
Start: 2023-08-19 | End: 2023-08-19 | Stop reason: HOSPADM

## 2023-08-18 RX ORDER — NITROGLYCERIN 0.4 MG/1
0.4 TABLET SUBLINGUAL
Status: DISCONTINUED | OUTPATIENT
Start: 2023-08-18 | End: 2023-08-19 | Stop reason: HOSPADM

## 2023-08-18 RX ORDER — ACETAMINOPHEN 325 MG/1
650 TABLET ORAL EVERY 4 HOURS PRN
Status: DISCONTINUED | OUTPATIENT
Start: 2023-08-18 | End: 2023-08-19 | Stop reason: HOSPADM

## 2023-08-18 RX ADMIN — GABAPENTIN 300 MG: 300 CAPSULE ORAL at 20:34

## 2023-08-18 RX ADMIN — ENOXAPARIN SODIUM 40 MG: 100 INJECTION SUBCUTANEOUS at 20:34

## 2023-08-18 RX ADMIN — FUROSEMIDE 20 MG: 20 TABLET ORAL at 20:34

## 2023-08-18 RX ADMIN — SACUBITRIL AND VALSARTAN 1 TABLET: 24; 26 TABLET, FILM COATED ORAL at 20:34

## 2023-08-18 RX ADMIN — CARVEDILOL 6.25 MG: 6.25 TABLET, FILM COATED ORAL at 20:34

## 2023-08-18 RX ADMIN — ROPINIROLE HYDROCHLORIDE 0.5 MG: 0.25 TABLET, FILM COATED ORAL at 20:34

## 2023-08-18 RX ADMIN — SODIUM CHLORIDE 75 ML/HR: 9 INJECTION, SOLUTION INTRAVENOUS at 20:13

## 2023-08-18 RX ADMIN — RANOLAZINE 1000 MG: 500 TABLET, FILM COATED, EXTENDED RELEASE ORAL at 20:34

## 2023-08-18 NOTE — Clinical Note
A 5 fr sheath was  inserted using micropuncture technique with ultrasound guidance into the right brachial artery. Sheath insertion not delayed.

## 2023-08-18 NOTE — Clinical Note
Hemostasis started on the right radial artery. R-Band was used in achieving hemostasis. Radial compression device applied to vessel. Hemostasis achieved successfully. Closure device additional comment: 13 of air

## 2023-08-18 NOTE — INTERVAL H&P NOTE
H&P reviewed. The patient was examined and there are no changes to the H&P.      Recommend cardiac catheterization, selective coronary angiography, left ventriculography and percutaneous coronary intervention with application of arteriotomy hemostatic closure device.    I discussed cardiac catheterization, the procedure, risks (including bleeding, infection, vascular damage [including minor oozing, bruising, bleeding, and up to and including but not limited to the need for vascular surgery, emergency cardiothoracic surgery, contrast reaction, renal failure, respiratory failure, heart attack, stroke, arrhythmia and even death), benefits, and alternatives and the patient has voiced understanding and is willing to proceed.    Adequate pre-hydration and post cardiac catheterization hydration.  Premedications as required and indicated for cardiac catheterization.    No contraindication to drug eluting stent placement if required  Further recommendations pending results of cardiac catheterization   This is a high risk PCI  Patient and 2 other female family members understand in the room  She has multiple comorbidities  Her risk of complications are multiple times more than what it would be had it not been for morbid obesity, complex coronary anatomy, smaller coronary arteries, tortuosity of coronary arteries, coronary calcification amongst other high risk features  Given morbid obesity with chronic back pain and per discussion with patient and family members will try to perform this through radial approach which can also limited options in terms of placement of Impella device or balloon pump

## 2023-08-19 ENCOUNTER — HOSPITAL ENCOUNTER (EMERGENCY)
Facility: HOSPITAL | Age: 68
Discharge: HOME | End: 2023-08-20
Attending: STUDENT IN AN ORGANIZED HEALTH CARE EDUCATION/TRAINING PROGRAM | Admitting: STUDENT IN AN ORGANIZED HEALTH CARE EDUCATION/TRAINING PROGRAM
Payer: MEDICARE

## 2023-08-19 VITALS
DIASTOLIC BLOOD PRESSURE: 90 MMHG | RESPIRATION RATE: 18 BRPM | OXYGEN SATURATION: 92 % | BODY MASS INDEX: 49.9 KG/M2 | HEART RATE: 69 BPM | WEIGHT: 254.19 LBS | TEMPERATURE: 97.4 F | SYSTOLIC BLOOD PRESSURE: 134 MMHG | HEIGHT: 60 IN

## 2023-08-19 DIAGNOSIS — N30.00 ACUTE CYSTITIS WITHOUT HEMATURIA: ICD-10-CM

## 2023-08-19 DIAGNOSIS — R25.1 SHAKINESS: Primary | ICD-10-CM

## 2023-08-19 DIAGNOSIS — M62.81 MUSCLE WEAKNESS: ICD-10-CM

## 2023-08-19 LAB
ALBUMIN SERPL-MCNC: 3.6 G/DL (ref 3.5–5.2)
ALBUMIN/GLOB SERPL: 1.3 G/DL
ALP SERPL-CCNC: 91 U/L (ref 39–117)
ALT SERPL W P-5'-P-CCNC: 8 U/L (ref 1–33)
ANION GAP SERPL CALCULATED.3IONS-SCNC: 12 MMOL/L (ref 5–15)
ANION GAP SERPL CALCULATED.3IONS-SCNC: 12 MMOL/L (ref 5–15)
AST SERPL-CCNC: 20 U/L (ref 1–32)
BASOPHILS # BLD AUTO: 0.04 10*3/MM3 (ref 0–0.2)
BASOPHILS NFR BLD AUTO: 0.6 % (ref 0–1.5)
BILIRUB SERPL-MCNC: 0.5 MG/DL (ref 0–1.2)
BUN SERPL-MCNC: 12 MG/DL (ref 8–23)
BUN SERPL-MCNC: 16 MG/DL (ref 8–23)
BUN/CREAT SERPL: 14.5 (ref 7–25)
BUN/CREAT SERPL: 15 (ref 7–25)
CALCIUM SPEC-SCNC: 9.2 MG/DL (ref 8.6–10.5)
CALCIUM SPEC-SCNC: 9.2 MG/DL (ref 8.6–10.5)
CHLORIDE SERPL-SCNC: 100 MMOL/L (ref 98–107)
CHLORIDE SERPL-SCNC: 103 MMOL/L (ref 98–107)
CHOLEST SERPL-MCNC: 133 MG/DL (ref 0–200)
CO2 SERPL-SCNC: 25 MMOL/L (ref 22–29)
CO2 SERPL-SCNC: 26 MMOL/L (ref 22–29)
CREAT SERPL-MCNC: 0.83 MG/DL (ref 0.57–1)
CREAT SERPL-MCNC: 1.07 MG/DL (ref 0.57–1)
DEPRECATED RDW RBC AUTO: 45.6 FL (ref 37–54)
DEPRECATED RDW RBC AUTO: 46.2 FL (ref 37–54)
EGFRCR SERPLBLD CKD-EPI 2021: 56 ML/MIN/1.73
EGFRCR SERPLBLD CKD-EPI 2021: 75.9 ML/MIN/1.73
EOSINOPHIL # BLD AUTO: 0.13 10*3/MM3 (ref 0–0.4)
EOSINOPHIL NFR BLD AUTO: 1.9 % (ref 0.3–6.2)
ERYTHROCYTE [DISTWIDTH] IN BLOOD BY AUTOMATED COUNT: 13.8 % (ref 12.3–15.4)
ERYTHROCYTE [DISTWIDTH] IN BLOOD BY AUTOMATED COUNT: 13.9 % (ref 12.3–15.4)
GLOBULIN UR ELPH-MCNC: 2.8 GM/DL
GLUCOSE BLDC GLUCOMTR-MCNC: 162 MG/DL (ref 70–130)
GLUCOSE SERPL-MCNC: 126 MG/DL (ref 65–99)
GLUCOSE SERPL-MCNC: 201 MG/DL (ref 65–99)
HBA1C MFR BLD: 7.7 % (ref 4.8–5.6)
HCT VFR BLD AUTO: 36 % (ref 34–46.6)
HCT VFR BLD AUTO: 37.2 % (ref 34–46.6)
HDLC SERPL-MCNC: 31 MG/DL (ref 40–60)
HGB BLD-MCNC: 11.4 G/DL (ref 12–15.9)
HGB BLD-MCNC: 11.5 G/DL (ref 12–15.9)
IMM GRANULOCYTES # BLD AUTO: 0.03 10*3/MM3 (ref 0–0.05)
IMM GRANULOCYTES NFR BLD AUTO: 0.4 % (ref 0–0.5)
LDLC SERPL CALC-MCNC: 73 MG/DL (ref 0–100)
LDLC/HDLC SERPL: 2.21 {RATIO}
LYMPHOCYTES # BLD AUTO: 3.1 10*3/MM3 (ref 0.7–3.1)
LYMPHOCYTES NFR BLD AUTO: 44.5 % (ref 19.6–45.3)
MAGNESIUM SERPL-MCNC: 1.7 MG/DL (ref 1.6–2.4)
MCH RBC QN AUTO: 28.2 PG (ref 26.6–33)
MCH RBC QN AUTO: 28.6 PG (ref 26.6–33)
MCHC RBC AUTO-ENTMCNC: 30.9 G/DL (ref 31.5–35.7)
MCHC RBC AUTO-ENTMCNC: 31.7 G/DL (ref 31.5–35.7)
MCV RBC AUTO: 90.2 FL (ref 79–97)
MCV RBC AUTO: 91.2 FL (ref 79–97)
MONOCYTES # BLD AUTO: 0.42 10*3/MM3 (ref 0.1–0.9)
MONOCYTES NFR BLD AUTO: 6 % (ref 5–12)
NEUTROPHILS NFR BLD AUTO: 3.24 10*3/MM3 (ref 1.7–7)
NEUTROPHILS NFR BLD AUTO: 46.6 % (ref 42.7–76)
NRBC BLD AUTO-RTO: 0 /100 WBC (ref 0–0.2)
PLATELET # BLD AUTO: 204 10*3/MM3 (ref 140–450)
PLATELET # BLD AUTO: 216 10*3/MM3 (ref 140–450)
PMV BLD AUTO: 9.1 FL (ref 6–12)
PMV BLD AUTO: 9.2 FL (ref 6–12)
POTASSIUM SERPL-SCNC: 3.8 MMOL/L (ref 3.5–5.2)
POTASSIUM SERPL-SCNC: 4.3 MMOL/L (ref 3.5–5.2)
PROT SERPL-MCNC: 6.4 G/DL (ref 6–8.5)
RBC # BLD AUTO: 3.99 10*6/MM3 (ref 3.77–5.28)
RBC # BLD AUTO: 4.08 10*6/MM3 (ref 3.77–5.28)
SODIUM SERPL-SCNC: 137 MMOL/L (ref 136–145)
SODIUM SERPL-SCNC: 141 MMOL/L (ref 136–145)
TRIGL SERPL-MCNC: 167 MG/DL (ref 0–150)
VLDLC SERPL-MCNC: 29 MG/DL (ref 5–40)
WBC NRBC COR # BLD: 6.96 10*3/MM3 (ref 3.4–10.8)
WBC NRBC COR # BLD: 8.3 10*3/MM3 (ref 3.4–10.8)

## 2023-08-19 PROCEDURE — 63710000001 ASPIRIN 81 MG TABLET DELAYED-RELEASE: Performed by: INTERNAL MEDICINE

## 2023-08-19 PROCEDURE — A9270 NON-COVERED ITEM OR SERVICE: HCPCS | Performed by: INTERNAL MEDICINE

## 2023-08-19 PROCEDURE — 63710000001 AMIODARONE 200 MG TABLET: Performed by: INTERNAL MEDICINE

## 2023-08-19 PROCEDURE — 63710000001 CARVEDILOL 6.25 MG TABLET: Performed by: INTERNAL MEDICINE

## 2023-08-19 PROCEDURE — 82948 REAGENT STRIP/BLOOD GLUCOSE: CPT

## 2023-08-19 PROCEDURE — 63710000001 FERROUS SULFATE 325 (65 FE) MG TABLET: Performed by: INTERNAL MEDICINE

## 2023-08-19 PROCEDURE — 63710000001 DULOXETINE 30 MG CAPSULE DELAYED-RELEASE PARTICLES: Performed by: INTERNAL MEDICINE

## 2023-08-19 PROCEDURE — P9612 CATHETERIZE FOR URINE SPEC: HCPCS

## 2023-08-19 PROCEDURE — 63710000001 GABAPENTIN 300 MG CAPSULE: Performed by: INTERNAL MEDICINE

## 2023-08-19 PROCEDURE — 83735 ASSAY OF MAGNESIUM: CPT | Performed by: STUDENT IN AN ORGANIZED HEALTH CARE EDUCATION/TRAINING PROGRAM

## 2023-08-19 PROCEDURE — 85027 COMPLETE CBC AUTOMATED: CPT | Performed by: INTERNAL MEDICINE

## 2023-08-19 PROCEDURE — 80053 COMPREHEN METABOLIC PANEL: CPT | Performed by: INTERNAL MEDICINE

## 2023-08-19 PROCEDURE — 99283 EMERGENCY DEPT VISIT LOW MDM: CPT

## 2023-08-19 PROCEDURE — 63710000001 ISOSORBIDE MONONITRATE 60 MG TABLET SUSTAINED-RELEASE 24 HOUR: Performed by: INTERNAL MEDICINE

## 2023-08-19 PROCEDURE — 83036 HEMOGLOBIN GLYCOSYLATED A1C: CPT | Performed by: INTERNAL MEDICINE

## 2023-08-19 PROCEDURE — 63710000001 LEVOTHYROXINE 88 MCG TABLET: Performed by: INTERNAL MEDICINE

## 2023-08-19 PROCEDURE — 63710000001 FUROSEMIDE 20 MG TABLET: Performed by: INTERNAL MEDICINE

## 2023-08-19 PROCEDURE — 63710000001 SACUBITRIL-VALSARTAN 24-26 MG TABLET: Performed by: INTERNAL MEDICINE

## 2023-08-19 PROCEDURE — 80061 LIPID PANEL: CPT | Performed by: INTERNAL MEDICINE

## 2023-08-19 PROCEDURE — 85025 COMPLETE CBC W/AUTO DIFF WBC: CPT | Performed by: STUDENT IN AN ORGANIZED HEALTH CARE EDUCATION/TRAINING PROGRAM

## 2023-08-19 PROCEDURE — 63710000001 PANTOPRAZOLE 40 MG TABLET DELAYED-RELEASE: Performed by: INTERNAL MEDICINE

## 2023-08-19 PROCEDURE — 63710000001 RANOLAZINE 500 MG TABLET SUSTAINED-RELEASE 12 HOUR: Performed by: INTERNAL MEDICINE

## 2023-08-19 PROCEDURE — 63710000001 SPIRONOLACTONE 25 MG TABLET: Performed by: INTERNAL MEDICINE

## 2023-08-19 RX ORDER — SODIUM CHLORIDE 0.9 % (FLUSH) 0.9 %
10 SYRINGE (ML) INJECTION AS NEEDED
Status: DISCONTINUED | OUTPATIENT
Start: 2023-08-19 | End: 2023-08-20 | Stop reason: HOSPADM

## 2023-08-19 RX ADMIN — GABAPENTIN 300 MG: 300 CAPSULE ORAL at 09:12

## 2023-08-19 RX ADMIN — CARVEDILOL 6.25 MG: 6.25 TABLET, FILM COATED ORAL at 09:14

## 2023-08-19 RX ADMIN — FUROSEMIDE 20 MG: 20 TABLET ORAL at 09:13

## 2023-08-19 RX ADMIN — LEVOTHYROXINE SODIUM 88 MCG: 88 TABLET ORAL at 06:37

## 2023-08-19 RX ADMIN — PANTOPRAZOLE SODIUM 40 MG: 40 TABLET, DELAYED RELEASE ORAL at 09:12

## 2023-08-19 RX ADMIN — FERROUS SULFATE TAB 325 MG (65 MG ELEMENTAL FE) 325 MG: 325 (65 FE) TAB at 09:13

## 2023-08-19 RX ADMIN — SACUBITRIL AND VALSARTAN 1 TABLET: 24; 26 TABLET, FILM COATED ORAL at 09:13

## 2023-08-19 RX ADMIN — DULOXETINE HYDROCHLORIDE 30 MG: 30 CAPSULE, DELAYED RELEASE ORAL at 09:14

## 2023-08-19 RX ADMIN — ASPIRIN 81 MG: 81 TABLET, COATED ORAL at 09:13

## 2023-08-19 RX ADMIN — RANOLAZINE 1000 MG: 500 TABLET, FILM COATED, EXTENDED RELEASE ORAL at 09:13

## 2023-08-19 RX ADMIN — AMIODARONE HYDROCHLORIDE 100 MG: 200 TABLET ORAL at 09:14

## 2023-08-19 RX ADMIN — ISOSORBIDE MONONITRATE 120 MG: 60 TABLET, EXTENDED RELEASE ORAL at 09:13

## 2023-08-19 RX ADMIN — SPIRONOLACTONE 25 MG: 25 TABLET ORAL at 09:13

## 2023-08-19 NOTE — PLAN OF CARE
Goal Outcome Evaluation:  Plan of Care Reviewed With: patient        Progress: improving  Outcome Evaluation: NSR 70-81.  No c/o pain voiced.  R wrist puncture, area soft, good pulses, temp equal to L.  TR band removed, no bleeding noted.  NO falls noted.  IV NS at 75 ml/hr.  Good UOP.  Probable d/c home today.

## 2023-08-19 NOTE — DISCHARGE SUMMARY
[facility] DISCHARGE    Date of Discharge:  8/19/2023    Discharge Diagnosis:     CAD, s/p heart cath  Chronic diastolic CHF  Paroxysmal atrial fibrillation  Chronic anticoagulation  Primary hypertension  Mixed hyperlipidemia  Uncontrolled type 2 diabetes mellitus  Obesity    Hospital Course  Patient is a 68 y.o. female has a known history of CAD.  She was referred to CT surgery at [facility] in [place], [place] for high-risk CABG.  She was evaluated in June.  She decided to proceed with staged PCI with Dr. Denson.  She presented for elective heart catheterization  and was found to have 70% stenosis of small second right posterior lateral artery which will be treated medically.  She also had heavy calcification of mid LAD with abnormal RFR or 0.82.  She tolerated the procedure well without obvious complication.  She will continue with medical management for now.  Dr. Denson will discuss with patient and family at follow up regarding medical therapy versus bringing her back and accessing through right femoral artery possibly using anesthesia for sedation and consideration of rotablation of the mid LAD.  She was kept overnight due to instrumentation of the LAD and hydration.  Today, the patient is doing well and denies any chest pain or shortness of breath.  Right radial pulse is intact.  She is felt to be stable for discharge with close outpatient follow up.      Procedures Performed  Procedure(s):  Percutaneous Coronary Intervention       Consults:   Consults       No orders found for last 30 day(s).            Physical Exam at Discharge    Vital Signs  Temp:  [97.3 øF (36.3 øC)-98.8 øF (37.1 øC)] 97.4 øF (36.3 øC)  Heart Rate:  [63-73] 69  Resp:  [15-21] 18  BP: ()/(53-90) 134/90    Physical Exam:  General Appearance:    Alert, cooperative, in no acute distress   Head:    Normocephalic, without obvious abnormality, atraumatic   Eyes:            Lids and lashes normal, conjunctivae and  sclerae normal, no   icterus, PERRLA, EOMI   Throat:   Oral mucosa pink and moist   Neck:   No adenopathy, supple, trachea midline, no thyromegaly, no   carotid bruit, no JVD   Lungs:     Clear to auscultation bilaterally,respirations regular, even     and unlabored    Heart:    Regular rhythm and normal rate, normal S1 and S2, no            murmur, no gallop, no rub, no click   Chest Wall:    No abnormalities observed   Abdomen:     Normal bowel sounds present in all four quadrants, no       masses, no organomegaly, soft non-tender, non-distended    Extremities:   No edema, no cyanosis, no clubbing   Pulses:   Pulses palpable and equal bilaterally   Skin:   No bleeding, bruising or rash   Psychiatric:   Displays appropriate mood and affect       Discharge Disposition  Home or Self Care    Discharge Medications     Discharge Medications        Continue These Medications        Instructions Start Date   ALBUTEROL IN   Inhalation      amiodarone 100 MG tablet  Commonly known as: PACERONE   100 mg, Oral, Daily      apixaban 5 MG tablet tablet  Commonly known as: ELIQUIS   5 mg, Oral, 2 Times Daily      aspirin 81 MG EC tablet   81 mg, Oral, Daily      atorvastatin 40 MG tablet  Commonly known as: LIPITOR   80 mg, Oral, Nightly      CALCIUM PO   Oral, Daily      carvedilol 6.25 MG tablet  Commonly known as: COREG   6.25 mg, Oral, 2 Times Daily With Meals      DULoxetine 30 MG capsule  Commonly known as: CYMBALTA   30 mg, Oral, Daily      Entresto 24-26 MG tablet  Generic drug: sacubitril-valsartan   TAKE 1 TABLET BY MOUTH TWICE DAILY      ezetimibe 10 MG tablet  Commonly known as: ZETIA   10 mg, Oral, Daily      ferrous sulfate 325 (65 FE) MG tablet   325 mg, Oral, Daily With Breakfast      furosemide 20 MG tablet  Commonly known as: LASIX   20 mg, Oral, Daily      gabapentin 300 MG capsule  Commonly known as: NEURONTIN   300 mg, Oral, 3 Times Daily      HYDROcodone-acetaminophen  MG per tablet  Commonly known as:  NORCO   1 tablet, Oral, Every 6 Hours PRN      isosorbide mononitrate 120 MG 24 hr tablet  Commonly known as: IMDUR   120 mg, Oral, Daily      Lantus 100 UNIT/ML injection  Generic drug: insulin glargine   INJECT 80 UNITS SUBCUTANEOUS TWICE DAILY      levothyroxine 88 MCG tablet  Commonly known as: SYNTHROID, LEVOTHROID   88 mcg, Oral, Daily      metFORMIN 1000 MG tablet  Commonly known as: GLUCOPHAGE   1,000 mg, Oral, 2 Times Daily With Meals      Mounjaro 7.5 MG/0.5ML solution pen-injector  Generic drug: Tirzepatide   1 (One) Time Per Week.      nitroglycerin 0.3 MG SL tablet  Commonly known as: NITROSTAT   0.3 mg, Sublingual, Every 5 Minutes PRN, Take no more than 3 doses in 15 minutes.      nystatin 144539 UNIT/GM powder  Commonly known as: MYCOSTATIN   Topical, 2 Times Daily      nystatin 824518 UNIT/GM cream  Commonly known as: MYCOSTATIN   1 application , Topical, 2 Times Daily      oxybutynin 5 MG tablet  Commonly known as: DITROPAN   5 mg, Oral, Once As Needed      pantoprazole 40 MG EC tablet  Commonly known as: PROTONIX   40 mg, Oral, Daily      ranolazine 1000 MG 12 hr tablet  Commonly known as: Ranexa   1,000 mg, Oral, 2 Times Daily      rOPINIRole 0.5 MG tablet  Commonly known as: REQUIP   0.5 mg, Oral, Nightly, Take 1 hour before bedtime.      spironolactone 25 MG tablet  Commonly known as: ALDACTONE   25 mg, Oral, Daily      VITAMIN B-COMPLEX PO   Oral, Daily      vitamin D 1.25 MG (97358 UT) capsule capsule  Commonly known as: ERGOCALCIFEROL   50,000 Units, Oral, 2 Times Weekly      VITAMIN D3 PO   Oral               Discharge Diet:   Diet Instructions       Diet: Cardiac Diets; Low Sodium (2g); Regular Texture (IDDSI 7); Thin (IDDSI 0)      Discharge Diet: Cardiac Diets    Cardiac Diet: Low Sodium (2g)    Texture: Regular Texture (IDDSI 7)    Fluid Consistency: Thin (IDDSI 0)            Activity at Discharge:   Activity Instructions       Other Activity Instructions      Activity Instructions:      Activity Instructions:No heavy lifting for 5-7 days greater than 10 lbs.  No heavy or strenuous pushing or pulling  No tub baths, hot tubs, swimming pools, or submerging groin or wrist underwater for 1 week.  Wash groin or wrist site daily with antibacterial soap and water.  Rinse and keep clean and dry.  No lotions, powders, or topical ointments to site.  Keep open to air and dry.  Call our office with any concerns or if you notice redness, swelling, drainage, warmth, or pain at the site.            Follow-up Appointments  Future Appointments   Date Time Provider Department Center   9/14/2023  2:30 PM Trudy Pierre APRN [facility]  [facility]   10/2/2023  2:45 PM Ron Judd MD [facility]   11/6/2023 10:30 AM Freddy Cheng APRN [facility]     Additional Instructions for the Follow-ups that You Need to Schedule       Discharge Follow-up with PCP   As directed       Currently Documented PCP:    Chey Catherine APRN    PCP Phone Number:    [phone]     Follow Up Details: 1-2 weeks        Discharge Follow-up with Specified Provider: Dr. Denson or CHAIM 2-4 weeks; 2 Weeks   As directed      To: Dr. Denson or CHAIM 2-4 weeks   Follow Up: 2 Weeks                     Nora Alberto PA-C  08/19/23  12:03 CDT

## 2023-08-20 VITALS
WEIGHT: 253.53 LBS | OXYGEN SATURATION: 98 % | RESPIRATION RATE: 18 BRPM | HEART RATE: 74 BPM | BODY MASS INDEX: 49.77 KG/M2 | SYSTOLIC BLOOD PRESSURE: 107 MMHG | HEIGHT: 60 IN | DIASTOLIC BLOOD PRESSURE: 67 MMHG | TEMPERATURE: 98.3 F

## 2023-08-20 LAB
BACTERIA UR QL AUTO: ABNORMAL /HPF
BILIRUB UR QL STRIP: NEGATIVE
CLARITY UR: CLEAR
COLOR UR: ABNORMAL
GLUCOSE UR STRIP-MCNC: NEGATIVE MG/DL
GRAN CASTS URNS QL MICRO: ABNORMAL /LPF
HGB UR QL STRIP.AUTO: NEGATIVE
HYALINE CASTS UR QL AUTO: ABNORMAL /LPF
KETONES UR QL STRIP: NEGATIVE
LEUKOCYTE ESTERASE UR QL STRIP.AUTO: ABNORMAL
NITRITE UR QL STRIP: NEGATIVE
PH UR STRIP.AUTO: <=5 [PH] (ref 5–8)
PROT UR QL STRIP: ABNORMAL
RBC # UR STRIP: ABNORMAL /HPF
REF LAB TEST METHOD: ABNORMAL
SP GR UR STRIP: 1.02 (ref 1–1.03)
SQUAMOUS #/AREA URNS HPF: ABNORMAL /HPF
UROBILINOGEN UR QL STRIP: ABNORMAL
WBC # UR STRIP: ABNORMAL /HPF

## 2023-08-20 PROCEDURE — 81001 URINALYSIS AUTO W/SCOPE: CPT | Performed by: STUDENT IN AN ORGANIZED HEALTH CARE EDUCATION/TRAINING PROGRAM

## 2023-08-20 PROCEDURE — 87086 URINE CULTURE/COLONY COUNT: CPT | Performed by: STUDENT IN AN ORGANIZED HEALTH CARE EDUCATION/TRAINING PROGRAM

## 2023-08-20 PROCEDURE — P9612 CATHETERIZE FOR URINE SPEC: HCPCS

## 2023-08-20 RX ORDER — NITROFURANTOIN 25; 75 MG/1; MG/1
100 CAPSULE ORAL 2 TIMES DAILY
Qty: 10 CAPSULE | Refills: 0 | Status: SHIPPED | OUTPATIENT
Start: 2023-08-20 | End: 2023-08-25

## 2023-08-20 NOTE — DISCHARGE INSTRUCTIONS
Today you are seen for your symptoms and your exam and your vital signs and your labs are all very reassuring.  As we discussed your urine is likely contaminated but for safest treatment I would like you to take the prescribed antibiotic although I do not think in any way urinary tract infection is causing your symptoms today.  All of your labs are reassuring.  Please make sure you stay well-hydrated eat a good diet I want you to follow-up with your primary care provider discussed physical therapy as this can help with ambulation.  Please do use the walker at all times.  If any of your symptoms worsen please return to the emergency department immediately.

## 2023-08-20 NOTE — ED NOTES
PT AMBULATED WITH 2 STAFF ASSIST AND WALKER.  PT DID NOT TOLERATE WELL AND WAS UNABLE TO FULLY AMBULATE WITHOUT ASSISTANCE. PT PANICS STATING SHE CANNOT STAND UP FULLY AND LEANS OVER THE WALKER ATTEMPTING TO FALL. .

## 2023-08-20 NOTE — ED PROVIDER NOTES
"EMERGENCY DEPARTMENT ATTENDING NOTE    Patient Name: Aida Benitez    Chief Complaint   Patient presents with    Fall       PATIENT PRESENTATION:  Aida Benitez is a very pleasant 68 y.o. female presenting to the emergency department due to feelings of leg weakness and shakiness.    Patient states that she was recently admitted to observation for an outpatient ordered cardiac catheterization reportedly they were unable to do the cardiac catheterization successfully or at least place a stent.  States she went home she is feeling more weak feels like her legs are shaky and unsteady which is ultimately why she called EMS.  She denies any history or loss conscious no neck or back pain no chest pain no abdominal pain denies any pelvic pain denies any leg pain does states it feels like her legs are weak.      PHYSICAL EXAM:   VS: /67 (BP Location: Left arm, Patient Position: Lying)   Pulse 74   Temp 98.3 øF (36.8 øC) (Oral)   Resp 18   Ht 152.4 cm (60\")   Wt 115 kg (253 lb 8.5 oz)   SpO2 98%   BMI 49.51 kg/mý   GENERAL: Well-appearing obese elderly woman sitting up stretcher no acute distress; well-nourished, well-developed, awake, alert, no acute distress, nontoxic appearing, comfortable  EYES: PERRL, sclerae anicteric, extraocular movements grossly intact, symmetric lids  EARS, NOSE, MOUTH, THROAT: atraumatic external nose and ears, moist mucous membranes  NECK: symmetric, trachea midline  RESPIRATORY: unlabored respiratory effort, clear to auscultation bilaterally, good air movement  CARDIOVASCULAR: no murmurs, peripheral pulses 2+ and equal in all extremities  GI: soft, nontender, nondistended  MUSCULOSKELETAL/EXTREMITIES: extremities without obvious deformity  SKIN: Right wrist with ecchymosis consistent with prior catheterization; otherwise skin warm and dry with no obvious rashes  NEUROLOGIC: moving all 4 extremities symmetrically, CN II-XII grossly intact: 5+ strength with plantarflexion and " dorsiflexion bilaterally; equal sensation of the distal hands distal feet; 5+  shank bilaterally: Patient able to ambulate with walker does not appear limited by strength  PSYCHIATRIC: alert, pleasant and cooperative. Appropriate mood and affect.      MEDICAL DECISION MAKING:    Aida Benitez is a 68 y.o. female presenting to the emerged department complaining of muscle shakiness and weakness while walking with no trauma for the no other symptoms following fairly recent cardiac catheterization performed via her wrist.    Differential Diagnosis Considered: Electrolyte derangement including hypokalemia or dehydration in the setting of patient's n.p.o. status    Labs Ordered:  Labs Reviewed   COMPREHENSIVE METABOLIC PANEL - Abnormal; Notable for the following components:       Result Value    Glucose 201 (*)     Creatinine 1.07 (*)     eGFR 56.0 (*)     All other components within normal limits    Narrative:     GFR Normal >60  Chronic Kidney Disease <60  Kidney Failure <15     URINALYSIS W/ CULTURE IF INDICATED - Abnormal; Notable for the following components:    Color, UA Dark Yellow (*)     Protein, UA Trace (*)     Leuk Esterase, UA Small (1+) (*)     All other components within normal limits    Narrative:     In absence of clinical symptoms, the presence of pyuria, bacteria, and/or nitrites on the urinalysis result does not correlate with infection.   CBC WITH AUTO DIFFERENTIAL - Abnormal; Notable for the following components:    Hemoglobin 11.4 (*)     All other components within normal limits   URINALYSIS, MICROSCOPIC ONLY - Abnormal; Notable for the following components:    RBC, UA 3-5 (*)     WBC, UA 6-12 (*)     Bacteria, UA 1+ (*)     Squamous Epithelial Cells, UA 7-12 (*)     All other components within normal limits   MAGNESIUM - Normal   URINE CULTURE   CBC AND DIFFERENTIAL    Narrative:     The following orders were created for panel order CBC & Differential.  Procedure                                Abnormality         Status                     ---------                               -----------         ------                     CBC Auto Differential[[id number]]        Abnormal            Final result                 Please view results for these tests on the individual orders.        Internal chart review:   Past Medical History:   Diagnosis Date    Anxiety     Arrhythmia     Arthritis     Asthma     Atrial fibrillation     Bronchitis, chronic 1969    CHF (congestive heart failure)     Claustrophobia     Clotting disorder     COPD (chronic obstructive pulmonary disease)     Coronary artery disease     Coughing blood     Depression     Diabetes mellitus     Disease of thyroid gland     Food in larynx causing asphyxiation, initial encounter     GERD (gastroesophageal reflux disease)     Hiatal hernia 2014    Hyperlipidemia     Hypertension     Hypothyroidism     Myocardial infarction     Restless leg     Sleep apnea     Unstable angina 7/14/2023    Vitamin B deficiency     Vitamin D deficiency        Past Surgical History:   Procedure Laterality Date    ARTHROSCOPY SHOULDER W/ OPEN ROTATOR CUFF REPAIR Right     CARDIAC CATHETERIZATION N/A 03/21/2023    Procedure: Left Heart Cath;  Surgeon: Landon Denson MD;  Location:  [facility];  Service: Cardiology;  Laterality: N/A;    CATARACT EXTRACTION Bilateral     FOOT NAVICULAR EXCISION OR BONE GRAFT Right     GASTRIC SLEEVE LAPAROSCOPIC      TUBAL ABDOMINAL LIGATION         Allergies   Allergen Reactions    Isosorb Dinitrate-Hydralazine Shortness Of Breath    Niacin Shortness Of Breath and Unknown (See Comments)     Other reaction(s): airway   -    -       Tamiflu [Oseltamivir] Rash       No current facility-administered medications for this encounter.    Current Outpatient Medications:     ALBUTEROL IN, Inhale., Disp: , Rfl:     amiodarone (PACERONE) 100 MG tablet, Take 1 tablet by mouth Daily., Disp: , Rfl:     apixaban (ELIQUIS) 5 MG tablet  tablet, Take 1 tablet by mouth 2 (Two) Times a Day., Disp: 60 tablet, Rfl: 11    aspirin 81 MG EC tablet, Take 1 tablet by mouth Daily., Disp: 30 tablet, Rfl: 11    atorvastatin (LIPITOR) 40 MG tablet, Take 2 tablets by mouth Every Night., Disp: , Rfl:     B Complex Vitamins (VITAMIN B-COMPLEX PO), Take  by mouth Daily., Disp: , Rfl:     CALCIUM PO, Take  by mouth Daily., Disp: , Rfl:     carvedilol (COREG) 6.25 MG tablet, Take 1 tablet by mouth 2 (Two) Times a Day With Meals., Disp: 180 tablet, Rfl: 3    Cholecalciferol (VITAMIN D3 PO), Take  by mouth., Disp: , Rfl:     DULoxetine (CYMBALTA) 30 MG capsule, Take 1 capsule by mouth Daily., Disp: , Rfl:     Entresto 24-26 MG tablet, TAKE 1 TABLET BY MOUTH TWICE DAILY, Disp: 60 tablet, Rfl: 11    ezetimibe (ZETIA) 10 MG tablet, Take 1 tablet by mouth Daily., Disp: , Rfl:     ferrous sulfate 325 (65 FE) MG tablet, Take 1 tablet by mouth Daily With Breakfast., Disp: , Rfl:     furosemide (LASIX) 20 MG tablet, Take 1 tablet by mouth Daily., Disp: , Rfl:     gabapentin (NEURONTIN) 300 MG capsule, Take 1 capsule by mouth 3 (Three) Times a Day., Disp: , Rfl:     HYDROcodone-acetaminophen (NORCO)  MG per tablet, Take 1 tablet by mouth Every 6 (Six) Hours As Needed for Moderate Pain., Disp: , Rfl:     isosorbide mononitrate (IMDUR) 120 MG 24 hr tablet, Take 1 tablet by mouth Daily., Disp: 90 tablet, Rfl: 3    Lantus 100 UNIT/ML injection, INJECT 80 UNITS SUBCUTANEOUS TWICE DAILY, Disp: , Rfl:     levothyroxine (SYNTHROID, LEVOTHROID) 88 MCG tablet, Take 1 tablet by mouth Daily., Disp: , Rfl:     metFORMIN (GLUCOPHAGE) 1000 MG tablet, Take 1 tablet by mouth 2 (Two) Times a Day With Meals., Disp: , Rfl:     Mounjaro 7.5 MG/0.5ML solution pen-injector, 1 (One) Time Per Week., Disp: , Rfl:     nitrofurantoin, macrocrystal-monohydrate, (MACROBID) 100 MG capsule, Take 1 capsule by mouth 2 (Two) Times a Day for 5 days., Disp: 10 capsule, Rfl: 0    nitroglycerin (NITROSTAT) 0.3  "MG SL tablet, Place 1 tablet under the tongue Every 5 (Five) Minutes As Needed for Chest Pain. Take no more than 3 doses in 15 minutes., Disp: , Rfl:     nystatin (MYCOSTATIN) 271005 UNIT/GM cream, Apply 1 application  topically to the appropriate area as directed 2 (Two) Times a Day., Disp: , Rfl:     nystatin (MYCOSTATIN) 446116 UNIT/GM powder, Apply  topically to the appropriate area as directed 2 (Two) Times a Day., Disp: , Rfl:     oxybutynin (DITROPAN) 5 MG tablet, Take 1 tablet by mouth 1 (One) Time As Needed., Disp: , Rfl:     pantoprazole (PROTONIX) 40 MG EC tablet, Take 1 tablet by mouth Daily., Disp: , Rfl:     ranolazine (Ranexa) 1000 MG 12 hr tablet, Take 1 tablet by mouth 2 (Two) Times a Day., Disp: 180 tablet, Rfl: 3    rOPINIRole (REQUIP) 0.5 MG tablet, Take 1 tablet by mouth Every Night. Take 1 hour before bedtime., Disp: , Rfl:     spironolactone (ALDACTONE) 25 MG tablet, Take 1 tablet by mouth Daily., Disp: 30 tablet, Rfl: 11    vitamin D (ERGOCALCIFEROL) 1.25 MG (94509 UT) capsule capsule, Take 1 capsule by mouth 2 (Two) Times a Week., Disp: , Rfl:     External documents reviewed: Patient had cardiac catheterization performed on August 18, 2023 which demonstrated:    \"Conclusion 8/18/2023     No significant disease of left main coronary artery  Left circumflex coronary artery has proximal 50 to 60% stenosis with normal RFR 0.95  Mid left anterior descending coronary artery is heavily calcified with abnormal RFR of 0.88  Advanced coronary wire across this lesion  Could not advance the level to performed PTCA  All hardware removed  Patient had no immediate complication  Anticoagulation with Angiomax bolus infusion  Guide catheter was JL 4  Right coronary artery was engaged with diagnostic 5 French 3 DRC catheter  Proximal and mid right coronary artery has moderate atherosclerotic changes without any obstructive disease  Second right posterior lateral artery overall is a 1.5 mm vessel with ostial " "and proximal 70% stenosis that will be treated medically  Third right posterior lateral artery does not have any significant obstructive disease with ostial 40 to 50% nonobstructive stenosis  Right posterior descending coronary artery does not have any significant disease        Conclusion  70% stenosis of small second right posterior lateral artery to be treated medically  No significant disease of left circumflex coronary artery RFR  Mid left anterior descending coronary artery with heavy coronary calcification with abnormal RFR of 0.82  Unable to advance coronary balloon due to this        Plan     Optimal guideline directed medical therapy  Will discuss patient and with family further regarding medical therapy versus bringing her back and accessing through right femoral artery possibly using anesthesia for sedation and consideration of rotablation of the mid LAD  Due to instrumentation of the LAD will keep overnight to make sure there are no issues  Hydration   Observation  Risk factor modifications\"    My lab interpretation: Normal white blood count.  Chronically decreased hemoglobin.  CMP is unremarkable.  Urinalysis with clear evidence of contamination with 7-12 squamous cells 1+ bacteria and 6-12 whites with only small leukocytes.    Shared decision making: Patient felt like she was unstable on her feet so I had shared decision-making discussion with the patient her exam is reassuring and her work-up was fairly benign I discussed with her that I am happy to admit her if she does not feel safe at home but that admission would be for pursuing nursing placement.  Patient states she would not would definitely not want to go to a nursing home.  Also discussed with her daughter by phone to ensure patient's safety if she did return home.  Daughter felt completely comfortable with her going home she also has a son that cannot take care of the patient.  She was in agreement that patient would not want to pursue " nursing home.  She understood patient's results as well as return precautions to the emergency department.    ED Course and Re-evaluation: 67yo F presenting to the reporting shakiness with feeling like her legs are going to give out.  Her physical exam she has full strength including flexion extension hip flexion and knee flexion of the ankles is unclear exactly what she means by weakness.  Obtain work-up to ensure there is no electrolyte derangement or evidence of dehydration her all of her labs are really benign.  Urinalysis clearly contaminated but felt safest to empirically treat for acute cystitis given 1+ bacteria seen some whites so discharge patient with Macrobid.  Had patient ambulate in the emergency department with my nursing staff as well as with me watching at the bedside.  Patient actually seems more anxious than truly weak she is walking completely fine with her walker but intermittently starts to hunch over and moves her hands off of the center which is contributing to her feeling that she is going to fall.  Had multiple shared decision-making discussion with the patient as well as her daughter and given these discussions as well as patient's well appearance with really no indication for admission she was discharged home with plan to follow-up with her primary care provider as an outpatient with referral to physical therapy or home health and given return precautions the emergency department for worsening symptoms.      ED Diagnosis:  Acute cystitis without hematuria; Shakiness; Muscle weakness    Disposition: to home  Follow up plan: PCP follow up within 2 days, return to ED immediately if symptoms worsen        Signed:  Michael Palacio MD  Emergency Medicine Physician    Please note that portions of this note were completed with a voice recognition program.      Michael Palacio MD  08/20/23 [id number]

## 2023-08-21 LAB — BACTERIA SPEC AEROBE CULT: NORMAL

## 2023-08-28 ENCOUNTER — TELEPHONE (OUTPATIENT)
Dept: CARDIOLOGY | Facility: CLINIC | Age: 68
End: 2023-08-28
Payer: MEDICARE

## 2023-08-28 NOTE — TELEPHONE ENCOUNTER
Caller: Aida Benitez    Relationship: Self    Best call back number:     What medications are you currently taking:   Current Outpatient Medications on File Prior to Visit   Medication Sig Dispense Refill    ALBUTEROL IN Inhale.      amiodarone (PACERONE) 100 MG tablet Take 1 tablet by mouth Daily.      apixaban (ELIQUIS) 5 MG tablet tablet Take 1 tablet by mouth 2 (Two) Times a Day. 60 tablet 11    aspirin 81 MG EC tablet Take 1 tablet by mouth Daily. 30 tablet 11    atorvastatin (LIPITOR) 40 MG tablet Take 2 tablets by mouth Every Night.      B Complex Vitamins (VITAMIN B-COMPLEX PO) Take  by mouth Daily.      CALCIUM PO Take  by mouth Daily.      carvedilol (COREG) 6.25 MG tablet Take 1 tablet by mouth 2 (Two) Times a Day With Meals. 180 tablet 3    Cholecalciferol (VITAMIN D3 PO) Take  by mouth.      DULoxetine (CYMBALTA) 30 MG capsule Take 1 capsule by mouth Daily.      Entresto 24-26 MG tablet TAKE 1 TABLET BY MOUTH TWICE DAILY 60 tablet 11    ezetimibe (ZETIA) 10 MG tablet Take 1 tablet by mouth Daily.      ferrous sulfate 325 (65 FE) MG tablet Take 1 tablet by mouth Daily With Breakfast.      furosemide (LASIX) 20 MG tablet Take 1 tablet by mouth Daily.      gabapentin (NEURONTIN) 300 MG capsule Take 1 capsule by mouth 3 (Three) Times a Day.      HYDROcodone-acetaminophen (NORCO)  MG per tablet Take 1 tablet by mouth Every 6 (Six) Hours As Needed for Moderate Pain.      isosorbide mononitrate (IMDUR) 120 MG 24 hr tablet Take 1 tablet by mouth Daily. 90 tablet 3    Lantus 100 UNIT/ML injection INJECT 80 UNITS SUBCUTANEOUS TWICE DAILY      levothyroxine (SYNTHROID, LEVOTHROID) 88 MCG tablet Take 1 tablet by mouth Daily.      metFORMIN (GLUCOPHAGE) 1000 MG tablet Take 1 tablet by mouth 2 (Two) Times a Day With Meals.      Mounjaro 7.5 MG/0.5ML solution pen-injector 1 (One) Time Per Week.      nitroglycerin (NITROSTAT) 0.3 MG SL tablet Place 1 tablet under the tongue Every 5 (Five) Minutes As  Needed for Chest Pain. Take no more than 3 doses in 15 minutes.      nystatin (MYCOSTATIN) 073849 UNIT/GM cream Apply 1 application  topically to the appropriate area as directed 2 (Two) Times a Day.      nystatin (MYCOSTATIN) 976838 UNIT/GM powder Apply  topically to the appropriate area as directed 2 (Two) Times a Day.      oxybutynin (DITROPAN) 5 MG tablet Take 1 tablet by mouth 1 (One) Time As Needed.      pantoprazole (PROTONIX) 40 MG EC tablet Take 1 tablet by mouth Daily.      ranolazine (Ranexa) 1000 MG 12 hr tablet Take 1 tablet by mouth 2 (Two) Times a Day. 180 tablet 3    rOPINIRole (REQUIP) 0.5 MG tablet Take 1 tablet by mouth Every Night. Take 1 hour before bedtime.      spironolactone (ALDACTONE) 25 MG tablet Take 1 tablet by mouth Daily. 30 tablet 11    vitamin D (ERGOCALCIFEROL) 1.25 MG (13805 UT) capsule capsule Take 1 capsule by mouth 2 (Two) Times a Week.       No current facility-administered medications on file prior to visit.          When did you start taking these medications: 8.17.2023    Which medication are you concerned about: RANEXA 1000 MG    Who prescribed you this medication: CHAIM MASON     What are your concerns: PATIENT IS CONCERNED THAT THIS MEDICATION COULD BE CAUSING HER TREMORS. PATIENT HAS RAN OUT OF THE RANEXA 500 MG AND IS AFRAID TO TAKE THE RANEXA 1000 MG.     How long have you had these concerns: 8.18.23

## 2023-08-28 NOTE — TELEPHONE ENCOUNTER
Notified patient. She will stop the Ranexa for a few days and let us know if that helps decrease/resolve her tremors. She will also let her PCP know about this. Patient voiced appreciation. NASRIN Dickens Shakira N, APRN  You2 hours ago (12:15 PM)       She can try holding the Ranexa to see if tremors improve.      You  Trudy Pierre, APRN2 hours ago (12:04 PM)     TH  Per 8/19 ED note, she was unable to have cath on 8/18 due to extreme shakiness. Please advise on whether she should cut the ranexa 1000mg in half, or discontinue altogether. Arabella Tam MA

## 2023-09-13 NOTE — PROGRESS NOTES
Chief Complaint  Coronary Artery Disease (4wk F/U. S/p cath. Increased Ranexa LOV but was stopped due to tremors) and Congestive Heart Failure    Subjective          Aida Benitez presents to [facility] for routine follow-up of outpatient procedure.  Left heart catheterization on 8/18/2023 revealed 70% stenosis of small second right PDL, which will be treated medically.  She also had heavy calcification of the mid LAD with abnormal RFR of 0.82, however balloon could not be advanced past the lesion. Case discussed with Dr. Denson, and he recommends medical management at this time. She has ischemic heart disease, chronic diastolic congestive heart failure, multivessel coronary artery disease, paroxysmal atrial fibrillation on chronic anticoagulation, type 2 diabetes mellitus, hypertension, hyperlipidemia, type 2 diabetes mellitus and morbid obesity. She continues to complain of chronic dyspnea with mild exertion. She reports orthopnea requiring two pillows to sleep. She complains of diaphoresis with exertion. She reports resolution of chest pain since starting Ranexa. Patient denies palpitations, dizziness, syncope, orthopnea, PND, edema or decreased stamina.  Patient denies any signs of bleeding.    Congestive Heart Failure  Presents for follow-up visit. Associated symptoms include orthopnea and shortness of breath. Pertinent negatives include no abdominal pain, chest pain, chest pressure, claudication, edema, fatigue, muscle weakness, near-syncope, nocturia, palpitations, paroxysmal nocturnal dyspnea or unexpected weight change. The symptoms have been stable. Her past medical history is significant for CAD. Compliance with total regimen is 51-75%. Compliance with diet is 51-75%. Compliance with exercise is 51-75%. Compliance with medications is %.   Atrial Fibrillation  Presents for follow-up visit. Symptoms include shortness of breath. Symptoms are negative for an AICD problem,  bradycardia, chest pain, dizziness, hemodynamic instability, hypertension, hypotension, pacemaker problem, palpitations, syncope and tachycardia. The symptoms have been stable. Past medical history includes atrial fibrillation, CAD, CHF and hyperlipidemia.   Hypertension  This is a chronic problem. The current episode started more than 1 year ago. The problem is controlled. Associated symptoms include shortness of breath. Pertinent negatives include no anxiety, blurred vision, chest pain, malaise/fatigue, orthopnea, palpitations, peripheral edema, PND or sweats. Risk factors for coronary artery disease include obesity, post-menopausal state, dyslipidemia and diabetes mellitus. Current antihypertension treatment includes beta blockers and diuretics. The current treatment provides significant improvement. Hypertensive end-organ damage includes heart failure. Identifiable causes of hypertension include sleep apnea.   Hyperlipidemia  This is a chronic problem. The current episode started more than 1 year ago. Exacerbating diseases include diabetes and obesity. Associated symptoms include shortness of breath. Pertinent negatives include no chest pain. Current antihyperlipidemic treatment includes statins. Risk factors for coronary artery disease include hypertension, obesity, post-menopausal, dyslipidemia and diabetes mellitus.   Coronary Artery Disease  Presents for follow-up visit. Symptoms include shortness of breath. Pertinent negatives include no chest pain, chest pressure, chest tightness, dizziness, leg swelling, muscle weakness, palpitations or weight gain. Risk factors include hyperlipidemia and obesity. Risk factors do not include hypertension. Her past medical history is significant for CHF. The symptoms have been stable. Compliance with diet is variable. Compliance with exercise is variable. Compliance with medications is good.     I have reviewed and confirmed the accuracy of the ROS Trudy Pierre,  APRN        Objective     Current Outpatient Medications:     ALBUTEROL IN, Inhale., Disp: , Rfl:     amiodarone (PACERONE) 100 MG tablet, Take 1 tablet by mouth Daily., Disp: , Rfl:     apixaban (ELIQUIS) 5 MG tablet tablet, Take 1 tablet by mouth 2 (Two) Times a Day., Disp: 60 tablet, Rfl: 11    aspirin 81 MG EC tablet, Take 1 tablet by mouth Daily., Disp: 30 tablet, Rfl: 11    atorvastatin (LIPITOR) 40 MG tablet, Take 2 tablets by mouth Every Night., Disp: , Rfl:     B Complex Vitamins (VITAMIN B-COMPLEX PO), Take  by mouth Daily., Disp: , Rfl:     CALCIUM PO, Take  by mouth Daily., Disp: , Rfl:     carvedilol (COREG) 6.25 MG tablet, Take 1 tablet by mouth 2 (Two) Times a Day With Meals., Disp: 180 tablet, Rfl: 3    Cholecalciferol (VITAMIN D3 PO), Take  by mouth., Disp: , Rfl:     DULoxetine (CYMBALTA) 30 MG capsule, Take 1 capsule by mouth Daily., Disp: , Rfl:     Entresto 24-26 MG tablet, TAKE 1 TABLET BY MOUTH TWICE DAILY, Disp: 60 tablet, Rfl: 11    ezetimibe (ZETIA) 10 MG tablet, Take 1 tablet by mouth Daily., Disp: , Rfl:     ferrous sulfate 325 (65 FE) MG tablet, Take 1 tablet by mouth Daily With Breakfast., Disp: , Rfl:     furosemide (LASIX) 20 MG tablet, Take 1 tablet by mouth Daily., Disp: , Rfl:     gabapentin (NEURONTIN) 300 MG capsule, Take 1 capsule by mouth 3 (Three) Times a Day., Disp: , Rfl:     HYDROcodone-acetaminophen (NORCO)  MG per tablet, Take 1 tablet by mouth Every 6 (Six) Hours As Needed for Moderate Pain., Disp: , Rfl:     isosorbide mononitrate (IMDUR) 120 MG 24 hr tablet, Take 1 tablet by mouth Daily., Disp: 90 tablet, Rfl: 3    Lantus 100 UNIT/ML injection, INJECT 80 UNITS SUBCUTANEOUS TWICE DAILY, Disp: , Rfl:     levothyroxine (SYNTHROID, LEVOTHROID) 88 MCG tablet, Take 1 tablet by mouth Daily., Disp: , Rfl:     metFORMIN (GLUCOPHAGE) 1000 MG tablet, Take 1 tablet by mouth 2 (Two) Times a Day With Meals., Disp: , Rfl:     Mounjaro 7.5 MG/0.5ML solution pen-injector, 1 (One)  "Time Per Week., Disp: , Rfl:     nitroglycerin (NITROSTAT) 0.3 MG SL tablet, Place 1 tablet under the tongue Every 5 (Five) Minutes As Needed for Chest Pain. Take no more than 3 doses in 15 minutes., Disp: , Rfl:     nystatin (MYCOSTATIN) 663655 UNIT/GM cream, Apply 1 application  topically to the appropriate area as directed 2 (Two) Times a Day., Disp: , Rfl:     nystatin (MYCOSTATIN) 057007 UNIT/GM powder, Apply  topically to the appropriate area as directed 2 (Two) Times a Day., Disp: , Rfl:     oxybutynin (DITROPAN) 5 MG tablet, Take 1 tablet by mouth 1 (One) Time As Needed., Disp: , Rfl:     pantoprazole (PROTONIX) 40 MG EC tablet, Take 1 tablet by mouth Daily., Disp: , Rfl:     rOPINIRole (REQUIP) 0.5 MG tablet, Take 1 tablet by mouth Every Night. Take 1 hour before bedtime., Disp: , Rfl:     spironolactone (ALDACTONE) 25 MG tablet, Take 1 tablet by mouth Daily., Disp: 30 tablet, Rfl: 11    vitamin D (ERGOCALCIFEROL) 1.25 MG (56026 UT) capsule capsule, Take 1 capsule by mouth 2 (Two) Times a Week., Disp: , Rfl:     ranolazine (Ranexa) 500 MG 12 hr tablet, Take 1 tablet by mouth 2 (Two) Times a Day., Disp: 60 tablet, Rfl: 11  Vital Signs:   /70   Pulse 81   Ht 152.4 cm (60\")   Wt 115 kg (253 lb)   SpO2 97%   BMI 49.41 kg/m²     Vitals and nursing note reviewed.   Constitutional:       General: Not in acute distress.     Appearance: Normal and healthy appearance. Well-developed and not in distress. Morbidly obese. Not diaphoretic.   Eyes:      General: Lids are normal.         Right eye: No discharge.         Left eye: No discharge.      Conjunctiva/sclera: Conjunctivae normal.      Pupils: Pupils are equal, round, and reactive to light.   HENT:      Head: Normocephalic and atraumatic.      Jaw: There is normal jaw occlusion.      Right Ear: External ear normal.      Left Ear: External ear normal.      Nose: Nose normal.   Neck:      Thyroid: No thyromegaly.      Vascular: No carotid bruit, JVD or " JVR. JVD normal.      Trachea: Trachea normal. No tracheal deviation.   Pulmonary:      Effort: Pulmonary effort is normal. No respiratory distress.      Breath sounds: Decreased air movement present. Examination of the right-lower field reveals decreased breath sounds. Examination of the left-lower field reveals decreased breath sounds. Decreased breath sounds present. No wheezing. No rhonchi. No rales.   Chest:      Chest wall: Not tender to palpatation.   Cardiovascular:      PMI at left midclavicular line. Normal rate. Regular rhythm. Normal S1. Normal S2.       Murmurs: There is no murmur.      No gallop.  No click. No rub.   Pulses:     Intact distal pulses. No decreased pulses.   Edema:     Peripheral edema absent.   Abdominal:      General: Bowel sounds are normal. There is no distension.      Palpations: Abdomen is soft.      Tenderness: There is no abdominal tenderness.   Musculoskeletal: Normal range of motion.         General: No tenderness or deformity.      Cervical back: Normal range of motion and neck supple. Skin:     General: Skin is warm and dry.      Coloration: Skin is not pale.      Findings: No erythema or rash.   Neurological:      General: No focal deficit present.      Mental Status: Alert, oriented to person, place, and time and oriented to person, place and time.   Psychiatric:         Attention and Perception: Attention and perception normal.         Mood and Affect: Mood and affect normal.         Speech: Speech normal.         Behavior: Behavior normal.         Thought Content: Thought content normal.         Cognition and Memory: Cognition and memory normal.         Judgment: Judgment normal.      Result Review :   The following data was reviewed by: CHAIM Young on 09/14/2023:  Common labs          3/21/2023    07:13 3/21/2023    07:44 8/18/2023    13:47 8/18/2023    15:38 8/18/2023    17:05 8/19/2023    05:17 8/19/2023    22:00   Common Labs   Glucose  205    102  126   201    BUN  12    11  12  16    Creatinine  0.86   0.90  0.71  0.83  1.07    Sodium  137    143  141  137    Potassium  4.1    3.9  3.8  4.3    Chloride  99    107  103  100    Calcium  8.8    8.9  9.2  9.2    Albumin  4.0    3.5   3.6    Total Bilirubin  0.5    0.4   0.5    Alkaline Phosphatase  103    86   91    AST (SGOT)  22    18   20    ALT (SGPT)  12    12   8    WBC 9.77   9.02    8.30  6.96    Hemoglobin 12.7   12.7    11.5  11.4    Hematocrit 39.2   40.1    37.2  36.0    Platelets 233   254    216  204    Total Cholesterol      133     Triglycerides      167     HDL Cholesterol      31     LDL Cholesterol       73     Hemoglobin A1C      7.70     Data reviewed: Cardiology studies holter monitor 12/11/22, 2d echo 12/17/22, lexiscan 12/16/22 and CTA of the coronary arteries 1/20/23         Assessment and Plan    Diagnoses and all orders for this visit:    1. Chronic diastolic congestive heart failure (HCC) (Primary)-NYHA class II.  Stage C.  Compensated. Reviewed signs and symptoms of CHF and what to report with the patient. Patient instructed to restrict sodium and weigh daily. Report weight gain of greater than 2 lbs overnight or 5 lbs in 1 week. Pt verbalized understanding of instructions and plan of care.  Pt has failed Jardiance in the past due to candidiasis.   Continue spironolactone, carvedilol and Entresto. Consider up titration of Entresto and spironlactone in the future, if tolerated.     2. PAF (paroxysmal atrial fibrillation) ([facility])- in sinus rhythm today.  Anticoagulated.  Stable.  Continue amiodarone.    3. Current use of long term anticoagulation-patient continues on Eliquis.  Denies bleeding.    4. Primary hypertension-blood pressure is well controlled.  Continue Entresto, spironolactone and carvedilol.  Monitor and record daily blood pressure. Report readings consistently higher than 130/80 or consistently lower than 100/60.     5. Mixed hyperlipidemia-management per PCP.  Continue  atorvastatin.    6. Uncontrolled type 2 diabetes mellitus with hyperglycemia (HCC)- type 2 diabetes mellitus in the setting of congestive heart failure.  Patient has failed Jardiance in the past due to persistent candidiasis.  Management per PCP.    7. Class 3 severe obesity due to excess calories with serious comorbidity and body mass index (BMI) of 45.0 to 49.9 in adult (HCC)- Class 3 Severe Obesity (BMI >=40). Obesity-related health conditions include the following: obstructive sleep apnea, hypertension, diabetes mellitus, dyslipidemias and GERD. Obesity is unchanged. BMI is is above average; no BMI management plan is appropriate. We discussed low calorie, low carb based diet program, portion control and increasing exercise.    8. Coronary artery disease of native artery of native heart with stable angina pectoris-patient has been referred to CT surgery at [facility] in [place], [place] for high risk CABG. She was evaluated in June, however since that time the practice has transitioned her care to another physician. Left heart cath 8/18/23 revealed 70% stenosis of 2nd PDL that will be treated medically. Mid-LAD had heavy calcification with abnormal RFR, however balloon was unable to be inflated in that area. Case discussed with Dr. Denson, and he recommends medical management for now.  Continue Imdur. Resume Ranexa at lower dose of 500 mg twice daily.       Follow Up   Return in about 4 weeks (around 10/12/2023) for Next scheduled follow up.  Patient was given instructions and counseling regarding her condition or for health maintenance advice. Please see specific information pulled into the AVS if appropriate.

## 2023-09-14 ENCOUNTER — OFFICE VISIT (OUTPATIENT)
Dept: CARDIOLOGY | Facility: CLINIC | Age: 68
End: 2023-09-14
Payer: MEDICARE

## 2023-09-14 VITALS
DIASTOLIC BLOOD PRESSURE: 70 MMHG | OXYGEN SATURATION: 97 % | HEART RATE: 81 BPM | SYSTOLIC BLOOD PRESSURE: 117 MMHG | BODY MASS INDEX: 49.67 KG/M2 | WEIGHT: 253 LBS | HEIGHT: 60 IN

## 2023-09-14 DIAGNOSIS — I25.118 CORONARY ARTERY DISEASE OF NATIVE ARTERY OF NATIVE HEART WITH STABLE ANGINA PECTORIS: ICD-10-CM

## 2023-09-14 DIAGNOSIS — E66.01 CLASS 3 SEVERE OBESITY DUE TO EXCESS CALORIES WITH SERIOUS COMORBIDITY AND BODY MASS INDEX (BMI) OF 45.0 TO 49.9 IN ADULT: ICD-10-CM

## 2023-09-14 DIAGNOSIS — I50.32 CHRONIC DIASTOLIC CONGESTIVE HEART FAILURE: Primary | ICD-10-CM

## 2023-09-14 DIAGNOSIS — E11.65 UNCONTROLLED TYPE 2 DIABETES MELLITUS WITH HYPERGLYCEMIA: ICD-10-CM

## 2023-09-14 DIAGNOSIS — E78.2 MIXED HYPERLIPIDEMIA: ICD-10-CM

## 2023-09-14 DIAGNOSIS — I48.0 PAF (PAROXYSMAL ATRIAL FIBRILLATION): ICD-10-CM

## 2023-09-14 DIAGNOSIS — Z79.01 CURRENT USE OF LONG TERM ANTICOAGULATION: ICD-10-CM

## 2023-09-14 DIAGNOSIS — I10 PRIMARY HYPERTENSION: ICD-10-CM

## 2023-09-14 RX ORDER — RANOLAZINE 500 MG/1
500 TABLET, EXTENDED RELEASE ORAL 2 TIMES DAILY
Qty: 60 TABLET | Refills: 11 | Status: SHIPPED | OUTPATIENT
Start: 2023-09-14

## 2023-10-02 ENCOUNTER — OFFICE VISIT (OUTPATIENT)
Dept: GASTROENTEROLOGY | Facility: CLINIC | Age: 68
End: 2023-10-02
Payer: MEDICARE

## 2023-10-02 VITALS
WEIGHT: 252 LBS | SYSTOLIC BLOOD PRESSURE: 138 MMHG | TEMPERATURE: 95.9 F | DIASTOLIC BLOOD PRESSURE: 70 MMHG | HEIGHT: 60 IN | OXYGEN SATURATION: 97 % | BODY MASS INDEX: 49.48 KG/M2 | HEART RATE: 88 BPM

## 2023-10-02 DIAGNOSIS — K22.5 ZENKER'S DIVERTICULUM: ICD-10-CM

## 2023-10-02 DIAGNOSIS — R13.19 OTHER DYSPHAGIA: Primary | ICD-10-CM

## 2023-10-02 PROCEDURE — 3075F SYST BP GE 130 - 139MM HG: CPT | Performed by: INTERNAL MEDICINE

## 2023-10-02 PROCEDURE — 1159F MED LIST DOCD IN RCRD: CPT | Performed by: INTERNAL MEDICINE

## 2023-10-02 PROCEDURE — 1160F RVW MEDS BY RX/DR IN RCRD: CPT | Performed by: INTERNAL MEDICINE

## 2023-10-02 PROCEDURE — 99213 OFFICE O/P EST LOW 20 MIN: CPT | Performed by: INTERNAL MEDICINE

## 2023-10-02 PROCEDURE — 3078F DIAST BP <80 MM HG: CPT | Performed by: INTERNAL MEDICINE

## 2023-10-02 NOTE — PROGRESS NOTES
[facility]    Chief Complaint   Patient presents with    Follow-up     Post endoscopy       Subjective     HPI    Aida Benitez is a 68 y.o. female who presents with a chief complaint of dysphagia    She saw Alma in May for intermittent dysphagia.  See copy of HPI below.  This was not new.  She had this complaint in 2022.  Alma was setting her up for endoscopy.  The patient and up having to go to [place] for evaluation of coronary bypass grafting with CT surgery.  The endoscopy was put on hold.  I see she was hospitalized here locally after heart catheterization.  She had significant disease but it was elected to treat medically.  Per Dr. Denson note, he was concerned and follow-up heart catheterization with possible stenting.  I see she was in the emergency room a few weeks ago complaining of weakness and shakiness.    Alma had ordered an esophagram which noted a large Zenker's diverticulum and presbyesophagus.  See copy of impression below    She comes in today feeling well.  She states she has dysphagia.  She points to the left side of her neck where she feels food hanging up.  It does not occur with every swallow or meal.  She notes that she will spit food back up that she ate hours previously or even the day previously.  She will spit it back up into her mouth.  She denies any coughing or choking.  She has no history of aspirating.  Hamburger tends to be the worse. If she grounds her meat up it tends to go down better.  Her weight is stable.    She states she currently is on medical therapy for her coronary artery disease.  They are hoping she develops recanalization.  They do not have any plans for follow-up catheterization.        Esophagram IMPRESSION:  1. There is a moderate-sized Zenker's diverticulum centered at the C6-7  level, measuring approximately 1.5 cm. Once filled with contrast, this  persists on all images.  2. Mild presbyesophagus.  3. Small sliding-type hiatal hernia.  "No reflux is seen during the  examination.  This report was finalized on 07/05/2023 10:19 by Dr. Chad Segovia MD.  ============ copy of May 2, 2023 HPI by Alma==============  HISTORY OF PRESENT ILLNESS:      Aida Benitez is a 67 y.o. female presents  with dysphagia.               Dysphagia: Patient complains of difficulty swallowing. This started 1 year ago.  The patient points to the neck area where this occurs. This is noted with solid foods and pills. Has had to regurgitate to get relief. Taking protonix daily with good control. No n/v. No reflux symptoms. No hematemesis.            She has history of gastric sleeve 2014.   EGD and colonoscopy 8/2019 by Dr. Edson Blood.         She is on eliquis. Dr. Denson.         =======================================================================  Office visit  10-25-22 HPI  Aida BENITEZ is a 67 y.o. female presents with dysphagia. This started 6 mo ago. She points to the neck area where meds and solid foods will hang. Taking a drink does not help. Has had to cough to get relief.  Takes protonix daily that does control reflux. No weight loss. No hematemesis.   No fever.      She is on eliquis. Prescribed by Chey Catherine NP.     She has history gastric sleeve 2014.         Last egd was around 3 year ago done in [place]. That was \" ok\" then.   Last colonoscopy 3 year ago in [place] and was \" ok \" then. She has had colon polyps in the past. Recommended repeat colonoscopy 10 year.   No family history of colon cancer.   Father had colon polyps.            Past Medical History:   Diagnosis Date    Anxiety     Arrhythmia     Arthritis     Asthma     Atrial fibrillation     Bronchitis, chronic 1969    CHF (congestive heart failure)     Claustrophobia     Clotting disorder     COPD (chronic obstructive pulmonary disease)     Coronary artery disease     Coughing blood     Depression     Diabetes mellitus     Disease of thyroid gland     Food in larynx causing " asphyxiation, initial encounter     GERD (gastroesophageal reflux disease)     Hiatal hernia 2014    Hyperlipidemia     Hypertension     Hypothyroidism     Myocardial infarction     Restless leg     Sleep apnea     Unstable angina 7/14/2023    Vitamin B deficiency     Vitamin D deficiency        Past Surgical History:   Procedure Laterality Date    ARTHROSCOPY SHOULDER W/ OPEN ROTATOR CUFF REPAIR Right     CARDIAC CATHETERIZATION N/A 03/21/2023    Procedure: Left Heart Cath;  Surgeon: Landon Denson MD;  Location:  [facility];  Service: Cardiology;  Laterality: N/A;    CARDIAC CATHETERIZATION N/A 8/18/2023    Procedure: Percutaneous Coronary Intervention;  Surgeon: Landon Denson MD;  Location:  [facility];  Service: Cardiology;  Laterality: N/A;    CATARACT EXTRACTION Bilateral     FOOT NAVICULAR EXCISION OR BONE GRAFT Right     GASTRIC SLEEVE LAPAROSCOPIC      TUBAL ABDOMINAL LIGATION           Current Outpatient Medications:     ALBUTEROL IN, Inhale., Disp: , Rfl:     amiodarone (PACERONE) 100 MG tablet, Take 1 tablet by mouth Daily., Disp: , Rfl:     apixaban (ELIQUIS) 5 MG tablet tablet, Take 1 tablet by mouth 2 (Two) Times a Day., Disp: 60 tablet, Rfl: 11    aspirin 81 MG EC tablet, Take 1 tablet by mouth Daily., Disp: 30 tablet, Rfl: 11    atorvastatin (LIPITOR) 40 MG tablet, Take 2 tablets by mouth Every Night., Disp: , Rfl:     B Complex Vitamins (VITAMIN B-COMPLEX PO), Take  by mouth Daily., Disp: , Rfl:     CALCIUM PO, Take  by mouth Daily., Disp: , Rfl:     carvedilol (COREG) 6.25 MG tablet, Take 1 tablet by mouth 2 (Two) Times a Day With Meals., Disp: 180 tablet, Rfl: 3    Cholecalciferol (VITAMIN D3 PO), Take  by mouth., Disp: , Rfl:     DULoxetine (CYMBALTA) 30 MG capsule, Take 1 capsule by mouth Daily., Disp: , Rfl:     Entresto 24-26 MG tablet, TAKE 1 TABLET BY MOUTH TWICE DAILY, Disp: 60 tablet, Rfl: 11    ezetimibe (ZETIA) 10 MG tablet, Take 1 tablet by mouth  Daily., Disp: , Rfl:     ferrous sulfate 325 (65 FE) MG tablet, Take 1 tablet by mouth Daily With Breakfast., Disp: , Rfl:     furosemide (LASIX) 20 MG tablet, Take 1 tablet by mouth Daily., Disp: , Rfl:     gabapentin (NEURONTIN) 300 MG capsule, Take 1 capsule by mouth 3 (Three) Times a Day., Disp: , Rfl:     HYDROcodone-acetaminophen (NORCO)  MG per tablet, Take 1 tablet by mouth Every 6 (Six) Hours As Needed for Moderate Pain., Disp: , Rfl:     isosorbide mononitrate (IMDUR) 120 MG 24 hr tablet, Take 1 tablet by mouth Daily., Disp: 90 tablet, Rfl: 3    Lantus 100 UNIT/ML injection, INJECT 80 UNITS SUBCUTANEOUS TWICE DAILY, Disp: , Rfl:     levothyroxine (SYNTHROID, LEVOTHROID) 88 MCG tablet, Take 1 tablet by mouth Daily., Disp: , Rfl:     metFORMIN (GLUCOPHAGE) 1000 MG tablet, Take 1 tablet by mouth 2 (Two) Times a Day With Meals., Disp: , Rfl:     Mounjaro 7.5 MG/0.5ML solution pen-injector, 1 (One) Time Per Week., Disp: , Rfl:     nitroglycerin (NITROSTAT) 0.3 MG SL tablet, Place 1 tablet under the tongue Every 5 (Five) Minutes As Needed for Chest Pain. Take no more than 3 doses in 15 minutes., Disp: , Rfl:     nystatin (MYCOSTATIN) 430207 UNIT/GM cream, Apply 1 application  topically to the appropriate area as directed 2 (Two) Times a Day., Disp: , Rfl:     nystatin (MYCOSTATIN) 188208 UNIT/GM powder, Apply  topically to the appropriate area as directed 2 (Two) Times a Day., Disp: , Rfl:     oxybutynin (DITROPAN) 5 MG tablet, Take 1 tablet by mouth 1 (One) Time As Needed., Disp: , Rfl:     pantoprazole (PROTONIX) 40 MG EC tablet, Take 1 tablet by mouth Daily., Disp: , Rfl:     ranolazine (Ranexa) 500 MG 12 hr tablet, Take 1 tablet by mouth 2 (Two) Times a Day., Disp: 60 tablet, Rfl: 11    rOPINIRole (REQUIP) 0.5 MG tablet, Take 1 tablet by mouth Every Night. Take 1 hour before bedtime., Disp: , Rfl:     spironolactone (ALDACTONE) 25 MG tablet, Take 1 tablet by mouth Daily., Disp: 30 tablet, Rfl: 11     vitamin D (ERGOCALCIFEROL) 1.25 MG (37217 UT) capsule capsule, Take 1 capsule by mouth 2 (Two) Times a Week., Disp: , Rfl:     Allergies   Allergen Reactions    Isosorb Dinitrate-Hydralazine Shortness Of Breath    Niacin Shortness Of Breath and Unknown (See Comments)     Other reaction(s): airway   -    -       Tamiflu [Oseltamivir] Rash       Social History     Socioeconomic History    Marital status:    Tobacco Use    Smoking status: Never    Smokeless tobacco: Never   Vaping Use    Vaping Use: Never used   Substance and Sexual Activity    Alcohol use: Never    Drug use: Never    Sexual activity: Not Currently     Partners: Male     Birth control/protection: None       Family History   Problem Relation Age of Onset    Hypertension Mother     Heart disease Mother     Arrhythmia Mother     Asthma Mother     Cancer Mother     Thyroid disease Mother     Stroke Mother     Hypertension Father     Heart disease Father     Colon polyps Father     Prostate cancer Father     Cancer Father     Heart failure Father     Hypertension Sister     Heart attack Sister     Atrial fibrillation Sister     COPD Sister     Cancer Brother     Cancer Sister     Cancer Brother     Cancer Brother     Cancer Sister     Colon cancer Neg Hx        Review of Systems  No abdominal pain, no heartbur    Objective     Vitals:    10/02/23 1403   BP: 138/70   Pulse: 88   Temp: 95.9 °F (35.5 °C)   SpO2: 97%       Physical Exam  Vitals reviewed.   Constitutional:       Appearance: Normal appearance. She is obese. She is not ill-appearing or diaphoretic.   Pulmonary:      Effort: Pulmonary effort is normal.   Neurological:      Mental Status: She is alert.   Psychiatric:         Thought Content: Thought content normal.         Judgment: Judgment normal.             Assessment & Plan   Problem List Items Addressed This Visit          Gastrointestinal Abdominal     Other dysphagia - Primary    Zenker's diverticulum         She describes dysphagia  symptoms compatible with a Zenker's diverticulum.  I had a long discussion with the patient and her family.  I drew a picture for them explaining what a Zenker's diverticulum is.  We discussed that this is not something that can be amended by endoscopic therapy.  Typically, treatment for symptomatic Zenker's diverticulum, is elective surgery by ENT.  I would be happy to refer her to ENT for further evaluation.  Obviously, with her underlying significant coronary artery disease that would be something to consider with any elective surgery.  She expressed understand this.  She states she fully understands that cardiology would have to be involved.  After long conversation her thoughts, she at this time does not wish any additional intervention concerning the risks involved.  She states she is going continue to cut and chew food well and grind her food up.  If she has worsening troubles or concerns, then she will consider going to see ENT.  I think this is reasonable.  She is going continue to follow with her primary care.  She will come see us as needed.    Continue ongoing management by primary care provider and other specialists.           EMR Dragon/transcription disclaimer:  Much of this encounter note is electronic transcription/translation of spoken language to printed text.  The electronic translation of spoken language may be erroneous, or at times, nonsensical words or phrases may be inadvertently transcribed.  Although I have reviewed the note for such errors, some may still exist.    Ron Judd MD  16:29 CDT  10/02/23

## 2023-10-04 ENCOUNTER — TELEPHONE (OUTPATIENT)
Dept: CARDIOLOGY | Facility: CLINIC | Age: 68
End: 2023-10-04
Payer: MEDICARE

## 2023-10-04 RX ORDER — RANOLAZINE 500 MG/1
500 GRANULE ORAL 2 TIMES DAILY
Qty: 60 EACH | Refills: 11 | Status: SHIPPED | OUTPATIENT
Start: 2023-10-04

## 2023-10-04 NOTE — TELEPHONE ENCOUNTER
Patient called stating that she's unable to swallow her Ranexa tablet, her pharmacist recommended that we send Aspruzyo sprinkle 1000 mg daily instead if possible.    Please advise.    Thank you  WF

## 2023-10-04 NOTE — TELEPHONE ENCOUNTER
OKAY FOR HUB TO READ:    I attempted to reach patient to let her know that her medication has been sent to pharmacy as requested however patient didn't answer and her voicemail is full.

## 2023-10-11 ENCOUNTER — TELEPHONE (OUTPATIENT)
Dept: CARDIOLOGY | Facility: CLINIC | Age: 68
End: 2023-10-11
Payer: MEDICARE

## 2023-10-11 NOTE — TELEPHONE ENCOUNTER
Caller: Aida Benitez    Relationship: Self    Best call back number: [phone]    What medications are you currently taking:   Current Outpatient Medications on File Prior to Visit   Medication Sig Dispense Refill    ALBUTEROL IN Inhale.      amiodarone (PACERONE) 100 MG tablet Take 1 tablet by mouth Daily.      apixaban (ELIQUIS) 5 MG tablet tablet Take 1 tablet by mouth 2 (Two) Times a Day. 60 tablet 11    aspirin 81 MG EC tablet Take 1 tablet by mouth Daily. 30 tablet 11    atorvastatin (LIPITOR) 40 MG tablet Take 2 tablets by mouth Every Night.      B Complex Vitamins (VITAMIN B-COMPLEX PO) Take  by mouth Daily.      CALCIUM PO Take  by mouth Daily.      carvedilol (COREG) 6.25 MG tablet Take 1 tablet by mouth 2 (Two) Times a Day With Meals. 180 tablet 3    Cholecalciferol (VITAMIN D3 PO) Take  by mouth.      DULoxetine (CYMBALTA) 30 MG capsule Take 1 capsule by mouth Daily.      Entresto 24-26 MG tablet TAKE 1 TABLET BY MOUTH TWICE DAILY 60 tablet 11    ezetimibe (ZETIA) 10 MG tablet Take 1 tablet by mouth Daily.      ferrous sulfate 325 (65 FE) MG tablet Take 1 tablet by mouth Daily With Breakfast.      furosemide (LASIX) 20 MG tablet Take 1 tablet by mouth Daily.      gabapentin (NEURONTIN) 300 MG capsule Take 1 capsule by mouth 3 (Three) Times a Day.      HYDROcodone-acetaminophen (NORCO)  MG per tablet Take 1 tablet by mouth Every 6 (Six) Hours As Needed for Moderate Pain.      isosorbide mononitrate (IMDUR) 120 MG 24 hr tablet Take 1 tablet by mouth Daily. 90 tablet 3    Lantus 100 UNIT/ML injection INJECT 80 UNITS SUBCUTANEOUS TWICE DAILY      levothyroxine (SYNTHROID, LEVOTHROID) 88 MCG tablet Take 1 tablet by mouth Daily.      metFORMIN (GLUCOPHAGE) 1000 MG tablet Take 1 tablet by mouth 2 (Two) Times a Day With Meals.      Mounjaro 7.5 MG/0.5ML solution pen-injector 1 (One) Time Per Week.      nitroglycerin (NITROSTAT) 0.3 MG SL tablet Place 1 tablet under the tongue Every 5 (Five) Minutes  As Needed for Chest Pain. Take no more than 3 doses in 15 minutes.      nystatin (MYCOSTATIN) 565191 UNIT/GM cream Apply 1 application  topically to the appropriate area as directed 2 (Two) Times a Day.      nystatin (MYCOSTATIN) 926544 UNIT/GM powder Apply  topically to the appropriate area as directed 2 (Two) Times a Day.      oxybutynin (DITROPAN) 5 MG tablet Take 1 tablet by mouth 1 (One) Time As Needed.      pantoprazole (PROTONIX) 40 MG EC tablet Take 1 tablet by mouth Daily.      Ranolazine ER (Aspruzyo Sprinkle) 500 MG pack Take 500 mg by mouth 2 (Two) Times a Day. 60 each 11    rOPINIRole (REQUIP) 0.5 MG tablet Take 1 tablet by mouth Every Night. Take 1 hour before bedtime.      spironolactone (ALDACTONE) 25 MG tablet Take 1 tablet by mouth Daily. 30 tablet 11    vitamin D (ERGOCALCIFEROL) 1.25 MG (99821 UT) capsule capsule Take 1 capsule by mouth 2 (Two) Times a Week.       No current facility-administered medications on file prior to visit.          When did you start taking these medications: NA    Which medication are you concerned about: RANOLAZINE     What are your concerns: PHARMACY DOES NOT HAVE MEDICATION RANOLAZINE IN CAPSULE FORM. THEY ARE SUGGESTING REPLACING IT FOR PLACE [id number] WHICH CAPSULE FORM THAT CAN BE TAKEN APART AND PUT ON FOOD. PLEASE REACH OUT TO PATIENT FOR DISCUSSION.     How long have you had these concerns:  PATIENT HAS BEEN WITHOUT MEDICATION FOR A MONTH AND A HALF.

## 2023-10-11 NOTE — TELEPHONE ENCOUNTER
Patient called.Notified that her Aspruzyo request was sent to [facility] on 10/4/23.Pharmacy added to patient profile.

## 2023-10-16 RX ORDER — SACUBITRIL AND VALSARTAN 24; 26 MG/1; MG/1
1 TABLET, FILM COATED ORAL 2 TIMES DAILY
Qty: 60 TABLET | Refills: 11 | Status: SHIPPED | OUTPATIENT
Start: 2023-10-16

## 2023-10-16 RX ORDER — SACUBITRIL AND VALSARTAN 24; 26 MG/1; MG/1
1 TABLET, FILM COATED ORAL 2 TIMES DAILY
OUTPATIENT
Start: 2023-10-16

## 2023-10-16 NOTE — TELEPHONE ENCOUNTER
PT CALLED TO LET US KNOW THAT SHE IS OUT OF ENTRESTO. PER REVIEW OF CHART, RX REQUEST WAS REFUSED...BUT IM NOT SURE WHY. LOV STATES THAT PT IS CONTINUE.    PLEASE SIGN IF APPROPRIATE

## 2023-10-31 ENCOUNTER — TELEPHONE (OUTPATIENT)
Dept: CARDIOLOGY | Facility: CLINIC | Age: 68
End: 2023-10-31

## 2023-10-31 NOTE — TELEPHONE ENCOUNTER
Caller: KYLEIGH ROSS    Relationship: SELF    Best call back number: [phone]    Which medication are you concerned about: PT UNSURE OF MEDICATION NAME BUT SHE THINKS IT IS FOR ANGINA         COMMENTS:PHARMACY IS UNABLE TO FILL PRESCRIPTION BECAUSE THEY ARE OUT OF STOCK. SO WANTS TO KNOW IF A DIFFERENT MEDICATION CAN BE SENT IN TO THE PHARMACY.

## 2023-11-02 ENCOUNTER — OFFICE VISIT (OUTPATIENT)
Dept: CARDIOLOGY | Facility: CLINIC | Age: 68
End: 2023-11-02
Payer: MEDICARE

## 2023-11-02 VITALS
WEIGHT: 246 LBS | OXYGEN SATURATION: 93 % | HEART RATE: 83 BPM | HEIGHT: 60 IN | BODY MASS INDEX: 48.29 KG/M2 | SYSTOLIC BLOOD PRESSURE: 115 MMHG | DIASTOLIC BLOOD PRESSURE: 78 MMHG

## 2023-11-02 DIAGNOSIS — E11.65 UNCONTROLLED TYPE 2 DIABETES MELLITUS WITH HYPERGLYCEMIA: ICD-10-CM

## 2023-11-02 DIAGNOSIS — E66.01 CLASS 3 SEVERE OBESITY DUE TO EXCESS CALORIES WITH SERIOUS COMORBIDITY AND BODY MASS INDEX (BMI) OF 45.0 TO 49.9 IN ADULT: ICD-10-CM

## 2023-11-02 DIAGNOSIS — I50.32 CHRONIC DIASTOLIC CONGESTIVE HEART FAILURE: Primary | ICD-10-CM

## 2023-11-02 DIAGNOSIS — E78.2 MIXED HYPERLIPIDEMIA: ICD-10-CM

## 2023-11-02 DIAGNOSIS — I10 PRIMARY HYPERTENSION: ICD-10-CM

## 2023-11-02 DIAGNOSIS — Z79.01 CURRENT USE OF LONG TERM ANTICOAGULATION: ICD-10-CM

## 2023-11-02 DIAGNOSIS — I25.118 CORONARY ARTERY DISEASE OF NATIVE ARTERY OF NATIVE HEART WITH STABLE ANGINA PECTORIS: ICD-10-CM

## 2023-11-02 DIAGNOSIS — I48.0 PAF (PAROXYSMAL ATRIAL FIBRILLATION): ICD-10-CM

## 2023-11-02 RX ORDER — RANOLAZINE 500 MG/1
1000 GRANULE ORAL 2 TIMES DAILY
Qty: 360 EACH | Refills: 3 | Status: SHIPPED | OUTPATIENT
Start: 2023-11-02

## 2023-11-02 NOTE — PROGRESS NOTES
Chief Complaint  Coronary Artery Disease (6wk F/U. Decreased Ranexa LOV) and Congestive Heart Failure    Subjective          Aida Benitez presents to [facility] for routine follow-up of medication adjustment.  Ranexa was resumed at lower dose of 500 mg twice daily at her last office visit on 9/14/2023.  She has chronic diastolic congestive heart failure, multivessel coronary artery disease, paroxysmal atrial fibrillation on chronic anticoagulation, type 2 diabetes mellitus, hypertension, hyperlipidemia, type 2 diabetes mellitus and morbid obesity. She continues to complain of chronic dyspnea with mild exertion. She reports orthopnea requiring two pillows to sleep. She complains of diaphoresis with exertion. She reports decrease in frequency of chest pain while on  Ranexa, however she has been out of the Ranexa for over a month due to pharmacy supply. She reports return of chest pain while off of Ranexa.  Patient denies palpitations, dizziness, syncope, orthopnea, PND, edema or decreased stamina.  Patient denies any signs of bleeding.    Congestive Heart Failure  Presents for follow-up visit. Associated symptoms include chest pain, orthopnea and shortness of breath. Pertinent negatives include no abdominal pain, chest pressure, claudication, edema, fatigue, muscle weakness, near-syncope, nocturia, palpitations, paroxysmal nocturnal dyspnea or unexpected weight change. The symptoms have been stable. Her past medical history is significant for CAD. Compliance with total regimen is 51-75%. Compliance with diet is 51-75%. Compliance with exercise is 51-75%. Compliance with medications is %.   Atrial Fibrillation  Presents for follow-up visit. Symptoms include chest pain and shortness of breath. Symptoms are negative for an AICD problem, bradycardia, dizziness, hemodynamic instability, hypertension, hypotension, pacemaker problem, palpitations, syncope and tachycardia. The symptoms  have been stable. Past medical history includes atrial fibrillation, CAD, CHF and hyperlipidemia.   Hypertension  This is a chronic problem. The current episode started more than 1 year ago. The problem is controlled. Associated symptoms include chest pain and shortness of breath. Pertinent negatives include no anxiety, blurred vision, malaise/fatigue, orthopnea, palpitations, peripheral edema, PND or sweats. Risk factors for coronary artery disease include obesity, post-menopausal state, dyslipidemia and diabetes mellitus. Current antihypertension treatment includes beta blockers and diuretics. The current treatment provides significant improvement. Hypertensive end-organ damage includes heart failure. Identifiable causes of hypertension include sleep apnea.   Hyperlipidemia  This is a chronic problem. The current episode started more than 1 year ago. Exacerbating diseases include diabetes and obesity. Associated symptoms include chest pain and shortness of breath. Current antihyperlipidemic treatment includes statins. Risk factors for coronary artery disease include hypertension, obesity, post-menopausal, dyslipidemia and diabetes mellitus.   Coronary Artery Disease  Presents for follow-up visit. Symptoms include chest pain and shortness of breath. Pertinent negatives include no chest pressure, chest tightness, dizziness, leg swelling, muscle weakness, palpitations or weight gain. Risk factors include hyperlipidemia and obesity. Risk factors do not include hypertension. Her past medical history is significant for CHF. The symptoms have been stable. Compliance with diet is variable. Compliance with exercise is variable. Compliance with medications is good.     I have reviewed and confirmed the accuracy of the ROS  HCAIM Young            Objective     Current Outpatient Medications:     ALBUTEROL IN, Inhale., Disp: , Rfl:     amiodarone (PACERONE) 100 MG tablet, Take 1 tablet by mouth Daily., Disp: ,  Rfl:     apixaban (ELIQUIS) 5 MG tablet tablet, Take 1 tablet by mouth 2 (Two) Times a Day., Disp: 60 tablet, Rfl: 11    aspirin 81 MG EC tablet, Take 1 tablet by mouth Daily., Disp: 30 tablet, Rfl: 11    atorvastatin (LIPITOR) 40 MG tablet, Take 2 tablets by mouth Every Night., Disp: , Rfl:     B Complex Vitamins (VITAMIN B-COMPLEX PO), Take  by mouth Daily., Disp: , Rfl:     CALCIUM PO, Take  by mouth Daily., Disp: , Rfl:     carvedilol (COREG) 6.25 MG tablet, Take 1 tablet by mouth 2 (Two) Times a Day With Meals., Disp: 180 tablet, Rfl: 3    Cholecalciferol (VITAMIN D3 PO), Take  by mouth., Disp: , Rfl:     DULoxetine (CYMBALTA) 30 MG capsule, Take 1 capsule by mouth Daily., Disp: , Rfl:     ezetimibe (ZETIA) 10 MG tablet, Take 1 tablet by mouth Daily., Disp: , Rfl:     ferrous sulfate 325 (65 FE) MG tablet, Take 1 tablet by mouth Daily With Breakfast., Disp: , Rfl:     furosemide (LASIX) 20 MG tablet, Take 1 tablet by mouth Daily., Disp: , Rfl:     gabapentin (NEURONTIN) 300 MG capsule, Take 1 capsule by mouth 3 (Three) Times a Day., Disp: , Rfl:     HYDROcodone-acetaminophen (NORCO)  MG per tablet, Take 1 tablet by mouth Every 6 (Six) Hours As Needed for Moderate Pain., Disp: , Rfl:     isosorbide mononitrate (IMDUR) 120 MG 24 hr tablet, Take 1 tablet by mouth Daily., Disp: 90 tablet, Rfl: 3    Lantus 100 UNIT/ML injection, INJECT 80 UNITS SUBCUTANEOUS TWICE DAILY, Disp: , Rfl:     levothyroxine (SYNTHROID, LEVOTHROID) 88 MCG tablet, Take 1 tablet by mouth Daily., Disp: , Rfl:     metFORMIN (GLUCOPHAGE) 1000 MG tablet, Take 1 tablet by mouth 2 (Two) Times a Day With Meals., Disp: , Rfl:     Mounjaro 7.5 MG/0.5ML solution pen-injector, 1 (One) Time Per Week., Disp: , Rfl:     nitroglycerin (NITROSTAT) 0.3 MG SL tablet, Place 1 tablet under the tongue Every 5 (Five) Minutes As Needed for Chest Pain. Take no more than 3 doses in 15 minutes., Disp: , Rfl:     nystatin (MYCOSTATIN) 794318 UNIT/GM cream, Apply  "1 application  topically to the appropriate area as directed 2 (Two) Times a Day., Disp: , Rfl:     nystatin (MYCOSTATIN) 915374 UNIT/GM powder, Apply  topically to the appropriate area as directed 2 (Two) Times a Day., Disp: , Rfl:     oxybutynin (DITROPAN) 5 MG tablet, Take 1 tablet by mouth 1 (One) Time As Needed., Disp: , Rfl:     pantoprazole (PROTONIX) 40 MG EC tablet, Take 1 tablet by mouth Daily., Disp: , Rfl:     Ranolazine ER (Aspruzyo Sprinkle) 500 MG pack, Take 1,000 mg by mouth 2 (Two) Times a Day., Disp: 360 each, Rfl: 3    rOPINIRole (REQUIP) 0.5 MG tablet, Take 1 tablet by mouth Every Night. Take 1 hour before bedtime., Disp: , Rfl:     sacubitril-valsartan (Entresto) 24-26 MG tablet, Take 1 tablet by mouth 2 (Two) Times a Day., Disp: 60 tablet, Rfl: 11    spironolactone (ALDACTONE) 25 MG tablet, Take 1 tablet by mouth Daily., Disp: 30 tablet, Rfl: 11    vitamin D (ERGOCALCIFEROL) 1.25 MG (37623 UT) capsule capsule, Take 1 capsule by mouth 2 (Two) Times a Week., Disp: , Rfl:   Vital Signs:   /78   Pulse 83   Ht 152.4 cm (60\")   Wt 112 kg (246 lb)   SpO2 93%   BMI 48.04 kg/m²     Vitals and nursing note reviewed.   Constitutional:       General: Not in acute distress.     Appearance: Normal and healthy appearance. Well-developed and not in distress. Morbidly obese. Not diaphoretic.   Eyes:      General: Lids are normal.         Right eye: No discharge.         Left eye: No discharge.      Conjunctiva/sclera: Conjunctivae normal.      Pupils: Pupils are equal, round, and reactive to light.   HENT:      Head: Normocephalic and atraumatic.      Jaw: There is normal jaw occlusion.      Right Ear: External ear normal.      Left Ear: External ear normal.      Nose: Nose normal.   Neck:      Thyroid: No thyromegaly.      Vascular: No carotid bruit, JVD or JVR. JVD normal.      Trachea: Trachea normal. No tracheal deviation.   Pulmonary:      Effort: Pulmonary effort is normal. No respiratory " distress.      Breath sounds: Decreased air movement present. Examination of the right-lower field reveals decreased breath sounds. Examination of the left-lower field reveals decreased breath sounds. Decreased breath sounds present. No wheezing. No rhonchi. No rales.   Chest:      Chest wall: Not tender to palpatation.   Cardiovascular:      PMI at left midclavicular line. Normal rate. Regular rhythm. Normal S1. Normal S2.       Murmurs: There is no murmur.      No gallop.  No click. No rub.   Pulses:     Intact distal pulses. No decreased pulses.   Edema:     Peripheral edema absent.   Abdominal:      General: Bowel sounds are normal. There is no distension.      Palpations: Abdomen is soft.      Tenderness: There is no abdominal tenderness.   Musculoskeletal: Normal range of motion.         General: No tenderness or deformity.      Cervical back: Normal range of motion and neck supple. Skin:     General: Skin is warm and dry.      Coloration: Skin is not pale.      Findings: No erythema or rash.   Neurological:      General: No focal deficit present.      Mental Status: Alert, oriented to person, place, and time and oriented to person, place and time.   Psychiatric:         Attention and Perception: Attention and perception normal.         Mood and Affect: Mood and affect normal.         Speech: Speech normal.         Behavior: Behavior normal.         Thought Content: Thought content normal.         Cognition and Memory: Cognition and memory normal.         Judgment: Judgment normal.        Result Review :   The following data was reviewed by: CHAIM Young on 11/02/2023:  Common labs          3/21/2023    07:13 3/21/2023    07:44 8/18/2023    13:47 8/18/2023    15:38 8/18/2023    17:05 8/19/2023    05:17 8/19/2023    22:00   Common Labs   Glucose  205    102  126  201    BUN  12    11  12  16    Creatinine  0.86   0.90  0.71  0.83  1.07    Sodium  137    143  141  137    Potassium  4.1    3.9  3.8   4.3    Chloride  99    107  103  100    Calcium  8.8    8.9  9.2  9.2    Albumin  4.0    3.5   3.6    Total Bilirubin  0.5    0.4   0.5    Alkaline Phosphatase  103    86   91    AST (SGOT)  22    18   20    ALT (SGPT)  12    12   8    WBC 9.77   9.02    8.30  6.96    Hemoglobin 12.7   12.7    11.5  11.4    Hematocrit 39.2   40.1    37.2  36.0    Platelets 233   254    216  204    Total Cholesterol      133     Triglycerides      167     HDL Cholesterol      31     LDL Cholesterol       73     Hemoglobin A1C      7.70     Data reviewed: Cardiology studies holter monitor 12/11/22, 2d echo 12/17/22, lexiscan 12/16/22 and CTA of the coronary arteries 1/20/23    ECG 12 Lead    Date/Time: 11/2/2023 2:19 PM  Performed by: Trudy Pierre APRN    Authorized by: Trudy Pierre APRN  Comparison: compared with previous ECG from 5/16/2023  Similar to previous ECG  Rhythm: sinus rhythm  Rate: normal  BPM: 83  QRS axis: normal    Clinical impression: abnormal EKG            Assessment and Plan    Diagnoses and all orders for this visit:    1. Chronic diastolic congestive heart failure (HCC) (Primary)-NYHA class II.  Stage C.  Compensated. Reviewed signs and symptoms of CHF and what to report with the patient. Patient instructed to restrict sodium and weigh daily. Report weight gain of greater than 2 lbs overnight or 5 lbs in 1 week. Pt verbalized understanding of instructions and plan of care.  Pt has failed Jardiance in the past due to candidiasis.   Continue spironolactone, carvedilol and Entresto. Consider up titration of Entresto and spironlactone in the future, if tolerated.     2. PAF (paroxysmal atrial fibrillation) (HCC)- in sinus rhythm today.  Anticoagulated.  Stable.  Continue amiodarone.    3. Current use of long term anticoagulation-patient continues on Eliquis.  Denies bleeding.    4. Primary hypertension-blood pressure is well controlled.  Continue Entresto, spironolactone and carvedilol.  Monitor and  record daily blood pressure. Report readings consistently higher than 130/80 or consistently lower than 100/60.     5. Mixed hyperlipidemia-management per PCP.  Continue atorvastatin.    6. Uncontrolled type 2 diabetes mellitus with hyperglycemia (HCC)- type 2 diabetes mellitus in the setting of congestive heart failure.  Patient has failed Jardiance in the past due to persistent candidiasis.  Management per PCP.    7. Class 3 severe obesity due to excess calories with serious comorbidity and body mass index (BMI) of 45.0 to 49.9 in adult (HCC)- Class 3 Severe Obesity (BMI >=40). Obesity-related health conditions include the following: obstructive sleep apnea, hypertension, diabetes mellitus, dyslipidemias and GERD. Obesity is unchanged. BMI is is above average; no BMI management plan is appropriate. We discussed low calorie, low carb based diet program, portion control and increasing exercise.    8. Coronary artery disease of native artery of native heart with stable angina pectoris-patient has been referred to CT surgery at [facility] in [place], [place] for high risk CABG. She was evaluated in June, however since that time the practice has transitioned her care to another physician. Left heart cath 8/18/23 revealed 70% stenosis of 2nd PDL that will be treated medically. Mid-LAD had heavy calcification with abnormal RFR, however balloon was unable to be inflated in that area. Case discussed with Dr. Denson, and he recommends medical management for now.  Continue Imdur. Increase Ranexa to 1000 mg twice daily.     Follow Up   Return in about 4 weeks (around 11/30/2023) for Next scheduled follow up.  Patient was given instructions and counseling regarding her condition or for health maintenance advice. Please see specific information pulled into the AVS if appropriate.

## 2023-12-06 ENCOUNTER — OFFICE VISIT (OUTPATIENT)
Dept: CARDIOLOGY | Facility: CLINIC | Age: 68
End: 2023-12-06
Payer: MEDICARE

## 2023-12-06 VITALS
WEIGHT: 246 LBS | SYSTOLIC BLOOD PRESSURE: 108 MMHG | DIASTOLIC BLOOD PRESSURE: 66 MMHG | HEIGHT: 60 IN | OXYGEN SATURATION: 95 % | HEART RATE: 90 BPM | BODY MASS INDEX: 48.29 KG/M2

## 2023-12-06 DIAGNOSIS — Z79.01 CURRENT USE OF LONG TERM ANTICOAGULATION: ICD-10-CM

## 2023-12-06 DIAGNOSIS — E66.01 CLASS 3 SEVERE OBESITY DUE TO EXCESS CALORIES WITH SERIOUS COMORBIDITY AND BODY MASS INDEX (BMI) OF 45.0 TO 49.9 IN ADULT: ICD-10-CM

## 2023-12-06 DIAGNOSIS — I48.0 PAF (PAROXYSMAL ATRIAL FIBRILLATION): ICD-10-CM

## 2023-12-06 DIAGNOSIS — I25.118 CORONARY ARTERY DISEASE OF NATIVE ARTERY OF NATIVE HEART WITH STABLE ANGINA PECTORIS: ICD-10-CM

## 2023-12-06 DIAGNOSIS — E11.65 UNCONTROLLED TYPE 2 DIABETES MELLITUS WITH HYPERGLYCEMIA: ICD-10-CM

## 2023-12-06 DIAGNOSIS — I10 PRIMARY HYPERTENSION: ICD-10-CM

## 2023-12-06 DIAGNOSIS — I50.32 CHRONIC DIASTOLIC CONGESTIVE HEART FAILURE: Primary | ICD-10-CM

## 2023-12-06 DIAGNOSIS — E78.2 MIXED HYPERLIPIDEMIA: ICD-10-CM

## 2023-12-06 RX ORDER — RANOLAZINE 500 MG/1
1000 GRANULE ORAL 2 TIMES DAILY
Qty: 360 EACH | Refills: 3 | Status: SHIPPED | OUTPATIENT
Start: 2023-12-06

## 2023-12-06 NOTE — PROGRESS NOTES
Reason For Visit:  Congestive Heart Failure (1 MO FU )     Subjective        Aida Benitez is a 68 y.o. female with the below pertinent PMH who presents for follow-up of congestive heart failure.    Since her previous appointment has been doing well.  She ran out of her Ranexa because her pharmacy did not have it available.  She is try to get it through a different means.  She denies any worsening of her symptoms.  She still has mild to moderate dyspnea on exertion.  Still has intermittent chest pain that is relieved with rest.  She has had no change in her symptoms over the last month.  Her weights been stable without any significant lower extremity swelling.      ROS: Pertinent findings as noted above    Pertinent PMH  -Coronary artery disease   -Obesity  - Type 2 diabetes  - Hypertension  - Hyperlipidemia  - Paroxysmal atrial fibrillation  - Chronic diastolic congestive heart failure    Pertinent past medical, surgical, family, and social history were reviewed.      Current Outpatient Medications:     ALBUTEROL IN, Inhale., Disp: , Rfl:     amiodarone (PACERONE) 100 MG tablet, Take 1 tablet by mouth Daily., Disp: , Rfl:     apixaban (ELIQUIS) 5 MG tablet tablet, Take 1 tablet by mouth 2 (Two) Times a Day., Disp: 60 tablet, Rfl: 11    aspirin 81 MG EC tablet, Take 1 tablet by mouth Daily., Disp: 30 tablet, Rfl: 11    atorvastatin (LIPITOR) 40 MG tablet, Take 2 tablets by mouth Every Night., Disp: , Rfl:     B Complex Vitamins (VITAMIN B-COMPLEX PO), Take  by mouth Daily., Disp: , Rfl:     CALCIUM PO, Take  by mouth Daily., Disp: , Rfl:     carvedilol (COREG) 6.25 MG tablet, Take 1 tablet by mouth 2 (Two) Times a Day With Meals., Disp: 180 tablet, Rfl: 3    Cholecalciferol (VITAMIN D3 PO), Take  by mouth., Disp: , Rfl:     DULoxetine (CYMBALTA) 30 MG capsule, Take 1 capsule by mouth Daily., Disp: , Rfl:     ezetimibe (ZETIA) 10 MG tablet, Take 1 tablet by mouth Daily., Disp: , Rfl:     ferrous sulfate 325 (65 FE)  MG tablet, Take 1 tablet by mouth Daily With Breakfast., Disp: , Rfl:     furosemide (LASIX) 20 MG tablet, Take 1 tablet by mouth Daily., Disp: , Rfl:     gabapentin (NEURONTIN) 300 MG capsule, Take 1 capsule by mouth 3 (Three) Times a Day., Disp: , Rfl:     HYDROcodone-acetaminophen (NORCO)  MG per tablet, Take 1 tablet by mouth Every 6 (Six) Hours As Needed for Moderate Pain., Disp: , Rfl:     isosorbide mononitrate (IMDUR) 120 MG 24 hr tablet, Take 1 tablet by mouth Daily., Disp: 90 tablet, Rfl: 3    Lantus 100 UNIT/ML injection, INJECT 80 UNITS SUBCUTANEOUS TWICE DAILY, Disp: , Rfl:     levothyroxine (SYNTHROID, LEVOTHROID) 88 MCG tablet, Take 1 tablet by mouth Daily., Disp: , Rfl:     metFORMIN (GLUCOPHAGE) 1000 MG tablet, Take 1 tablet by mouth 2 (Two) Times a Day With Meals., Disp: , Rfl:     Mounjaro 7.5 MG/0.5ML solution pen-injector, 1 (One) Time Per Week., Disp: , Rfl:     nitroglycerin (NITROSTAT) 0.3 MG SL tablet, Place 1 tablet under the tongue Every 5 (Five) Minutes As Needed for Chest Pain. Take no more than 3 doses in 15 minutes., Disp: , Rfl:     nystatin (MYCOSTATIN) 410833 UNIT/GM cream, Apply 1 application  topically to the appropriate area as directed 2 (Two) Times a Day., Disp: , Rfl:     nystatin (MYCOSTATIN) 414492 UNIT/GM powder, Apply  topically to the appropriate area as directed 2 (Two) Times a Day., Disp: , Rfl:     oxybutynin (DITROPAN) 5 MG tablet, Take 1 tablet by mouth 1 (One) Time As Needed., Disp: , Rfl:     pantoprazole (PROTONIX) 40 MG EC tablet, Take 1 tablet by mouth Daily., Disp: , Rfl:     Ranolazine ER (Aspruzyo Sprinkle) 500 MG pack, Take 1,000 mg by mouth 2 (Two) Times a Day., Disp: 360 each, Rfl: 3    rOPINIRole (REQUIP) 0.5 MG tablet, Take 1 tablet by mouth Every Night. Take 1 hour before bedtime., Disp: , Rfl:     sacubitril-valsartan (Entresto) 24-26 MG tablet, Take 1 tablet by mouth 2 (Two) Times a Day., Disp: 60 tablet, Rfl: 11    spironolactone (ALDACTONE) 25  "MG tablet, Take 1 tablet by mouth Daily., Disp: 30 tablet, Rfl: 11    vitamin D (ERGOCALCIFEROL) 1.25 MG (89946 UT) capsule capsule, Take 1 capsule by mouth 2 (Two) Times a Week., Disp: , Rfl:      Objective   Vital Signs:  /66   Pulse 90   Ht 152.4 cm (60\")   Wt 112 kg (246 lb)   SpO2 95%   BMI 48.04 kg/m²   Estimated body mass index is 48.04 kg/m² as calculated from the following:    Height as of this encounter: 152.4 cm (60\").    Weight as of this encounter: 112 kg (246 lb).      Constitutional:       Appearance: Healthy appearance. Not in distress.   Pulmonary:      Effort: Pulmonary effort is normal.      Breath sounds: Normal breath sounds.   Cardiovascular:      PMI at left midclavicular line. Normal rate. Regular rhythm.      Murmurs: There is no murmur.      No gallop.  No click. No rub.   Edema:     Peripheral edema absent.   Abdominal:      General: Bowel sounds are normal.   Musculoskeletal: Normal range of motion.      Cervical back: Normal range of motion and neck supple. Skin:     General: Skin is warm.   Neurological:      Mental Status: Alert and oriented to person, place and time.        Result Review :  The following data was reviewed by: CHAIM Esteban on 12/06/2023:  CMP   CMP          3/21/2023    07:44 8/18/2023    15:38 8/18/2023    17:05 8/19/2023    05:17 8/19/2023    22:00   CMP   Glucose 205   102  126  201    BUN 12   11  12  16    Creatinine 0.86  0.90  0.71  0.83  1.07    EGFR 73.2   91.6  75.9  56.0    Sodium 137   143  141  137    Potassium 4.1   3.9  3.8  4.3    Chloride 99   107  103  100    Calcium 8.8   8.9  9.2  9.2    Total Protein 7.1   6.1   6.4    Albumin 4.0   3.5   3.6    Globulin 3.1   2.6   2.8    Total Bilirubin 0.5   0.4   0.5    Alkaline Phosphatase 103   86   91    AST (SGOT) 22   18   20    ALT (SGPT) 12   12   8    Albumin/Globulin Ratio 1.3   1.3   1.3    BUN/Creatinine Ratio 14.0   15.5  14.5  15.0    Anion Gap 13.0   11.0  12.0  12.0      Lipid " Panel   Lipid Panel          8/19/2023    05:17   Lipid Panel   Total Cholesterol 133    Triglycerides 167    HDL Cholesterol 31    VLDL Cholesterol 29    LDL Cholesterol  73    LDL/HDL Ratio 2.21      BMP   BMP          3/21/2023    07:44 8/18/2023    15:38 8/18/2023    17:05 8/19/2023    05:17 8/19/2023    22:00   BMP   BUN 12   11  12  16    Creatinine 0.86  0.90  0.71  0.83  1.07    Sodium 137   143  141  137    Potassium 4.1   3.9  3.8  4.3    Chloride 99   107  103  100    CO2 25.0   25.0  26.0  25.0    Calcium 8.8   8.9  9.2  9.2                Assessment and Plan   Diagnoses and all orders for this visit:    1. Chronic diastolic congestive heart failure (Primary)  -Patient appears euvolemic and compensated on exam without any significant changes in her symptoms  -Continue on Entresto 24/26 mg twice daily, spironolactone 25 mg daily, and carvedilol 6.25 mg twice daily    2. PAF (paroxysmal atrial fibrillation)  3. Current use of long term anticoagulation  -Patient on Eliquis 5 mg twice daily, no bleeding concerns at this time  - Appears to be normal sinus rhythm on exam today, continue on amiodarone 100 mg daily.      4. Primary hypertension  5. Mixed hyperlipidemia  -Normotensive in clinic today  - Continue on carvedilol 6.25 mg twice daily, Entresto 24/26 mg twice daily, and spironolactone 25 mg daily  -Lipid management per PCP, continue on atorvastatin and Zetia.    6. Uncontrolled type 2 diabetes mellitus with hyperglycemia  -Managed by PCP  - Patient has failed Jardiance in the past due to persistent candidiasis      7. Class 3 severe obesity due to excess calories with serious comorbidity and body mass index (BMI) of 45.0 to 49.9 in adult  Body mass index is 48.04 kg/m².      8. Coronary artery disease of native artery of native heart with stable angina pectoris  -Continue on aspirin 81 mg daily  - Continue Imdur 120 mg daily as well as Ranexa 1000 mg twice daily  -Continue on beta-blocker as noted  above    Patient and family had multiple questions today regarding different supplements that the patient could take to help improve her coronary artery disease.  I told her to my knowledge there is no significant benefit to any of the over-the-counter substances, however they are willing to try to see if she notices any improvement.  I advised him of once to avoid that would interfere with her blood thinner.             Follow Up   Return in about 3 months (around 3/6/2024) for With Trudy .  Patient was given instructions and counseling regarding her condition or for health maintenance advice. Please see specific information pulled into the AVS if appropriate.       EMR Dragon/Transcription disclaimer: Much of this encounter note is an electronic transcription/translation of spoken language to printed text. The electronic translation of spoken language may permit erroneous, or at times, nonsensical words or phrases to be inadvertently transcribed; although I have reviewed the note for such errors, some may still exist.

## 2024-01-04 ENCOUNTER — TELEPHONE (OUTPATIENT)
Dept: PODIATRY | Facility: CLINIC | Age: 69
End: 2024-01-04
Payer: MEDICARE

## 2024-01-04 NOTE — TELEPHONE ENCOUNTER
Patient called back stated she had spoken to hardy about her [facility] medicare she wanted him to know that she does have a PPO group plan. I let her know that she would be considered out of network and she may want to contact them to see how much her new cost would be just so she doesn't get a surprise bill.

## 2024-01-04 NOTE — TELEPHONE ENCOUNTER
Spoke with pt, got her rescheduled and explained that her insurance would be out of network and advised to call insurance and see what if anything she would have to pay. Pt stated understanding

## 2024-01-17 ENCOUNTER — TELEPHONE (OUTPATIENT)
Dept: CARDIOLOGY | Facility: CLINIC | Age: 69
End: 2024-01-17

## 2024-01-18 RX ORDER — SPIRONOLACTONE 25 MG/1
25 TABLET ORAL DAILY
OUTPATIENT
Start: 2024-01-18

## 2024-02-15 RX ORDER — SPIRONOLACTONE 25 MG/1
25 TABLET ORAL DAILY
Qty: 90 TABLET | Refills: 1 | Status: SHIPPED | OUTPATIENT
Start: 2024-02-15

## 2024-03-07 ENCOUNTER — OFFICE VISIT (OUTPATIENT)
Dept: CARDIOLOGY | Facility: CLINIC | Age: 69
End: 2024-03-07
Payer: MEDICARE

## 2024-03-07 VITALS
DIASTOLIC BLOOD PRESSURE: 62 MMHG | SYSTOLIC BLOOD PRESSURE: 94 MMHG | WEIGHT: 245 LBS | HEART RATE: 82 BPM | OXYGEN SATURATION: 98 % | HEIGHT: 60 IN | BODY MASS INDEX: 48.1 KG/M2

## 2024-03-07 DIAGNOSIS — I48.0 PAF (PAROXYSMAL ATRIAL FIBRILLATION): ICD-10-CM

## 2024-03-07 DIAGNOSIS — E66.01 CLASS 3 SEVERE OBESITY DUE TO EXCESS CALORIES WITH SERIOUS COMORBIDITY AND BODY MASS INDEX (BMI) OF 45.0 TO 49.9 IN ADULT: ICD-10-CM

## 2024-03-07 DIAGNOSIS — E11.65 UNCONTROLLED TYPE 2 DIABETES MELLITUS WITH HYPERGLYCEMIA: ICD-10-CM

## 2024-03-07 DIAGNOSIS — I10 PRIMARY HYPERTENSION: ICD-10-CM

## 2024-03-07 DIAGNOSIS — E78.2 MIXED HYPERLIPIDEMIA: ICD-10-CM

## 2024-03-07 DIAGNOSIS — Z79.01 CURRENT USE OF LONG TERM ANTICOAGULATION: ICD-10-CM

## 2024-03-07 DIAGNOSIS — I25.118 CORONARY ARTERY DISEASE OF NATIVE ARTERY OF NATIVE HEART WITH STABLE ANGINA PECTORIS: ICD-10-CM

## 2024-03-07 DIAGNOSIS — I50.32 CHRONIC DIASTOLIC CONGESTIVE HEART FAILURE: Primary | ICD-10-CM

## 2024-03-07 NOTE — PROGRESS NOTES
Chief Complaint  Congestive Heart Failure (3mo F/U) and Coronary Artery Disease    Subjective          Aida Benitez presents to [facility] for routine follow-up.  She has chronic diastolic congestive heart failure, multivessel coronary artery disease, paroxysmal atrial fibrillation on chronic anticoagulation, type 2 diabetes mellitus, hypertension, hyperlipidemia, type 2 diabetes mellitus and morbid obesity. She continues to complain of chronic dyspnea with mild exertion. She reports orthopnea requiring two pillows to sleep. She complains of diaphoresis with exertion. She reports decrease in frequency of chest pain while on  Ranexa. Patient denies palpitations, dizziness, syncope, orthopnea, PND, edema or decreased stamina.  Patient denies any signs of bleeding.    Congestive Heart Failure  Presents for follow-up visit. Associated symptoms include chest pain, orthopnea and shortness of breath. Pertinent negatives include no abdominal pain, chest pressure, claudication, edema, fatigue, muscle weakness, near-syncope, nocturia, palpitations, paroxysmal nocturnal dyspnea or unexpected weight change. The symptoms have been stable. Her past medical history is significant for CAD. Compliance with total regimen is 51-75%. Compliance with diet is 51-75%. Compliance with exercise is 51-75%. Compliance with medications is %.   Atrial Fibrillation  Presents for follow-up visit. Symptoms include chest pain and shortness of breath. Symptoms are negative for an AICD problem, bradycardia, dizziness, hemodynamic instability, hypertension, hypotension, pacemaker problem, palpitations, syncope and tachycardia. The symptoms have been stable. Past medical history includes atrial fibrillation, CAD, CHF and hyperlipidemia.   Hypertension  This is a chronic problem. The current episode started more than 1 year ago. The problem is controlled. Associated symptoms include chest pain and shortness of  breath. Pertinent negatives include no anxiety, blurred vision, malaise/fatigue, orthopnea, palpitations, peripheral edema, PND or sweats. Risk factors for coronary artery disease include obesity, post-menopausal state, dyslipidemia and diabetes mellitus. Current antihypertension treatment includes beta blockers and diuretics. The current treatment provides significant improvement. Hypertensive end-organ damage includes heart failure. Identifiable causes of hypertension include sleep apnea.   Hyperlipidemia  This is a chronic problem. The current episode started more than 1 year ago. Exacerbating diseases include diabetes and obesity. Associated symptoms include chest pain and shortness of breath. Current antihyperlipidemic treatment includes statins. Risk factors for coronary artery disease include hypertension, obesity, post-menopausal, dyslipidemia and diabetes mellitus.   Coronary Artery Disease  Presents for follow-up visit. Symptoms include chest pain and shortness of breath. Pertinent negatives include no chest pressure, chest tightness, dizziness, leg swelling, muscle weakness, palpitations or weight gain. Risk factors include hyperlipidemia and obesity. Risk factors do not include hypertension. Her past medical history is significant for CHF. The symptoms have been stable. Compliance with diet is variable. Compliance with exercise is variable. Compliance with medications is good.     I have reviewed and confirmed the accuracy of the ROS  CHAIM Young      Objective     Current Outpatient Medications:     ALBUTEROL IN, Inhale., Disp: , Rfl:     amiodarone (PACERONE) 100 MG tablet, Take 1 tablet by mouth Daily., Disp: , Rfl:     apixaban (ELIQUIS) 5 MG tablet tablet, Take 1 tablet by mouth 2 (Two) Times a Day., Disp: 60 tablet, Rfl: 11    aspirin 81 MG EC tablet, Take 1 tablet by mouth Daily., Disp: 30 tablet, Rfl: 11    atorvastatin (LIPITOR) 40 MG tablet, Take 2 tablets by mouth Every Night.,  Disp: , Rfl:     B Complex Vitamins (VITAMIN B-COMPLEX PO), Take  by mouth Daily., Disp: , Rfl:     CALCIUM PO, Take  by mouth Daily., Disp: , Rfl:     carvedilol (COREG) 6.25 MG tablet, Take 1 tablet by mouth 2 (Two) Times a Day With Meals., Disp: 180 tablet, Rfl: 3    Cholecalciferol (VITAMIN D3 PO), Take  by mouth., Disp: , Rfl:     DULoxetine (CYMBALTA) 30 MG capsule, Take 1 capsule by mouth Daily., Disp: , Rfl:     ezetimibe (ZETIA) 10 MG tablet, Take 1 tablet by mouth Daily., Disp: , Rfl:     ferrous sulfate 325 (65 FE) MG tablet, Take 1 tablet by mouth Daily With Breakfast., Disp: , Rfl:     furosemide (LASIX) 20 MG tablet, Take 1 tablet by mouth Daily., Disp: , Rfl:     gabapentin (NEURONTIN) 300 MG capsule, Take 1 capsule by mouth 3 (Three) Times a Day., Disp: , Rfl:     HYDROcodone-acetaminophen (NORCO)  MG per tablet, Take 1 tablet by mouth Every 6 (Six) Hours As Needed for Moderate Pain., Disp: , Rfl:     isosorbide mononitrate (IMDUR) 120 MG 24 hr tablet, Take 1 tablet by mouth Daily., Disp: 90 tablet, Rfl: 3    Lantus 100 UNIT/ML injection, INJECT 80 UNITS SUBCUTANEOUS TWICE DAILY, Disp: , Rfl:     levothyroxine (SYNTHROID, LEVOTHROID) 88 MCG tablet, Take 1 tablet by mouth Daily., Disp: , Rfl:     metFORMIN (GLUCOPHAGE) 1000 MG tablet, Take 1 tablet by mouth 2 (Two) Times a Day With Meals., Disp: , Rfl:     Mounjaro 7.5 MG/0.5ML solution pen-injector, 1 (One) Time Per Week., Disp: , Rfl:     nitroglycerin (NITROSTAT) 0.3 MG SL tablet, Place 1 tablet under the tongue Every 5 (Five) Minutes As Needed for Chest Pain. Take no more than 3 doses in 15 minutes., Disp: , Rfl:     nystatin (MYCOSTATIN) 872970 UNIT/GM cream, Apply 1 Application topically to the appropriate area as directed 2 (Two) Times a Day., Disp: , Rfl:     nystatin (MYCOSTATIN) 552428 UNIT/GM powder, Apply  topically to the appropriate area as directed 2 (Two) Times a Day., Disp: , Rfl:     oxybutynin (DITROPAN) 5 MG tablet, Take 1  "tablet by mouth 1 (One) Time As Needed., Disp: , Rfl:     pantoprazole (PROTONIX) 40 MG EC tablet, Take 1 tablet by mouth Daily., Disp: , Rfl:     sacubitril-valsartan (Entresto) 24-26 MG tablet, Take 1 tablet by mouth 2 (Two) Times a Day., Disp: 60 tablet, Rfl: 11    spironolactone (ALDACTONE) 25 MG tablet, TAKE ONE TABLET DAILY, Disp: 90 tablet, Rfl: 1    vitamin D (ERGOCALCIFEROL) 1.25 MG (73414 UT) capsule capsule, Take 1 capsule by mouth 2 (Two) Times a Week., Disp: , Rfl:   Vital Signs:   BP 94/62   Pulse 82   Ht 152.4 cm (60\")   Wt 111 kg (245 lb)   SpO2 98%   BMI 47.85 kg/m²     Vitals and nursing note reviewed.   Constitutional:       General: Not in acute distress.     Appearance: Normal and healthy appearance. Well-developed and not in distress. Morbidly obese. Not diaphoretic.   Eyes:      General: Lids are normal.         Right eye: No discharge.         Left eye: No discharge.      Conjunctiva/sclera: Conjunctivae normal.      Pupils: Pupils are equal, round, and reactive to light.   HENT:      Head: Normocephalic and atraumatic.      Jaw: There is normal jaw occlusion.      Right Ear: External ear normal.      Left Ear: External ear normal.      Nose: Nose normal.   Neck:      Thyroid: No thyromegaly.      Vascular: No carotid bruit, JVD or JVR. JVD normal.      Trachea: Trachea normal. No tracheal deviation.   Pulmonary:      Effort: Pulmonary effort is normal. No respiratory distress.      Breath sounds: Decreased air movement present. Examination of the right-lower field reveals decreased breath sounds. Examination of the left-lower field reveals decreased breath sounds. Decreased breath sounds present. No wheezing. No rhonchi. No rales.   Chest:      Chest wall: Not tender to palpatation.   Cardiovascular:      PMI at left midclavicular line. Normal rate. Regular rhythm. Normal S1. Normal S2.       Murmurs: There is no murmur.      No gallop.  No click. No rub.   Pulses:     Intact distal " pulses. No decreased pulses.   Edema:     Peripheral edema absent.   Abdominal:      General: Bowel sounds are normal. There is no distension.      Palpations: Abdomen is soft.      Tenderness: There is no abdominal tenderness.   Musculoskeletal: Normal range of motion.         General: No tenderness or deformity.      Cervical back: Normal range of motion and neck supple. Skin:     General: Skin is warm and dry.      Coloration: Skin is not pale.      Findings: No erythema or rash.   Neurological:      General: No focal deficit present.      Mental Status: Alert, oriented to person, place, and time and oriented to person, place and time.   Psychiatric:         Attention and Perception: Attention and perception normal.         Mood and Affect: Mood and affect normal.         Speech: Speech normal.         Behavior: Behavior normal.         Thought Content: Thought content normal.         Cognition and Memory: Cognition and memory normal.         Judgment: Judgment normal.        Result Review :   The following data was reviewed by: CHAIM Young on 03/07/2024:  Common labs          3/21/2023    07:13 3/21/2023    07:44 8/18/2023    13:47 8/18/2023    15:38 8/18/2023    17:05 8/19/2023    05:17 8/19/2023    22:00   Common Labs   Glucose  205    102  126  201    BUN  12    11  12  16    Creatinine  0.86   0.90  0.71  0.83  1.07    Sodium  137    143  141  137    Potassium  4.1    3.9  3.8  4.3    Chloride  99    107  103  100    Calcium  8.8    8.9  9.2  9.2    Albumin  4.0    3.5   3.6    Total Bilirubin  0.5    0.4   0.5    Alkaline Phosphatase  103    86   91    AST (SGOT)  22    18   20    ALT (SGPT)  12    12   8    WBC 9.77   9.02    8.30  6.96    Hemoglobin 12.7   12.7    11.5  11.4    Hematocrit 39.2   40.1    37.2  36.0    Platelets 233   254    216  204    Total Cholesterol      133     Triglycerides      167     HDL Cholesterol      31     LDL Cholesterol       73     Hemoglobin A1C      7.70      Data reviewed: Cardiology studies holter monitor 12/11/22, 2d echo 12/17/22, lexiscan 12/16/22 and CTA of the coronary arteries 1/20/23         Assessment and Plan    Diagnoses and all orders for this visit:    1. Chronic diastolic congestive heart failure (HCC) (Primary)-NYHA class II.  Stage C.  Compensated. Reviewed signs and symptoms of CHF and what to report with the patient. Patient instructed to restrict sodium and weigh daily. Report weight gain of greater than 2 lbs overnight or 5 lbs in 1 week. Pt verbalized understanding of instructions and plan of care.  Pt has failed Jardiance in the past due to persistent candidiasis.  Continue spironolactone, carvedilol and Entresto. Consider up titration of Entresto and spironlactone in the future, if tolerated.     2. PAF (paroxysmal atrial fibrillation) ([facility])- in sinus rhythm today.  Anticoagulated.  Stable.  Continue amiodarone.    3. Current use of long term anticoagulation-patient continues on Eliquis.  Denies bleeding.    4. Primary hypertension-blood pressure is well controlled.  Continue Entresto, spironolactone and carvedilol.  Monitor and record daily blood pressure. Report readings consistently higher than 130/80 or consistently lower than 100/60.     5. Mixed hyperlipidemia-management per PCP.  Continue atorvastatin.    6. Uncontrolled type 2 diabetes mellitus with hyperglycemia ([facility])- type 2 diabetes mellitus in the setting of congestive heart failure.  Patient has failed Jardiance in the past due to persistent candidiasis.  Management per PCP.    7. Class 3 severe obesity due to excess calories with serious comorbidity and body mass index (BMI) of 45.0 to 49.9 in adult ([facility])- Class 3 Severe Obesity (BMI >=40). Obesity-related health conditions include the following: obstructive sleep apnea, hypertension, diabetes mellitus, dyslipidemias and GERD. Obesity is unchanged. BMI is is above average; no BMI management plan is appropriate. We discussed low  calorie, low carb based diet program, portion control and increasing exercise.    8. Coronary artery disease of native artery of native heart with stable angina pectoris-patient has been referred to CT surgery at [facility] in [place], [place] for high risk CABG. She was evaluated in June, however since that time the practice has transitioned her care to another physician. Left heart cath 8/18/23 revealed 70% stenosis of 2nd PDL that will be treated medically. Mid-LAD had heavy calcification with abnormal RFR, however balloon was unable to be inflated in that area. Case discussed with Dr. Denson, and he recommends medical management for now.  Continue Imdur. Pt was previously on Ranexa, however she is unable to swallow tablet. Pharmacy has been unable to obtain sprinkle form.     Follow Up   Return in about 3 months (around 6/7/2024) for Next scheduled follow up.  Patient was given instructions and counseling regarding her condition or for health maintenance advice. Please see specific information pulled into the AVS if appropriate.

## 2024-03-15 RX ORDER — ASPIRIN 81 MG/1
81 TABLET, COATED ORAL DAILY
Qty: 30 TABLET | Refills: 11 | Status: SHIPPED | OUTPATIENT
Start: 2024-03-15

## 2024-07-09 RX ORDER — CARVEDILOL 6.25 MG/1
6.25 TABLET ORAL 2 TIMES DAILY WITH MEALS
Qty: 180 TABLET | Refills: 3 | Status: SHIPPED | OUTPATIENT
Start: 2024-07-09

## 2024-07-09 RX ORDER — SPIRONOLACTONE 25 MG/1
25 TABLET ORAL DAILY
Qty: 90 TABLET | Refills: 3 | Status: SHIPPED | OUTPATIENT
Start: 2024-07-09

## 2024-07-17 NOTE — PROGRESS NOTES
Chief Complaint  Congestive Heart Failure (4mo F/U), Paroxysmal Afib, and Hypertension    Subjective          Aida Benitez presents to [facility] for routine follow-up.  She has chronic diastolic congestive heart failure, multivessel coronary artery disease, paroxysmal atrial fibrillation on chronic anticoagulation, type 2 diabetes mellitus, hypertension, hyperlipidemia, type 2 diabetes mellitus and morbid obesity. She continues to complain of chronic dyspnea with mild to moderate exertion. She reports orthopnea requiring two pillows to sleep. She complains of diaphoresis with exertion. She reports decrease in frequency of chest pain while on  Ranexa, however she does occasionally have pain that improves with nitroglycerin. She states that she has taken nitroglycerin six or seven times since her last visit three months ago. Patient denies palpitations, dizziness, syncope, orthopnea, PND, edema or decreased stamina.  Patient denies any signs of bleeding.    Congestive Heart Failure  Presents for follow-up visit. Associated symptoms include chest pain, orthopnea and shortness of breath. Pertinent negatives include no abdominal pain, chest pressure, claudication, edema, fatigue, muscle weakness, near-syncope, nocturia, palpitations, paroxysmal nocturnal dyspnea or unexpected weight change. The symptoms have been stable. Her past medical history is significant for CAD. Compliance with total regimen is 51-75%. Compliance with diet is 51-75%. Compliance with exercise is 51-75%. Compliance with medications is %.   Atrial Fibrillation  Presents for follow-up visit. Symptoms include chest pain and shortness of breath. Symptoms are negative for an AICD problem, bradycardia, dizziness, hemodynamic instability, hypertension, hypotension, pacemaker problem, palpitations, syncope and tachycardia. The symptoms have been stable. Past medical history includes atrial fibrillation, CAD, CHF and  hyperlipidemia.   Hypertension  This is a chronic problem. The current episode started more than 1 year ago. The problem is controlled. Associated symptoms include chest pain and shortness of breath. Pertinent negatives include no anxiety, blurred vision, malaise/fatigue, orthopnea, palpitations, peripheral edema, PND or sweats. Risk factors for coronary artery disease include obesity, post-menopausal state, dyslipidemia and diabetes mellitus. Current antihypertension treatment includes beta blockers and diuretics. The current treatment provides significant improvement. Hypertensive end-organ damage includes heart failure. Identifiable causes of hypertension include sleep apnea.   Hyperlipidemia  This is a chronic problem. The current episode started more than 1 year ago. Exacerbating diseases include diabetes and obesity. Associated symptoms include chest pain and shortness of breath. Current antihyperlipidemic treatment includes statins. Risk factors for coronary artery disease include hypertension, obesity, post-menopausal, dyslipidemia and diabetes mellitus.   Coronary Artery Disease  Presents for follow-up visit. Symptoms include chest pain and shortness of breath. Pertinent negatives include no chest pressure, chest tightness, dizziness, leg swelling, muscle weakness, palpitations or weight gain. Risk factors include hyperlipidemia and obesity. Risk factors do not include hypertension. Her past medical history is significant for CHF. The symptoms have been stable. Compliance with diet is variable. Compliance with exercise is variable. Compliance with medications is good.     I have reviewed and confirmed the accuracy of the ROS CHAIM Young        Objective     Current Outpatient Medications:     ALBUTEROL IN, Inhale., Disp: , Rfl:     amiodarone (PACERONE) 100 MG tablet, Take 1 tablet by mouth Daily., Disp: , Rfl:     apixaban (ELIQUIS) 5 MG tablet tablet, Take 1 tablet by mouth 2 (Two) Times a  Day., Disp: 60 tablet, Rfl: 11    Aspirin Low Dose 81 MG EC tablet, TAKE ONE TABLET DAILY, Disp: 30 tablet, Rfl: 11    atorvastatin (LIPITOR) 40 MG tablet, Take 2 tablets by mouth Every Night., Disp: , Rfl:     B Complex Vitamins (VITAMIN B-COMPLEX PO), Take  by mouth Daily., Disp: , Rfl:     CALCIUM PO, Take  by mouth Daily., Disp: , Rfl:     carvedilol (COREG) 6.25 MG tablet, TAKE 1 TABLET BY MOUTH 2 (TWO) TIMES A DAY WITH MEALS., Disp: 180 tablet, Rfl: 3    Cholecalciferol (VITAMIN D3 PO), Take  by mouth., Disp: , Rfl:     DULoxetine (CYMBALTA) 30 MG capsule, Take 1 capsule by mouth Daily., Disp: , Rfl:     ezetimibe (ZETIA) 10 MG tablet, Take 1 tablet by mouth Daily., Disp: , Rfl:     ferrous sulfate 325 (65 FE) MG tablet, Take 1 tablet by mouth Daily With Breakfast., Disp: , Rfl:     furosemide (LASIX) 20 MG tablet, Take 1 tablet by mouth As Needed., Disp: , Rfl:     gabapentin (NEURONTIN) 300 MG capsule, Take 1 capsule by mouth 3 (Three) Times a Day., Disp: , Rfl:     HumaLOG KwikPen 100 UNIT/ML solution pen-injector, , Disp: , Rfl:     HYDROcodone-acetaminophen (NORCO)  MG per tablet, Take 1 tablet by mouth Every 6 (Six) Hours As Needed for Moderate Pain., Disp: , Rfl:     isosorbide mononitrate (IMDUR) 120 MG 24 hr tablet, Take 1 tablet by mouth Daily., Disp: 90 tablet, Rfl: 3    Lantus 100 UNIT/ML injection, INJECT 80 UNITS SUBCUTANEOUS TWICE DAILY, Disp: , Rfl:     levothyroxine (SYNTHROID, LEVOTHROID) 88 MCG tablet, Take 1 tablet by mouth Daily., Disp: , Rfl:     metFORMIN (GLUCOPHAGE) 1000 MG tablet, Take 1 tablet by mouth 2 (Two) Times a Day With Meals., Disp: , Rfl:     Mounjaro 7.5 MG/0.5ML solution pen-injector, 1 (One) Time Per Week., Disp: , Rfl:     nitroglycerin (NITROSTAT) 0.3 MG SL tablet, Place 1 tablet under the tongue Every 5 (Five) Minutes As Needed for Chest Pain. Take no more than 3 doses in 15 minutes., Disp: , Rfl:     nystatin (MYCOSTATIN) 752382 UNIT/GM cream, Apply 1  "Application topically to the appropriate area as directed 2 (Two) Times a Day., Disp: , Rfl:     nystatin (MYCOSTATIN) 770910 UNIT/GM powder, Apply  topically to the appropriate area as directed 2 (Two) Times a Day., Disp: , Rfl:     oxybutynin (DITROPAN) 5 MG tablet, Take 1 tablet by mouth 1 (One) Time As Needed., Disp: , Rfl:     pantoprazole (PROTONIX) 40 MG EC tablet, Take 1 tablet by mouth Daily., Disp: , Rfl:     sacubitril-valsartan (Entresto) 24-26 MG tablet, Take 1 tablet by mouth 2 (Two) Times a Day., Disp: 60 tablet, Rfl: 11    spironolactone (ALDACTONE) 25 MG tablet, TAKE ONE TABLET DAILY, Disp: 90 tablet, Rfl: 3    vitamin D (ERGOCALCIFEROL) 1.25 MG (69310 UT) capsule capsule, Take 1 capsule by mouth 2 (Two) Times a Week., Disp: , Rfl:   Vital Signs:   /73   Pulse 70   Ht 152.4 cm (60\")   Wt 102 kg (224 lb)   SpO2 97%   BMI 43.75 kg/m²     Vitals and nursing note reviewed.   Constitutional:       General: Not in acute distress.     Appearance: Normal and healthy appearance. Well-developed and not in distress. Morbidly obese. Not diaphoretic.   Eyes:      General: Lids are normal.         Right eye: No discharge.         Left eye: No discharge.      Conjunctiva/sclera: Conjunctivae normal.      Pupils: Pupils are equal, round, and reactive to light.   HENT:      Head: Normocephalic and atraumatic.      Jaw: There is normal jaw occlusion.      Right Ear: External ear normal.      Left Ear: External ear normal.      Nose: Nose normal.   Neck:      Thyroid: No thyromegaly.      Vascular: No carotid bruit, JVD or JVR. JVD normal.      Trachea: Trachea normal. No tracheal deviation.   Pulmonary:      Effort: Pulmonary effort is normal. No respiratory distress.      Breath sounds: Decreased air movement present. Examination of the right-lower field reveals decreased breath sounds. Examination of the left-lower field reveals decreased breath sounds. Decreased breath sounds present. No wheezing. No " rhonchi. No rales.   Chest:      Chest wall: Not tender to palpatation.   Cardiovascular:      PMI at left midclavicular line. Normal rate. Regular rhythm. Normal S1. Normal S2.       Murmurs: There is no murmur.      No gallop.  No click. No rub.   Pulses:     Intact distal pulses. No decreased pulses.   Edema:     Peripheral edema absent.   Abdominal:      General: Bowel sounds are normal. There is no distension.      Palpations: Abdomen is soft.      Tenderness: There is no abdominal tenderness.   Musculoskeletal: Normal range of motion.         General: No tenderness or deformity.      Cervical back: Normal range of motion and neck supple. Skin:     General: Skin is warm and dry.      Coloration: Skin is not pale.      Findings: No erythema or rash.   Neurological:      General: No focal deficit present.      Mental Status: Alert, oriented to person, place, and time and oriented to person, place and time.   Psychiatric:         Attention and Perception: Attention and perception normal.         Mood and Affect: Mood and affect normal.         Speech: Speech normal.         Behavior: Behavior normal.         Thought Content: Thought content normal.         Cognition and Memory: Cognition and memory normal.         Judgment: Judgment normal.        Result Review :   The following data was reviewed by: CHAIM Young on 07/18/2024:  Common labs          8/18/2023    13:47 8/18/2023    15:38 8/18/2023    17:05 8/19/2023    05:17 8/19/2023    22:00   Common Labs   Glucose   102  126  201    BUN   11  12  16    Creatinine  0.90  0.71  0.83  1.07    Sodium   143  141  137    Potassium   3.9  3.8  4.3    Chloride   107  103  100    Calcium   8.9  9.2  9.2    Albumin   3.5   3.6    Total Bilirubin   0.4   0.5    Alkaline Phosphatase   86   91    AST (SGOT)   18   20    ALT (SGPT)   12   8    WBC 9.02    8.30  6.96    Hemoglobin 12.7    11.5  11.4    Hematocrit 40.1    37.2  36.0    Platelets 254    216  204     Total Cholesterol    133     Triglycerides    167     HDL Cholesterol    31     LDL Cholesterol     73     Hemoglobin A1C    7.70     Data reviewed: Cardiology studies holter monitor 12/11/22, 2d echo 12/17/22, lexiscan 12/16/22 and CTA of the coronary arteries 1/20/23    ECG 12 Lead    Date/Time: 7/18/2024 3:06 PM  Performed by: Trudy Pierre APRN    Authorized by: Trudy Pierre APRN  Comparison: compared with previous ECG from 11/2/2023  Rhythm: sinus rhythm  Rate: normal  BPM: 70  QRS axis: indeterminate    Clinical impression: abnormal EKG            Assessment and Plan    Diagnoses and all orders for this visit:    1. Chronic diastolic congestive heart failure (HCC) (Primary)-NYHA class II.  Stage C.  Compensated. Reviewed signs and symptoms of CHF and what to report with the patient. Patient instructed to restrict sodium and weigh daily. Report weight gain of greater than 2 lbs overnight or 5 lbs in 1 week. Pt verbalized understanding of instructions and plan of care.  Pt has failed Jardiance in the past due to persistent candidiasis.  Continue spironolactone, carvedilol and Entresto. Consider up titration of Entresto and spironlactone in the future, if tolerated.     2. PAF (paroxysmal atrial fibrillation) ([facility])- in sinus rhythm today.  Anticoagulated.  Stable.  Continue amiodarone.    3. Current use of long term anticoagulation-patient continues on Eliquis.  Denies bleeding.    4. Primary hypertension-blood pressure is well controlled.  Continue Entresto, spironolactone and carvedilol.  Monitor and record daily blood pressure. Report readings consistently higher than 130/80 or consistently lower than 100/60.     5. Mixed hyperlipidemia-management per PCP.  Continue atorvastatin.    6. Uncontrolled type 2 diabetes mellitus with hyperglycemia (HCC)- type 2 diabetes mellitus in the setting of congestive heart failure.  Patient has failed Jardiance in the past due to persistent candidiasis.   Management per PCP.    7. Class 3 severe obesity due to excess calories with serious comorbidity and body mass index (BMI) of 45.0 to 49.9 in adult ([facility])- Class 3 Severe Obesity (BMI >=40). Obesity-related health conditions include the following: obstructive sleep apnea, hypertension, diabetes mellitus, dyslipidemias and GERD. Obesity is unchanged. BMI is is above average; no BMI management plan is appropriate. We discussed low calorie, low carb based diet program, portion control and increasing exercise.    8. Coronary artery disease of native artery of native heart with stable angina pectoris-patient has been referred to CT surgery at [facility] in [place], [place] for high risk CABG. She was evaluated in June, however since that time the practice has transitioned her care to another physician. Left heart cath 8/18/23 revealed 70% stenosis of 2nd PDL that will be treated medically. Mid-LAD had heavy calcification with abnormal RFR, however balloon was unable to be inflated in that area. Case discussed with Dr. Denson, and he recommends medical management for now.  Continue Imdur. Pt was previously on Ranexa, however she is unable to swallow tablet. Pharmacy has been unable to obtain sprinkle form.     Follow Up   Return in about 3 months (around 10/18/2024) for Next scheduled follow up.  Patient was given instructions and counseling regarding her condition or for health maintenance advice. Please see specific information pulled into the AVS if appropriate.

## 2024-07-18 ENCOUNTER — OFFICE VISIT (OUTPATIENT)
Dept: CARDIOLOGY | Facility: CLINIC | Age: 69
End: 2024-07-18
Payer: MEDICARE

## 2024-07-18 VITALS
HEART RATE: 70 BPM | DIASTOLIC BLOOD PRESSURE: 73 MMHG | WEIGHT: 224 LBS | HEIGHT: 60 IN | BODY MASS INDEX: 43.98 KG/M2 | SYSTOLIC BLOOD PRESSURE: 112 MMHG | OXYGEN SATURATION: 97 %

## 2024-07-18 DIAGNOSIS — I50.32 CHRONIC DIASTOLIC CONGESTIVE HEART FAILURE: Primary | ICD-10-CM

## 2024-07-18 DIAGNOSIS — E78.2 MIXED HYPERLIPIDEMIA: ICD-10-CM

## 2024-07-18 DIAGNOSIS — I10 PRIMARY HYPERTENSION: ICD-10-CM

## 2024-07-18 DIAGNOSIS — I25.118 CORONARY ARTERY DISEASE OF NATIVE ARTERY OF NATIVE HEART WITH STABLE ANGINA PECTORIS: ICD-10-CM

## 2024-07-18 DIAGNOSIS — I48.0 PAF (PAROXYSMAL ATRIAL FIBRILLATION): ICD-10-CM

## 2024-07-18 DIAGNOSIS — Z79.01 CURRENT USE OF LONG TERM ANTICOAGULATION: ICD-10-CM

## 2024-07-18 DIAGNOSIS — E66.01 CLASS 3 SEVERE OBESITY DUE TO EXCESS CALORIES WITH SERIOUS COMORBIDITY AND BODY MASS INDEX (BMI) OF 45.0 TO 49.9 IN ADULT: ICD-10-CM

## 2024-07-18 DIAGNOSIS — E11.65 UNCONTROLLED TYPE 2 DIABETES MELLITUS WITH HYPERGLYCEMIA: ICD-10-CM

## 2024-07-18 RX ORDER — INSULIN LISPRO 100 [IU]/ML
INJECTION, SOLUTION INTRAVENOUS; SUBCUTANEOUS
COMMUNITY
Start: 2024-07-08

## 2024-07-22 ENCOUNTER — DOCUMENTATION (OUTPATIENT)
Dept: CARDIOLOGY | Facility: CLINIC | Age: 69
End: 2024-07-22
Payer: MEDICARE

## 2024-07-22 NOTE — PROGRESS NOTES
Spoke with Dr. Dunlap's office. They stated that pt was referred to Dr. Tristin Anguiano with [facility] in [place], [place] for high risk PCI, however the pt cancelled her appointment with him in August of 2023. Left a message with Dr. Anguiano's MA today for pt to be rescheduled to see Dr. Anguiano. I discussed this with the pt, and she is awaiting a phone call from Dr. Anguiano's office.    Cheek-To-Nose Interpolation Flap Text: A decision was made to reconstruct the defect utilizing an interpolation axial flap and a staged reconstruction.  A telfa template was made of the defect.  This telfa template was then used to outline the Cheek-To-Nose Interpolation flap.  The donor area for the pedicle flap was then injected with anesthesia.  The flap was excised through the skin and subcutaneous tissue down to the layer of the underlying musculature.  The interpolation flap was carefully excised within this deep plane to maintain its blood supply.  The edges of the donor site were undermined.   The donor site was closed in a primary fashion.  The pedicle was then rotated into position and sutured.  Once the tube was sutured into place, adequate blood supply was confirmed with blanching and refill.  The pedicle was then wrapped with xeroform gauze and dressed appropriately with a telfa and gauze bandage to ensure continued blood supply and protect the attached pedicle.

## 2024-10-09 RX ORDER — SACUBITRIL AND VALSARTAN 24; 26 MG/1; MG/1
1 TABLET, FILM COATED ORAL 2 TIMES DAILY
Qty: 60 TABLET | Refills: 11 | Status: SHIPPED | OUTPATIENT
Start: 2024-10-09

## 2024-10-11 ENCOUNTER — TELEPHONE (OUTPATIENT)
Age: 69
End: 2024-10-11

## 2024-10-11 NOTE — TELEPHONE ENCOUNTER
Patient is calling regarding the following:  pt called said that she is ready to have the surgery and her AC1 is in a good range. I told her that someone would give her a call.

## 2024-10-15 ENCOUNTER — TELEPHONE (OUTPATIENT)
Age: 69
End: 2024-10-15

## 2024-10-15 NOTE — TELEPHONE ENCOUNTER
Pt called wanting to know when she can be schedule to get a nogal on big toe removed. Not able to wear closed toed shoes

## 2024-10-16 NOTE — TELEPHONE ENCOUNTER
Called the patient and asked where her lab work was done, said it was done at her doctors office in [place], I let her know that I would call over there and get those results for Dr. Wilder to look at and call her back for a surgery date.

## 2024-10-22 ENCOUNTER — OFFICE VISIT (OUTPATIENT)
Dept: CARDIOLOGY | Facility: CLINIC | Age: 69
End: 2024-10-22
Payer: MEDICARE

## 2024-10-22 VITALS
WEIGHT: 227 LBS | DIASTOLIC BLOOD PRESSURE: 60 MMHG | OXYGEN SATURATION: 98 % | HEART RATE: 67 BPM | HEIGHT: 60 IN | SYSTOLIC BLOOD PRESSURE: 97 MMHG | BODY MASS INDEX: 44.57 KG/M2

## 2024-10-22 DIAGNOSIS — Z79.01 CURRENT USE OF LONG TERM ANTICOAGULATION: ICD-10-CM

## 2024-10-22 DIAGNOSIS — E66.813 CLASS 3 SEVERE OBESITY DUE TO EXCESS CALORIES WITH SERIOUS COMORBIDITY AND BODY MASS INDEX (BMI) OF 45.0 TO 49.9 IN ADULT: ICD-10-CM

## 2024-10-22 DIAGNOSIS — I48.0 PAF (PAROXYSMAL ATRIAL FIBRILLATION): ICD-10-CM

## 2024-10-22 DIAGNOSIS — I10 PRIMARY HYPERTENSION: ICD-10-CM

## 2024-10-22 DIAGNOSIS — E66.01 CLASS 3 SEVERE OBESITY DUE TO EXCESS CALORIES WITH SERIOUS COMORBIDITY AND BODY MASS INDEX (BMI) OF 45.0 TO 49.9 IN ADULT: ICD-10-CM

## 2024-10-22 DIAGNOSIS — E11.65 UNCONTROLLED TYPE 2 DIABETES MELLITUS WITH HYPERGLYCEMIA: ICD-10-CM

## 2024-10-22 DIAGNOSIS — I50.32 CHRONIC DIASTOLIC CONGESTIVE HEART FAILURE: Primary | ICD-10-CM

## 2024-10-22 DIAGNOSIS — I25.118 CORONARY ARTERY DISEASE OF NATIVE ARTERY OF NATIVE HEART WITH STABLE ANGINA PECTORIS: ICD-10-CM

## 2024-10-22 DIAGNOSIS — E78.2 MIXED HYPERLIPIDEMIA: ICD-10-CM

## 2024-10-22 PROCEDURE — 1160F RVW MEDS BY RX/DR IN RCRD: CPT | Performed by: NURSE PRACTITIONER

## 2024-10-22 PROCEDURE — 3078F DIAST BP <80 MM HG: CPT | Performed by: NURSE PRACTITIONER

## 2024-10-22 PROCEDURE — 99214 OFFICE O/P EST MOD 30 MIN: CPT | Performed by: NURSE PRACTITIONER

## 2024-10-22 PROCEDURE — 1159F MED LIST DOCD IN RCRD: CPT | Performed by: NURSE PRACTITIONER

## 2024-10-22 PROCEDURE — 3074F SYST BP LT 130 MM HG: CPT | Performed by: NURSE PRACTITIONER

## 2024-10-22 RX ORDER — SERTRALINE HYDROCHLORIDE 25 MG/1
25 TABLET, FILM COATED ORAL DAILY
COMMUNITY

## 2024-10-22 RX ORDER — CARVEDILOL 3.12 MG/1
3.12 TABLET ORAL 2 TIMES DAILY WITH MEALS
Qty: 180 TABLET | Refills: 3 | Status: SHIPPED | OUTPATIENT
Start: 2024-10-22

## 2024-10-22 RX ORDER — CELECOXIB 100 MG/1
100 CAPSULE ORAL DAILY
COMMUNITY

## 2024-10-22 NOTE — PROGRESS NOTES
Chief Complaint  Congestive Heart Failure (3mo F/U), Coronary Artery Disease, Paroxysmal Afib, and Hypertension    Subjective          Aida Benitez presents to [facility] for routine follow-up.  She has chronic diastolic congestive heart failure, multivessel coronary artery disease, paroxysmal atrial fibrillation on chronic anticoagulation, type 2 diabetes mellitus, hypertension, hyperlipidemia, type 2 diabetes mellitus and morbid obesity. She continues to complain of chronic dyspnea with mild to moderate exertion. She reports orthopnea requiring two pillows to sleep. She complains of diaphoresis with exertion. She reports decrease in frequency of chest pain while on  Ranexa, however she does occasionally have pain that improves with nitroglycerin. She states that she has not had to take nitroglycerin since she was here last three months ago. Patient denies palpitations, dizziness, syncope, orthopnea, PND, edema or decreased stamina.  Patient denies any signs of bleeding.    Congestive Heart Failure  Presents for follow-up visit. Associated symptoms include chest pain, orthopnea and shortness of breath. Pertinent negatives include no abdominal pain, chest pressure, claudication, edema, fatigue, muscle weakness, near-syncope, nocturia, palpitations, paroxysmal nocturnal dyspnea or unexpected weight change. The symptoms have been stable. Her past medical history is significant for CAD. Compliance with total regimen is 51-75%. Compliance with diet is 51-75%. Compliance with exercise is 51-75%. Compliance with medications is %.   Atrial Fibrillation  Presents for follow-up visit. Symptoms include chest pain and shortness of breath. Symptoms are negative for an AICD problem, bradycardia, dizziness, hemodynamic instability, hypertension, hypotension, pacemaker problem, palpitations, syncope and tachycardia. The symptoms have been stable. Past medical history includes atrial  fibrillation, CAD, CHF and hyperlipidemia.   Hypertension  This is a chronic problem. The current episode started more than 1 year ago. The problem is controlled. Associated symptoms include chest pain and shortness of breath. Pertinent negatives include no anxiety, blurred vision, malaise/fatigue, orthopnea, palpitations, peripheral edema, PND or sweats. Risk factors for coronary artery disease include obesity, post-menopausal state, dyslipidemia and diabetes mellitus. Current antihypertension treatment includes beta blockers and diuretics. The current treatment provides significant improvement. Hypertensive end-organ damage includes heart failure. Identifiable causes of hypertension include sleep apnea.   Hyperlipidemia  This is a chronic problem. The current episode started more than 1 year ago. Exacerbating diseases include diabetes and obesity. Associated symptoms include chest pain and shortness of breath. Current antihyperlipidemic treatment includes statins. Risk factors for coronary artery disease include hypertension, obesity, post-menopausal, dyslipidemia and diabetes mellitus.   Coronary Artery Disease  Presents for follow-up visit. Symptoms include chest pain and shortness of breath. Pertinent negatives include no chest pressure, chest tightness, dizziness, leg swelling, muscle weakness, palpitations or weight gain. Risk factors include hyperlipidemia and obesity. Risk factors do not include hypertension. Her past medical history is significant for CHF. The symptoms have been stable. Compliance with diet is variable. Compliance with exercise is variable. Compliance with medications is good.     I have reviewed and confirmed the accuracy of the ROS CHAIM Young      Objective     Current Outpatient Medications:     ALBUTEROL IN, Inhale., Disp: , Rfl:     amiodarone (PACERONE) 100 MG tablet, Take 1 tablet by mouth Daily., Disp: , Rfl:     apixaban (ELIQUIS) 5 MG tablet tablet, Take 1 tablet by  mouth 2 (Two) Times a Day., Disp: 60 tablet, Rfl: 11    Aspirin Low Dose 81 MG EC tablet, TAKE ONE TABLET DAILY, Disp: 30 tablet, Rfl: 11    atorvastatin (LIPITOR) 40 MG tablet, Take 2 tablets by mouth Every Night., Disp: , Rfl:     B Complex Vitamins (VITAMIN B-COMPLEX PO), Take  by mouth Daily., Disp: , Rfl:     CALCIUM PO, Take  by mouth Daily., Disp: , Rfl:     carvedilol (COREG) 3.125 MG tablet, Take 1 tablet by mouth 2 (Two) Times a Day With Meals., Disp: 180 tablet, Rfl: 3    celecoxib (CeleBREX) 100 MG capsule, Take 1 capsule by mouth Daily., Disp: , Rfl:     Cholecalciferol (VITAMIN D3 PO), Take  by mouth., Disp: , Rfl:     Entresto 24-26 MG tablet, TAKE 1 TABLET BY MOUTH 2 (TWO) TIMES A DAY., Disp: 60 tablet, Rfl: 11    ezetimibe (ZETIA) 10 MG tablet, Take 1 tablet by mouth Daily., Disp: , Rfl:     ferrous sulfate 325 (65 FE) MG tablet, Take 1 tablet by mouth As Needed., Disp: , Rfl:     furosemide (LASIX) 20 MG tablet, Take 1 tablet by mouth As Needed., Disp: , Rfl:     gabapentin (NEURONTIN) 300 MG capsule, Take 1 capsule by mouth 3 (Three) Times a Day., Disp: , Rfl:     HumaLOG KwikPen 100 UNIT/ML solution pen-injector, , Disp: , Rfl:     HYDROcodone-acetaminophen (NORCO)  MG per tablet, Take 1 tablet by mouth Every 6 (Six) Hours As Needed for Moderate Pain., Disp: , Rfl:     isosorbide mononitrate (IMDUR) 120 MG 24 hr tablet, Take 1 tablet by mouth Daily., Disp: 90 tablet, Rfl: 3    Lantus 100 UNIT/ML injection, INJECT 80 UNITS SUBCUTANEOUS TWICE DAILY, Disp: , Rfl:     levothyroxine (SYNTHROID, LEVOTHROID) 88 MCG tablet, Take 1 tablet by mouth Daily., Disp: , Rfl:     metFORMIN (GLUCOPHAGE) 1000 MG tablet, Take 1 tablet by mouth 2 (Two) Times a Day With Meals., Disp: , Rfl:     Mounjaro 7.5 MG/0.5ML solution pen-injector, 1 (One) Time Per Week., Disp: , Rfl:     nitroglycerin (NITROSTAT) 0.3 MG SL tablet, Place 1 tablet under the tongue Every 5 (Five) Minutes As Needed for Chest Pain. Take no  "more than 3 doses in 15 minutes., Disp: , Rfl:     nystatin (MYCOSTATIN) 135841 UNIT/GM cream, Apply 1 Application topically to the appropriate area as directed 2 (Two) Times a Day., Disp: , Rfl:     nystatin (MYCOSTATIN) 600514 UNIT/GM powder, Apply  topically to the appropriate area as directed 2 (Two) Times a Day., Disp: , Rfl:     oxybutynin (DITROPAN) 5 MG tablet, Take 1 tablet by mouth 1 (One) Time As Needed., Disp: , Rfl:     pantoprazole (PROTONIX) 40 MG EC tablet, Take 1 tablet by mouth Daily., Disp: , Rfl:     sertraline (ZOLOFT) 25 MG tablet, Take 1 tablet by mouth Daily., Disp: , Rfl:     spironolactone (ALDACTONE) 25 MG tablet, TAKE ONE TABLET DAILY, Disp: 90 tablet, Rfl: 3    vitamin D (ERGOCALCIFEROL) 1.25 MG (26899 UT) capsule capsule, Take 1 capsule by mouth 2 (Two) Times a Week., Disp: , Rfl:   Vital Signs:   BP 97/60   Pulse 67   Ht 152.4 cm (60\")   Wt 103 kg (227 lb)   SpO2 98%   BMI 44.33 kg/m²     Vitals and nursing note reviewed.   Constitutional:       General: Not in acute distress.     Appearance: Normal and healthy appearance. Well-developed and not in distress. Morbidly obese. Not diaphoretic.   Eyes:      General: Lids are normal.         Right eye: No discharge.         Left eye: No discharge.      Conjunctiva/sclera: Conjunctivae normal.      Pupils: Pupils are equal, round, and reactive to light.   HENT:      Head: Normocephalic and atraumatic.      Jaw: There is normal jaw occlusion.      Right Ear: External ear normal.      Left Ear: External ear normal.      Nose: Nose normal.   Neck:      Thyroid: No thyromegaly.      Vascular: No carotid bruit, JVD or JVR. JVD normal.      Trachea: Trachea normal. No tracheal deviation.   Pulmonary:      Effort: Pulmonary effort is normal. No respiratory distress.      Breath sounds: Decreased air movement present. Examination of the right-lower field reveals decreased breath sounds. Examination of the left-lower field reveals decreased " breath sounds. Decreased breath sounds present. No wheezing. No rhonchi. No rales.   Chest:      Chest wall: Not tender to palpatation.   Cardiovascular:      PMI at left midclavicular line. Normal rate. Regular rhythm. Normal S1. Normal S2.       Murmurs: There is no murmur.      No gallop.  No click. No rub.   Pulses:     Intact distal pulses. No decreased pulses.   Edema:     Peripheral edema absent.   Abdominal:      General: Bowel sounds are normal. There is no distension.      Palpations: Abdomen is soft.      Tenderness: There is no abdominal tenderness.   Musculoskeletal: Normal range of motion.         General: No tenderness or deformity.      Cervical back: Normal range of motion and neck supple. Skin:     General: Skin is warm and dry.      Coloration: Skin is not pale.      Findings: No erythema or rash.   Neurological:      General: No focal deficit present.      Mental Status: Alert, oriented to person, place, and time and oriented to person, place and time.   Psychiatric:         Attention and Perception: Attention and perception normal.         Mood and Affect: Mood and affect normal.         Speech: Speech normal.         Behavior: Behavior normal.         Thought Content: Thought content normal.         Cognition and Memory: Cognition and memory normal.         Judgment: Judgment normal.        Result Review :   The following data was reviewed by: CHAIM Young on 10/22/2024:    Data reviewed: Cardiology studies holter monitor 12/11/22, 2d echo 12/17/22, lexiscan 12/16/22 and CTA of the coronary arteries 1/20/23         Assessment and Plan    Diagnoses and all orders for this visit:    1. Chronic diastolic congestive heart failure (HCC) (Primary)-NYHA class II.  Stage C.  Compensated. Reviewed signs and symptoms of CHF and what to report with the patient. Patient instructed to restrict sodium and weigh daily. Report weight gain of greater than 2 lbs overnight or 5 lbs in 1 week. Pt  verbalized understanding of instructions and plan of care.  Pt has failed Jardiance in the past due to persistent candidiasis.  Continue spironolactone, carvedilol and Entresto. Consider up titration of Entresto and spironlactone in the future, if tolerated.     2. PAF (paroxysmal atrial fibrillation) ([facility])- in sinus rhythm today.  Anticoagulated.  Stable.  Continue amiodarone.    3. Current use of long term anticoagulation-patient continues on Eliquis.  Denies bleeding.    4. Primary hypertension-blood pressure has been low. Decrease carvedilol to 3.125 mg twice daily. Continue Entresto and spironolactone.  Monitor and record daily blood pressure. Report readings consistently higher than 130/80 or consistently lower than 100/60.     5. Mixed hyperlipidemia-management per PCP.  Continue atorvastatin.    6. Uncontrolled type 2 diabetes mellitus with hyperglycemia (HCC)- type 2 diabetes mellitus in the setting of congestive heart failure.  Patient has failed Jardiance in the past due to persistent candidiasis.  Management per PCP.    7. Class 3 severe obesity due to excess calories with serious comorbidity and body mass index (BMI) of 45.0 to 49.9 in adult ([facility])- Class 3 Severe Obesity (BMI >=40). Obesity-related health conditions include the following: obstructive sleep apnea, hypertension, diabetes mellitus, dyslipidemias and GERD. Obesity is unchanged. BMI is is above average; no BMI management plan is appropriate. We discussed low calorie, low carb based diet program, portion control and increasing exercise.    8. Coronary artery disease of native artery of native heart with stable angina pectoris-patient has been referred to CT surgery at [facility] in [place], [place] for high risk CABG. She was evaluated in June, however since that time the practice has transitioned her care to Dr. Tristin Anguiano. Left heart cath 8/18/23 revealed 70% stenosis of 2nd PDL that will be treated medically. Mid-LAD had heavy  calcification with abnormal RFR, however balloon was unable to be inflated in that area. Continue Imdur. Pt was previously on Ranexa, however she is unable to swallow tablet. Pharmacy has been unable to obtain sprinkle form.     Follow Up   Return in about 6 months (around 4/22/2025) for Next scheduled follow up.  Patient was given instructions and counseling regarding her condition or for health maintenance advice. Please see specific information pulled into the AVS if appropriate.

## 2024-11-05 ENCOUNTER — TELEPHONE (OUTPATIENT)
Age: 69
End: 2024-11-05

## 2024-11-05 NOTE — TELEPHONE ENCOUNTER
PT is wanting to know if she is ready for surgery, she had her blood work done 6 weeks ago at [facility]

## 2024-11-22 ENCOUNTER — TELEPHONE (OUTPATIENT)
Dept: CARDIOLOGY | Facility: CLINIC | Age: 69
End: 2024-11-22
Payer: MEDICARE

## 2024-11-22 NOTE — TELEPHONE ENCOUNTER
Caller: Aida Benitez    Relationship: Self    Best call back number:   Telephone Information:   Mobile [phone]     What is the best time to reach you: ANY    Who are you requesting to speak with (clinical staff, provider,  specific staff member): CLINICAL    What was the call regarding: PATIENT IS WANTING TO KNOW IF DR. CHUN WOULD BE ABLE TO DO A PET SCAN ON HER HEART AND AN ECHO.  THE DOCTOR IN [place] SAID THAT IF SHE WAS ABLE TO DO THAT HERE SHE WOULDN'T HAVE TO GO TO [place] FOR IT AND DR. CHUN COULD SEND HIM THE TEST RESULTS.  PLEASE REACH OUT AND LET HER KNOW IF THIS WOULD BE POSSIBLE OR NOT.

## 2024-12-13 ENCOUNTER — TELEPHONE (OUTPATIENT)
Age: 69
End: 2024-12-13

## 2024-12-13 ENCOUNTER — TRANSCRIBE ORDERS (OUTPATIENT)
Dept: ADMINISTRATIVE | Age: 69
End: 2024-12-13

## 2024-12-13 DIAGNOSIS — M89.8X7 EXOSTOSIS OF TOE: Primary | ICD-10-CM

## 2024-12-13 DIAGNOSIS — I70.0 ATHEROSCLEROSIS OF AORTA (HCC): Primary | ICD-10-CM

## 2024-12-13 NOTE — TELEPHONE ENCOUNTER
Patient would like to know where she is going to have her procedure at. She states she supposed to have one on the 19 and wants more information on this. She wants to talk to someone in [facility]  PHONE: [phone]

## 2024-12-16 ENCOUNTER — TELEPHONE (OUTPATIENT)
Age: 69
End: 2024-12-16

## 2024-12-16 DIAGNOSIS — I73.9 PVD (PERIPHERAL VASCULAR DISEASE) (HCC): Primary | ICD-10-CM

## 2024-12-16 NOTE — TELEPHONE ENCOUNTER
[facility] is calling for a order to be faxed to them. Pt is having an THAI done tomorrow and they do not have an order.  Fax [phone]

## 2024-12-16 NOTE — TELEPHONE ENCOUNTER
[facility] is calling to get a new order faxed over before pt's appt tomorrow. The code is [id number] for the lower segmental

## 2024-12-17 ENCOUNTER — TELEPHONE (OUTPATIENT)
Dept: VASCULAR SURGERY | Facility: CLINIC | Age: 69
End: 2024-12-17
Payer: MEDICARE

## 2024-12-18 ENCOUNTER — OFFICE VISIT (OUTPATIENT)
Dept: VASCULAR SURGERY | Facility: CLINIC | Age: 69
End: 2024-12-18
Payer: MEDICARE

## 2024-12-18 VITALS
HEART RATE: 77 BPM | WEIGHT: 223 LBS | BODY MASS INDEX: 44.96 KG/M2 | HEIGHT: 59 IN | OXYGEN SATURATION: 98 % | SYSTOLIC BLOOD PRESSURE: 124 MMHG | DIASTOLIC BLOOD PRESSURE: 76 MMHG

## 2024-12-18 DIAGNOSIS — I73.9 PAD (PERIPHERAL ARTERY DISEASE): Primary | ICD-10-CM

## 2024-12-18 DIAGNOSIS — R60.0 BILATERAL LOWER EXTREMITY EDEMA: ICD-10-CM

## 2024-12-18 DIAGNOSIS — E11.42 TYPE 2 DIABETES MELLITUS WITH DIABETIC POLYNEUROPATHY, WITH LONG-TERM CURRENT USE OF INSULIN: ICD-10-CM

## 2024-12-18 DIAGNOSIS — I65.23 BILATERAL CAROTID ARTERY STENOSIS: ICD-10-CM

## 2024-12-18 DIAGNOSIS — Z79.4 TYPE 2 DIABETES MELLITUS WITH DIABETIC POLYNEUROPATHY, WITH LONG-TERM CURRENT USE OF INSULIN: ICD-10-CM

## 2024-12-18 DIAGNOSIS — I10 BENIGN ESSENTIAL HYPERTENSION: ICD-10-CM

## 2024-12-18 DIAGNOSIS — E78.5 HYPERLIPIDEMIA, UNSPECIFIED HYPERLIPIDEMIA TYPE: ICD-10-CM

## 2024-12-18 PROBLEM — M1A.9XX0 CHRONIC GOUT: Status: RESOLVED | Noted: 2024-12-18 | Resolved: 2024-12-18

## 2024-12-18 PROBLEM — E11.9 DM (DIABETES MELLITUS): Status: ACTIVE | Noted: 2024-12-18

## 2024-12-18 PROBLEM — M79.18 MYOFASCIAL PAIN: Status: ACTIVE | Noted: 2021-12-09

## 2024-12-18 PROBLEM — M1A.09X0 CHRONIC GOUT OF MULTIPLE SITES: Status: ACTIVE | Noted: 2020-01-14

## 2024-12-18 PROBLEM — R94.39 ABNORMAL CARDIOVASCULAR STRESS TEST: Status: ACTIVE | Noted: 2021-01-18

## 2024-12-18 PROBLEM — I25.10 NONOBSTRUCTIVE ATHEROSCLEROSIS OF CORONARY ARTERY: Status: ACTIVE | Noted: 2021-03-17

## 2024-12-18 PROBLEM — D51.0 PERNICIOUS ANEMIA: Status: ACTIVE | Noted: 2020-01-14

## 2024-12-18 PROBLEM — R00.0 TACHYCARDIA: Status: ACTIVE | Noted: 2024-09-11

## 2024-12-18 PROBLEM — E66.9 OBESITY: Status: ACTIVE | Noted: 2022-11-15

## 2024-12-18 PROBLEM — G47.30 SLEEP APNEA: Status: ACTIVE | Noted: 2020-10-10

## 2024-12-18 PROBLEM — E78.2 HYPERLIPIDEMIA, MIXED: Status: ACTIVE | Noted: 2020-12-21

## 2024-12-18 PROBLEM — F41.9 ANXIETY: Status: ACTIVE | Noted: 2021-05-22

## 2024-12-18 PROBLEM — I25.10 CAD (CORONARY ARTERY DISEASE): Status: ACTIVE | Noted: 2021-03-17

## 2024-12-18 PROBLEM — N18.9 CKD (CHRONIC KIDNEY DISEASE): Status: ACTIVE | Noted: 2020-12-11

## 2024-12-18 PROBLEM — G89.29 CHRONIC PAIN: Status: ACTIVE | Noted: 2021-12-09

## 2024-12-18 PROBLEM — J01.91 ACUTE RECURRENT SINUSITIS: Status: RESOLVED | Noted: 2020-07-20 | Resolved: 2024-12-18

## 2024-12-18 PROBLEM — L40.50 PSORIATIC ARTHRITIS: Status: ACTIVE | Noted: 2020-01-14

## 2024-12-18 PROBLEM — J44.9 COPD (CHRONIC OBSTRUCTIVE PULMONARY DISEASE): Status: ACTIVE | Noted: 2024-12-18

## 2024-12-18 PROBLEM — U07.1 DISEASE DUE TO SEVERE ACUTE RESPIRATORY SYNDROME CORONAVIRUS 2 (SARS-COV-2): Status: RESOLVED | Noted: 2020-09-30 | Resolved: 2024-12-18

## 2024-12-18 PROBLEM — E55.9 VITAMIN D DEFICIENCY: Status: ACTIVE | Noted: 2020-01-14

## 2024-12-18 PROBLEM — J32.9 SINUSITIS: Status: RESOLVED | Noted: 2021-05-22 | Resolved: 2024-12-18

## 2024-12-18 PROBLEM — I48.20 CHRONIC ATRIAL FIBRILLATION: Status: ACTIVE | Noted: 2020-10-30

## 2024-12-18 PROBLEM — R07.89 CHEST HEAVINESS: Status: ACTIVE | Noted: 2020-10-15

## 2024-12-18 NOTE — PROGRESS NOTES
12/18/2024      Chey Craig APRN  [address] [place] [id number]    Aida Benitez  1955    Chief Complaint   Patient presents with    NEW PATIENT     Referred from Chey Craig for abnormal BRIDGET done at [facility]. Patient complains of swelling in both legs. Was told that pulse was weaker in left, but both feel the same to her. Never been a smoker.        Dear CHAIM Muñoz:      HPI  I had the pleasure of seeing your patient Aida Benitez in the office today.  Thank you kindly for this consultation.  As you recall, Aida Benitez is a 69 y.o.  female who you are currently following for routine health maintenance.  She is here today with complaints of bilateral lower extremity swelling.  She states she was told that her pulse was weaker in her left leg.  She denies any symptoms of claudication or ischemia symptoms.  She denies any strokelike symptoms.  She is maintained on aspirin, Lipitor, Zetia, and Eliquis.  She did have noninvasive testing performed which I did review in office.    Past Medical History:   Diagnosis Date    Acute recurrent sinusitis 07/20/2020    Anxiety     Arrhythmia     Arthritis     Asthma     Atrial fibrillation     Bronchitis, chronic 1969    CHF (congestive heart failure)     Chronic gout 12/18/2024    CKD (chronic kidney disease) 12/11/2020    Claustrophobia     Clotting disorder     COPD (chronic obstructive pulmonary disease)     COPD (chronic obstructive pulmonary disease) 12/18/2024    Coronary artery disease     Coughing blood     Depression     Diabetes mellitus     Disease due to severe acute respiratory syndrome coronavirus 2 (SARS-CoV-2) 09/30/2020    Disease of thyroid gland     Encounter for screening colonoscopy 06/18/2014    Food in larynx causing asphyxiation, initial encounter     GERD (gastroesophageal reflux disease)     Hiatal hernia 2014    Hyperlipidemia     Hyperlipidemia 12/20/2020    Hypertension     Hyperthyroidism 05/27/2016    Hypothyroidism      Myocardial infarction 2020    Restless leg     Sinusitis 05/22/2021    Sleep apnea     Unstable angina 07/14/2023    Vitamin B deficiency     Vitamin D deficiency        Past Surgical History:   Procedure Laterality Date    ARTHROSCOPY SHOULDER W/ OPEN ROTATOR CUFF REPAIR Right     CARDIAC CATHETERIZATION N/A 03/21/2023    Procedure: Left Heart Cath;  Surgeon: Landon Denson MD;  Location:  [facility];  Service: Cardiology;  Laterality: N/A;    CARDIAC CATHETERIZATION N/A 8/18/2023    Procedure: Percutaneous Coronary Intervention;  Surgeon: Landon Denson MD;  Location:  [facility];  Service: Cardiology;  Laterality: N/A;    CATARACT EXTRACTION Bilateral     FOOT NAVICULAR EXCISION OR BONE GRAFT Right     GASTRIC SLEEVE LAPAROSCOPIC      TUBAL ABDOMINAL LIGATION         Family History   Problem Relation Age of Onset    Hypertension Mother     Heart disease Mother     Arrhythmia Mother     Asthma Mother     Cancer Mother     Thyroid disease Mother     Stroke Mother     Hypertension Father     Heart disease Father     Colon polyps Father     Prostate cancer Father     Cancer Father     Heart failure Father     Hypertension Sister     Heart attack Sister     Atrial fibrillation Sister     COPD Sister     Cancer Brother     Cancer Sister     Cancer Brother     Cancer Brother     Cancer Sister     Colon cancer Neg Hx        Social History     Socioeconomic History    Marital status:    Tobacco Use    Smoking status: Never    Smokeless tobacco: Never   Vaping Use    Vaping status: Never Used   Substance and Sexual Activity    Alcohol use: Never    Drug use: Never    Sexual activity: Not Currently     Partners: Male     Birth control/protection: None       Allergies   Allergen Reactions    Isosorb Dinitrate-Hydralazine Shortness Of Breath    Niacin Shortness Of Breath and Unknown (See Comments)     Other reaction(s): airway   -    -       Tamiflu [Oseltamivir] Rash       Current  Outpatient Medications   Medication Instructions    ALBUTEROL IN Inhale.    amiodarone (PACERONE) 100 mg, Daily    apixaban (ELIQUIS) 5 mg, Oral, 2 Times Daily    Aspirin Low Dose 81 mg, Oral, Daily    atorvastatin (LIPITOR) 80 mg, Nightly    B Complex Vitamins (VITAMIN B-COMPLEX PO) Daily    CALCIUM PO Daily    carvedilol (COREG) 3.125 mg, Oral, 2 Times Daily With Meals    celecoxib (CELEBREX) 100 mg, Daily    Cholecalciferol (VITAMIN D3 PO) Take  by mouth.    Entresto 24-26 MG tablet 1 tablet, Oral, 2 Times Daily    ezetimibe (ZETIA) 10 mg, Daily    ferrous sulfate 325 mg, As Needed    furosemide (LASIX) 20 mg, As Needed    gabapentin (NEURONTIN) 300 mg, 3 Times Daily    HumaLOG KwikPen 100 UNIT/ML solution pen-injector     HYDROcodone-acetaminophen (NORCO)  MG per tablet 1 tablet, Every 6 Hours PRN    isosorbide mononitrate (IMDUR) 120 mg, Oral, Daily    Lantus 100 UNIT/ML injection INJECT 80 UNITS SUBCUTANEOUS TWICE DAILY    levothyroxine (SYNTHROID, LEVOTHROID) 88 mcg, Daily    metFORMIN (GLUCOPHAGE) 1,000 mg, 2 Times Daily With Meals    Mounjaro 7.5 MG/0.5ML solution pen-injector 1 (One) Time Per Week.    nitroglycerin (NITROSTAT) 0.3 mg, Every 5 Minutes PRN    nystatin (MYCOSTATIN) 925311 UNIT/GM cream 2 Times Daily    nystatin (MYCOSTATIN) 093855 UNIT/GM powder 2 Times Daily    oxybutynin (DITROPAN) 5 mg, Once As Needed    pantoprazole (PROTONIX) 40 mg, Daily    sertraline (ZOLOFT) 25 mg, Daily    spironolactone (ALDACTONE) 25 mg, Oral, Daily    vitamin D (ERGOCALCIFEROL) 50,000 Units, 2 Times Weekly           Review of Systems   Constitutional: Negative.  Negative for diaphoresis and fever.   HENT: Negative.     Eyes: Negative.    Respiratory: Negative.  Negative for shortness of breath and wheezing.    Cardiovascular:  Positive for leg swelling.   Gastrointestinal: Negative.  Negative for abdominal pain.   Endocrine: Negative.    Genitourinary: Negative.    Musculoskeletal: Negative.  Negative for  "gait problem.   Skin: Negative.    Allergic/Immunologic: Negative.    Neurological: Negative.  Negative for dizziness and numbness.   Hematological: Negative.    Psychiatric/Behavioral: Negative.         /76   Pulse 77   Ht 149.9 cm (59\")   Wt 101 kg (223 lb)   SpO2 98%   BMI 45.04 kg/m²       Physical Exam  Vitals and nursing note reviewed.   Constitutional:       General: She is not in acute distress.     Appearance: Normal appearance. She is morbidly obese. She is not diaphoretic.   HENT:      Head: Normocephalic. No right periorbital erythema or left periorbital erythema.      Nose: Nose normal.   Eyes:      General: No scleral icterus.     Pupils: Pupils are equal.   Cardiovascular:      Rate and Rhythm: Normal rate and regular rhythm.      Pulses: Normal pulses.           Popliteal pulses are 2+ on the right side and 2+ on the left side.        Dorsalis pedis pulses are detected w/ Doppler on the right side and detected w/ Doppler on the left side.        Posterior tibial pulses are detected w/ Doppler on the right side and detected w/ Doppler on the left side.      Heart sounds: Normal heart sounds. No murmur heard.     Comments: Bilateral peroneal signals dopplered  Pulmonary:      Effort: Pulmonary effort is normal. No respiratory distress.      Breath sounds: Normal breath sounds.   Abdominal:      General: Bowel sounds are normal. There is no distension.      Palpations: Abdomen is soft.      Tenderness: There is no abdominal tenderness. There is no guarding.   Musculoskeletal:         General: No swelling or tenderness. Normal range of motion.      Cervical back: Normal range of motion and neck supple.      Right lower leg: Edema present.      Left lower leg: Edema present.   Feet:      Right foot:      Skin integrity: Skin integrity normal.      Left foot:      Skin integrity: Skin integrity normal.   Skin:     General: Skin is warm and dry.      Findings: No erythema or rash.   Neurological: "      General: No focal deficit present.      Mental Status: She is alert and oriented to person, place, and time. Mental status is at baseline.      Cranial Nerves: No cranial nerve deficit.      Gait: Gait normal.   Psychiatric:         Attention and Perception: Attention normal.         Mood and Affect: Mood normal.         Behavior: Behavior normal.         Thought Content: Thought content normal.         Judgment: Judgment normal.         DIAGNOSTIC DATA      Patient Active Problem List   Diagnosis    History of anaphylaxis to COVID-19 vaccine    MCNEILL (dyspnea on exertion)    Obesity    Chest heaviness    Palpitations    Uncontrolled type 2 diabetes mellitus with hyperglycemia    Hyperlipidemia    Dysphagia    Abnormal cardiovascular stress test    Chronic diastolic congestive heart failure    Chronic atrial fibrillation    Current use of long term anticoagulation    Benign essential hypertension    CAD (coronary artery disease)    Abnormal cardiac CT angiography    Unstable angina    CAD (coronary artery disease)    Zenker diverticula    Anxiety    Sleep apnea    Chronic gout of multiple sites    Chronic pain    CKD (chronic kidney disease)    COPD (chronic obstructive pulmonary disease)    DDD (degenerative disc disease), cervical    DM (diabetes mellitus)    Dyslipidemia    GERD (gastroesophageal reflux disease)    High risk medication use    Hallux limitus, acquired    History of sleeve gastrectomy    Hyperthyroidism    Lumbar stenosis with neurogenic claudication    Lumbosacral spondylosis without myelopathy    Onychocryptosis    Nonobstructive atherosclerosis of coronary artery    Myofascial pain    Pernicious anemia    Psoriatic arthritis    Rheumatoid arthritis involving multiple sites    Tachycardia    Type 2 diabetes mellitus with diabetic polyneuropathy, with long-term current use of insulin    Vitamin D deficiency    Hyperlipidemia, mixed         ICD-10-CM ICD-9-CM   1. PAD (peripheral artery disease)   I73.9 443.9   2. Benign essential hypertension  I10 401.1   3. Hyperlipidemia, unspecified hyperlipidemia type  E78.5 272.4   4. Type 2 diabetes mellitus with diabetic polyneuropathy, with long-term current use of insulin  E11.42 250.60    Z79.4 357.2     V58.67   5. Bilateral carotid artery stenosis  I65.23 433.10     433.30           Plan: After thoroughly evaluating Aida Benitez, I believe the best course of action is to remain conservative from vascular surgery standpoint.  I did review her ABIs which show no arterial insufficiency.  We will see her back in 1 month with noninvasive testing to include carotid duplex for further surveillance.  She is supposed to have surgery for removal of a nodule to her great left toe tomorrow and has an upcoming appointment in [place] to check for her a-fib.  She should continue her aspirin 81 mg daily, Lipitor 80 mg nightly, Eliquis 5 mg twice daily, and Zetia 10 mg daily, in addition to her other medications. I did discuss vascular risk factors as they pertain to the progression of vascular disease including controlling her hypertension, hyperlipidemia, and diabetes.  Her blood pressure stable today in office.  A lipid panel from August 2023 shows elevated triglycerides at 167 and decreased HDL at 31, all other values are within normal limits.  Hemoglobin A1c from 1 year ago in August 2023 shows uncontrolled at 7.7%.  Body mass index is 45.04 kg/m².       This was all discussed in full with complete understanding.    Thank you for allowing me to participate in the care of your patient.  Please do not hesitate with any questions or concerns.  I will keep you aware of any further encounters with Aida Benitez.        Sincerely yours,         CHAIM Artis

## 2024-12-19 ENCOUNTER — ANESTHESIA (OUTPATIENT)
Dept: PERIOP | Facility: HOSPITAL | Age: 69
End: 2024-12-19
Payer: MEDICARE

## 2024-12-19 ENCOUNTER — HOSPITAL ENCOUNTER (OUTPATIENT)
Facility: HOSPITAL | Age: 69
Setting detail: HOSPITAL OUTPATIENT SURGERY
Discharge: HOME | End: 2024-12-19
Attending: PODIATRIST | Admitting: PODIATRIST
Payer: MEDICARE

## 2024-12-19 ENCOUNTER — ANESTHESIA EVENT (OUTPATIENT)
Dept: PERIOP | Facility: HOSPITAL | Age: 69
End: 2024-12-19
Payer: MEDICARE

## 2024-12-19 VITALS
HEIGHT: 59 IN | BODY MASS INDEX: 45.91 KG/M2 | DIASTOLIC BLOOD PRESSURE: 63 MMHG | HEART RATE: 83 BPM | RESPIRATION RATE: 16 BRPM | SYSTOLIC BLOOD PRESSURE: 132 MMHG | TEMPERATURE: 97.7 F | OXYGEN SATURATION: 96 % | WEIGHT: 227.74 LBS

## 2024-12-19 DIAGNOSIS — M79.672 PAIN OF LEFT FOOT: ICD-10-CM

## 2024-12-19 DIAGNOSIS — M89.8X7 EXOSTOSIS OF TOE: Primary | ICD-10-CM

## 2024-12-19 LAB
ANION GAP SERPL CALCULATED.3IONS-SCNC: 10 MMOL/L (ref 5–15)
BUN SERPL-MCNC: 8 MG/DL (ref 8–23)
BUN/CREAT SERPL: 8.4 (ref 7–25)
CALCIUM SPEC-SCNC: 9.1 MG/DL (ref 8.6–10.5)
CHLORIDE SERPL-SCNC: 107 MMOL/L (ref 98–107)
CO2 SERPL-SCNC: 26 MMOL/L (ref 22–29)
CREAT SERPL-MCNC: 0.95 MG/DL (ref 0.57–1)
DEPRECATED RDW RBC AUTO: 44.1 FL (ref 37–54)
EGFRCR SERPLBLD CKD-EPI 2021: 64.2 ML/MIN/1.73
ERYTHROCYTE [DISTWIDTH] IN BLOOD BY AUTOMATED COUNT: 13.7 % (ref 12.3–15.4)
GLUCOSE BLDC GLUCOMTR-MCNC: 119 MG/DL (ref 70–130)
GLUCOSE BLDC GLUCOMTR-MCNC: 188 MG/DL (ref 70–130)
GLUCOSE SERPL-MCNC: 187 MG/DL (ref 65–99)
HCT VFR BLD AUTO: 35.9 % (ref 34–46.6)
HGB BLD-MCNC: 11.3 G/DL (ref 12–15.9)
MCH RBC QN AUTO: 27.8 PG (ref 26.6–33)
MCHC RBC AUTO-ENTMCNC: 31.5 G/DL (ref 31.5–35.7)
MCV RBC AUTO: 88.2 FL (ref 79–97)
PLATELET # BLD AUTO: 203 10*3/MM3 (ref 140–450)
PMV BLD AUTO: 9.2 FL (ref 6–12)
POTASSIUM SERPL-SCNC: 3.8 MMOL/L (ref 3.5–5.2)
RBC # BLD AUTO: 4.07 10*6/MM3 (ref 3.77–5.28)
SODIUM SERPL-SCNC: 143 MMOL/L (ref 136–145)
WBC NRBC COR # BLD AUTO: 7.82 10*3/MM3 (ref 3.4–10.8)

## 2024-12-19 PROCEDURE — 25010000002 DROPERIDOL PER 5 MG: Performed by: NURSE ANESTHETIST, CERTIFIED REGISTERED

## 2024-12-19 PROCEDURE — 82948 REAGENT STRIP/BLOOD GLUCOSE: CPT

## 2024-12-19 PROCEDURE — 25010000002 ROPIVACAINE PER 1 MG: Performed by: PODIATRIST

## 2024-12-19 PROCEDURE — 25010000002 LIDOCAINE PF 2% 2 % SOLUTION: Performed by: NURSE ANESTHETIST, CERTIFIED REGISTERED

## 2024-12-19 PROCEDURE — 25010000002 NALOXONE PER 1 MG: Performed by: NURSE ANESTHETIST, CERTIFIED REGISTERED

## 2024-12-19 PROCEDURE — 25010000002 PROPOFOL 10 MG/ML EMULSION: Performed by: NURSE ANESTHETIST, CERTIFIED REGISTERED

## 2024-12-19 PROCEDURE — 80048 BASIC METABOLIC PNL TOTAL CA: CPT | Performed by: PODIATRIST

## 2024-12-19 PROCEDURE — 25010000002 CEFAZOLIN PER 500 MG: Performed by: NURSE ANESTHETIST, CERTIFIED REGISTERED

## 2024-12-19 PROCEDURE — 25810000003 LACTATED RINGERS PER 1000 ML: Performed by: PODIATRIST

## 2024-12-19 PROCEDURE — 25010000002 ONDANSETRON PER 1 MG: Performed by: NURSE ANESTHETIST, CERTIFIED REGISTERED

## 2024-12-19 PROCEDURE — 25010000002 FENTANYL CITRATE (PF) 50 MCG/ML SOLUTION: Performed by: NURSE ANESTHETIST, CERTIFIED REGISTERED

## 2024-12-19 PROCEDURE — 85027 COMPLETE CBC AUTOMATED: CPT | Performed by: PODIATRIST

## 2024-12-19 PROCEDURE — 88305 TISSUE EXAM BY PATHOLOGIST: CPT | Performed by: PODIATRIST

## 2024-12-19 RX ORDER — VECURONIUM BROMIDE 1 MG/ML
INJECTION, POWDER, LYOPHILIZED, FOR SOLUTION INTRAVENOUS AS NEEDED
Status: DISCONTINUED | OUTPATIENT
Start: 2024-12-19 | End: 2024-12-19 | Stop reason: SURG

## 2024-12-19 RX ORDER — DROPERIDOL 2.5 MG/ML
INJECTION, SOLUTION INTRAMUSCULAR; INTRAVENOUS AS NEEDED
Status: DISCONTINUED | OUTPATIENT
Start: 2024-12-19 | End: 2024-12-19 | Stop reason: SURG

## 2024-12-19 RX ORDER — CEFAZOLIN SODIUM 1 G/3ML
INJECTION, POWDER, FOR SOLUTION INTRAMUSCULAR; INTRAVENOUS AS NEEDED
Status: DISCONTINUED | OUTPATIENT
Start: 2024-12-19 | End: 2024-12-19 | Stop reason: SURG

## 2024-12-19 RX ORDER — ONDANSETRON 2 MG/ML
INJECTION INTRAMUSCULAR; INTRAVENOUS AS NEEDED
Status: DISCONTINUED | OUTPATIENT
Start: 2024-12-19 | End: 2024-12-19 | Stop reason: SURG

## 2024-12-19 RX ORDER — LIDOCAINE HYDROCHLORIDE 20 MG/ML
INJECTION, SOLUTION EPIDURAL; INFILTRATION; INTRACAUDAL; PERINEURAL AS NEEDED
Status: DISCONTINUED | OUTPATIENT
Start: 2024-12-19 | End: 2024-12-19 | Stop reason: SURG

## 2024-12-19 RX ORDER — FENTANYL CITRATE 50 UG/ML
50 INJECTION, SOLUTION INTRAMUSCULAR; INTRAVENOUS
Status: DISCONTINUED | OUTPATIENT
Start: 2024-12-19 | End: 2024-12-19 | Stop reason: HOSPADM

## 2024-12-19 RX ORDER — HYDROCODONE BITARTRATE AND ACETAMINOPHEN 10; 325 MG/1; MG/1
1 TABLET ORAL EVERY 4 HOURS PRN
Status: DISCONTINUED | OUTPATIENT
Start: 2024-12-19 | End: 2024-12-19 | Stop reason: HOSPADM

## 2024-12-19 RX ORDER — LIDOCAINE HYDROCHLORIDE 10 MG/ML
0.5 INJECTION, SOLUTION EPIDURAL; INFILTRATION; INTRACAUDAL; PERINEURAL ONCE AS NEEDED
Status: DISCONTINUED | OUTPATIENT
Start: 2024-12-19 | End: 2024-12-19 | Stop reason: HOSPADM

## 2024-12-19 RX ORDER — NALOXONE HCL 0.4 MG/ML
0.4 VIAL (ML) INJECTION AS NEEDED
Status: DISCONTINUED | OUTPATIENT
Start: 2024-12-19 | End: 2024-12-19 | Stop reason: HOSPADM

## 2024-12-19 RX ORDER — SODIUM CHLORIDE 0.9 % (FLUSH) 0.9 %
3 SYRINGE (ML) INJECTION AS NEEDED
Status: DISCONTINUED | OUTPATIENT
Start: 2024-12-19 | End: 2024-12-19 | Stop reason: HOSPADM

## 2024-12-19 RX ORDER — LABETALOL HYDROCHLORIDE 5 MG/ML
5 INJECTION, SOLUTION INTRAVENOUS
Status: DISCONTINUED | OUTPATIENT
Start: 2024-12-19 | End: 2024-12-19 | Stop reason: HOSPADM

## 2024-12-19 RX ORDER — IBUPROFEN 600 MG/1
600 TABLET, FILM COATED ORAL EVERY 6 HOURS PRN
Status: DISCONTINUED | OUTPATIENT
Start: 2024-12-19 | End: 2024-12-19 | Stop reason: HOSPADM

## 2024-12-19 RX ORDER — ROPIVACAINE HYDROCHLORIDE 5 MG/ML
INJECTION, SOLUTION EPIDURAL; INFILTRATION; PERINEURAL AS NEEDED
Status: DISCONTINUED | OUTPATIENT
Start: 2024-12-19 | End: 2024-12-19 | Stop reason: HOSPADM

## 2024-12-19 RX ORDER — FENTANYL CITRATE 50 UG/ML
INJECTION, SOLUTION INTRAMUSCULAR; INTRAVENOUS AS NEEDED
Status: DISCONTINUED | OUTPATIENT
Start: 2024-12-19 | End: 2024-12-19 | Stop reason: SURG

## 2024-12-19 RX ORDER — OXYCODONE AND ACETAMINOPHEN 7.5; 325 MG/1; MG/1
1 TABLET ORAL EVERY 4 HOURS PRN
Qty: 10 TABLET | Refills: 0 | Status: SHIPPED | OUTPATIENT
Start: 2024-12-19

## 2024-12-19 RX ORDER — HYDROCODONE BITARTRATE AND ACETAMINOPHEN 5; 325 MG/1; MG/1
1 TABLET ORAL EVERY 4 HOURS PRN
Status: DISCONTINUED | OUTPATIENT
Start: 2024-12-19 | End: 2024-12-19 | Stop reason: HOSPADM

## 2024-12-19 RX ORDER — FLUMAZENIL 0.1 MG/ML
0.2 INJECTION INTRAVENOUS AS NEEDED
Status: DISCONTINUED | OUTPATIENT
Start: 2024-12-19 | End: 2024-12-19 | Stop reason: HOSPADM

## 2024-12-19 RX ORDER — ONDANSETRON 4 MG/1
4 TABLET, FILM COATED ORAL EVERY 4 HOURS
Qty: 10 TABLET | Refills: 0 | Status: SHIPPED | OUTPATIENT
Start: 2024-12-19

## 2024-12-19 RX ORDER — PROPOFOL 10 MG/ML
VIAL (ML) INTRAVENOUS AS NEEDED
Status: DISCONTINUED | OUTPATIENT
Start: 2024-12-19 | End: 2024-12-19 | Stop reason: SURG

## 2024-12-19 RX ORDER — NALOXONE HYDROCHLORIDE 0.4 MG/ML
INJECTION, SOLUTION INTRAMUSCULAR; INTRAVENOUS; SUBCUTANEOUS AS NEEDED
Status: DISCONTINUED | OUTPATIENT
Start: 2024-12-19 | End: 2024-12-19 | Stop reason: SURG

## 2024-12-19 RX ORDER — SODIUM CHLORIDE, SODIUM LACTATE, POTASSIUM CHLORIDE, CALCIUM CHLORIDE 600; 310; 30; 20 MG/100ML; MG/100ML; MG/100ML; MG/100ML
1000 INJECTION, SOLUTION INTRAVENOUS CONTINUOUS
Status: DISCONTINUED | OUTPATIENT
Start: 2024-12-19 | End: 2024-12-19 | Stop reason: HOSPADM

## 2024-12-19 RX ORDER — MAGNESIUM HYDROXIDE 1200 MG/15ML
LIQUID ORAL AS NEEDED
Status: DISCONTINUED | OUTPATIENT
Start: 2024-12-19 | End: 2024-12-19 | Stop reason: HOSPADM

## 2024-12-19 RX ORDER — ONDANSETRON 2 MG/ML
4 INJECTION INTRAMUSCULAR; INTRAVENOUS ONCE AS NEEDED
Status: DISCONTINUED | OUTPATIENT
Start: 2024-12-19 | End: 2024-12-19 | Stop reason: HOSPADM

## 2024-12-19 RX ADMIN — SODIUM CHLORIDE, POTASSIUM CHLORIDE, SODIUM LACTATE AND CALCIUM CHLORIDE: 600; 310; 30; 20 INJECTION, SOLUTION INTRAVENOUS at 16:06

## 2024-12-19 RX ADMIN — VECURONIUM BROMIDE 5 MG: 1 INJECTION, POWDER, LYOPHILIZED, FOR SOLUTION INTRAVENOUS at 16:06

## 2024-12-19 RX ADMIN — FENTANYL CITRATE 100 MCG: 50 INJECTION, SOLUTION INTRAMUSCULAR; INTRAVENOUS at 16:06

## 2024-12-19 RX ADMIN — ONDANSETRON 4 MG: 2 INJECTION INTRAMUSCULAR; INTRAVENOUS at 16:05

## 2024-12-19 RX ADMIN — CEFAZOLIN 2 G: 330 INJECTION, POWDER, FOR SOLUTION INTRAMUSCULAR; INTRAVENOUS at 16:05

## 2024-12-19 RX ADMIN — PROPOFOL 100 MG: 10 INJECTION, EMULSION INTRAVENOUS at 16:06

## 2024-12-19 RX ADMIN — LIDOCAINE HYDROCHLORIDE 200 MG: 20 INJECTION, SOLUTION EPIDURAL; INFILTRATION; INTRACAUDAL; PERINEURAL at 16:06

## 2024-12-19 RX ADMIN — DROPERIDOL 1.25 MG: 2.5 INJECTION, SOLUTION INTRAMUSCULAR; INTRAVENOUS at 16:05

## 2024-12-19 RX ADMIN — NALOXONE HYDROCHLORIDE 0.2 MG: 0.4 INJECTION, SOLUTION INTRAMUSCULAR; INTRAVENOUS; SUBCUTANEOUS at 16:23

## 2024-12-19 NOTE — H&P
[facility]     Admission Diagnosis: M89.8x7    Admission Date: 12/19/2024    Patient Care Team:  Chey Craig APRN as PCP - General (Certified Clinical Nurse Specialist)  Ron Judd MD as Consulting Physician (Gastroenterology)  Landon Denson MD as Cardiologist (Cardiology)  Trudy Pierre APRN as Nurse Practitioner (Family Medicine)  Elma Casillas APRN as Nurse Practitioner (Nurse Practitioner)      Subjective .     Chief complaint/History of present illness: This is a 69-year-old female presents today complaining of left foot pain.  She complains of a painful knot on the inside of her left great toe.  She has tried padding and shoe gear changes without success.    Review of Systems  Review of Systems   Constitutional: Negative.    HENT: Negative.     Eyes: Negative.    Respiratory: Negative.     Cardiovascular: Negative.    Gastrointestinal: Negative.    Endocrine: Negative.    Genitourinary: Negative.    Musculoskeletal: Negative.    Skin: Negative.    Allergic/Immunologic: Negative.    Neurological: Negative.    Psychiatric/Behavioral: Negative.         History  Past Medical History:   Diagnosis Date    Acute recurrent sinusitis 07/20/2020    Anxiety     Arrhythmia     Arthritis     Asthma     Atrial fibrillation     Bronchitis, chronic 1969    CHF (congestive heart failure)     Chronic gout 12/18/2024    CKD (chronic kidney disease) 12/11/2020    Claustrophobia     Clotting disorder     COPD (chronic obstructive pulmonary disease)     COPD (chronic obstructive pulmonary disease) 12/18/2024    Coronary artery disease     Coughing blood     Depression     Diabetes mellitus     Disease due to severe acute respiratory syndrome coronavirus 2 (SARS-CoV-2) 09/30/2020    Disease of thyroid gland     Encounter for screening colonoscopy 06/18/2014    Food in larynx causing asphyxiation, initial encounter     GERD (gastroesophageal reflux disease)     Hiatal hernia  2014    Hyperlipidemia     Hyperlipidemia 12/20/2020    Hypertension     Hyperthyroidism 05/27/2016    Hypothyroidism     Myocardial infarction 2020    Restless leg     Sinusitis 05/22/2021    Sleep apnea     Unstable angina 07/14/2023    Vitamin B deficiency     Vitamin D deficiency    ,   Past Surgical History:   Procedure Laterality Date    ARTHROSCOPY SHOULDER W/ OPEN ROTATOR CUFF REPAIR Right     CARDIAC CATHETERIZATION N/A 03/21/2023    Procedure: Left Heart Cath;  Surgeon: Landon Denson MD;  Location:  [facility];  Service: Cardiology;  Laterality: N/A;    CARDIAC CATHETERIZATION N/A 8/18/2023    Procedure: Percutaneous Coronary Intervention;  Surgeon: Landon Denson MD;  Location:  [facility];  Service: Cardiology;  Laterality: N/A;    CATARACT EXTRACTION Bilateral     FOOT NAVICULAR EXCISION OR BONE GRAFT Right     GASTRIC SLEEVE LAPAROSCOPIC      TUBAL ABDOMINAL LIGATION     ,   Family History   Problem Relation Age of Onset    Hypertension Mother     Heart disease Mother     Arrhythmia Mother     Asthma Mother     Cancer Mother     Thyroid disease Mother     Stroke Mother     Hypertension Father     Heart disease Father     Colon polyps Father     Prostate cancer Father     Cancer Father     Heart failure Father     Hypertension Sister     Heart attack Sister     Atrial fibrillation Sister     COPD Sister     Cancer Brother     Cancer Sister     Cancer Brother     Cancer Brother     Cancer Sister     Colon cancer Neg Hx    ,   Social History     Tobacco Use    Smoking status: Never    Smokeless tobacco: Never   Vaping Use    Vaping status: Never Used   Substance Use Topics    Alcohol use: Never    Drug use: Never   ,   Medications Prior to Admission   Medication Sig Dispense Refill Last Dose/Taking    ALBUTEROL IN Inhale.   More than a month    amiodarone (PACERONE) 100 MG tablet Take 1 tablet by mouth Daily.   12/18/2024 at  9:00 AM    apixaban (ELIQUIS) 5 MG tablet  tablet Take 1 tablet by mouth 2 (Two) Times a Day. 60 tablet 11 12/18/2024 at  9:00 AM    Aspirin Low Dose 81 MG EC tablet TAKE ONE TABLET DAILY 30 tablet 11 12/18/2024 at  9:00 AM    atorvastatin (LIPITOR) 40 MG tablet Take 2 tablets by mouth Every Night.   12/17/2024    B Complex Vitamins (VITAMIN B-COMPLEX PO) Take  by mouth Daily.   12/18/2024 at  9:00 AM    CALCIUM PO Take  by mouth Daily.   12/18/2024 at  9:00 AM    carvedilol (COREG) 3.125 MG tablet Take 1 tablet by mouth 2 (Two) Times a Day With Meals. 180 tablet 3 12/18/2024 at  9:00 AM    celecoxib (CeleBREX) 100 MG capsule Take 1 capsule by mouth Daily.   12/18/2024 at  9:00 AM    Cholecalciferol (VITAMIN D3 PO) Take  by mouth.       Entresto 24-26 MG tablet TAKE 1 TABLET BY MOUTH 2 (TWO) TIMES A DAY. 60 tablet 11     ezetimibe (ZETIA) 10 MG tablet Take 1 tablet by mouth Daily.   12/18/2024 at  9:00 AM    ferrous sulfate 325 (65 FE) MG tablet Take 1 tablet by mouth As Needed.   12/18/2024 at  9:00 AM    furosemide (LASIX) 20 MG tablet Take 1 tablet by mouth As Needed.   12/15/2024    gabapentin (NEURONTIN) 300 MG capsule Take 1 capsule by mouth 3 (Three) Times a Day.   12/18/2024 at  9:00 AM    HumaLOG KwikPen 100 UNIT/ML solution pen-injector    12/18/2024 at  9:00 AM    HYDROcodone-acetaminophen (NORCO)  MG per tablet Take 1 tablet by mouth Every 6 (Six) Hours As Needed for Moderate Pain.   12/18/2024 at  9:00 AM    isosorbide mononitrate (IMDUR) 120 MG 24 hr tablet Take 1 tablet by mouth Daily. 90 tablet 3 12/18/2024 at  9:00 AM    Lantus 100 UNIT/ML injection INJECT 80 UNITS SUBCUTANEOUS TWICE DAILY   12/18/2024 at  9:00 AM    levothyroxine (SYNTHROID, LEVOTHROID) 88 MCG tablet Take 1 tablet by mouth Daily.   12/18/2024 at  9:00 AM    metFORMIN (GLUCOPHAGE) 1000 MG tablet Take 1 tablet by mouth 2 (Two) Times a Day With Meals.   12/15/2024    Mounjaro 7.5 MG/0.5ML solution pen-injector 1 (One) Time Per Week.       nitroglycerin (NITROSTAT) 0.3 MG  SL tablet Place 1 tablet under the tongue Every 5 (Five) Minutes As Needed for Chest Pain. Take no more than 3 doses in 15 minutes.   Past Month    nystatin (MYCOSTATIN) 401975 UNIT/GM cream Apply 1 Application topically to the appropriate area as directed 2 (Two) Times a Day.   More than a month    nystatin (MYCOSTATIN) 013025 UNIT/GM powder Apply  topically to the appropriate area as directed 2 (Two) Times a Day.   12/19/2024 at  8:30 AM    oxybutynin (DITROPAN) 5 MG tablet Take 1 tablet by mouth 1 (One) Time As Needed.   More than a month    pantoprazole (PROTONIX) 40 MG EC tablet Take 1 tablet by mouth Daily.   12/18/2024 at  9:00 AM    sertraline (ZOLOFT) 25 MG tablet Take 1 tablet by mouth Daily.   Past Month    spironolactone (ALDACTONE) 25 MG tablet TAKE ONE TABLET DAILY 90 tablet 3 12/18/2024 at  9:00 AM    vitamin D (ERGOCALCIFEROL) 1.25 MG (37212 UT) capsule capsule Take 1 capsule by mouth 2 (Two) Times a Week.   12/15/2024    and Allergies:  Isosorb dinitrate-hydralazine, Niacin, and Tamiflu [oseltamivir]    Objective     Vital Signs   Temp:  [97.8 °F (36.6 °C)] 97.8 °F (36.6 °C)  Heart Rate:  [83] 83  Resp:  [18] 18  BP: (140)/(63) 140/63    Physical Exam:  Physical Exam  Vitals and nursing note reviewed.   Constitutional:       Appearance: Normal appearance.   HENT:      Head: Normocephalic and atraumatic.      Nose: Nose normal.      Mouth/Throat:      Mouth: Mucous membranes are moist. Mucous membranes are dry.   Eyes:      Extraocular Movements: Extraocular movements intact.      Pupils: Pupils are equal, round, and reactive to light.   Cardiovascular:      Rate and Rhythm: Normal rate.      Pulses: Normal pulses.   Pulmonary:      Effort: Pulmonary effort is normal.   Abdominal:      General: Abdomen is flat.      Palpations: Abdomen is soft.   Musculoskeletal:         General: Swelling, tenderness and deformity present.   Skin:     General: Skin is warm and dry.      Capillary Refill: Capillary  refill takes 2 to 3 seconds.   Neurological:      General: No focal deficit present.      Mental Status: She is alert and oriented to person, place, and time.   Psychiatric:         Mood and Affect: Mood normal.         Behavior: Behavior normal.         Thought Content: Thought content normal.         Judgment: Judgment normal.         Results Review:  Lab Results (last 24 hours)       Procedure Component Value Units Date/Time    Basic Metabolic Panel [[id number]]  (Abnormal) Collected: 12/19/24 1215    Specimen: Blood Updated: 12/19/24 1253     Glucose 187 mg/dL      BUN 8 mg/dL      Creatinine 0.95 mg/dL      Sodium 143 mmol/L      Potassium 3.8 mmol/L      Chloride 107 mmol/L      CO2 26.0 mmol/L      Calcium 9.1 mg/dL      BUN/Creatinine Ratio 8.4     Anion Gap 10.0 mmol/L      eGFR 64.2 mL/min/1.73     Narrative:      GFR Categories in Chronic Kidney Disease (CKD)      GFR Category          GFR (mL/min/1.73)    Interpretation  G1                     90 or greater         Normal or high (1)  G2                      60-89                Mild decrease (1)  G3a                   45-59                Mild to moderate decrease  G3b                   30-44                Moderate to severe decrease  G4                    15-29                Severe decrease  G5                    14 or less           Kidney failure          (1)In the absence of evidence of kidney disease, neither GFR category G1 or G2 fulfill the criteria for CKD.    eGFR calculation 2021 CKD-EPI creatinine equation, which does not include race as a factor    CBC (No Diff) [[id number]]  (Abnormal) Collected: 12/19/24 1215    Specimen: Blood Updated: 12/19/24 1234     WBC 7.82 10*3/mm3      RBC 4.07 10*6/mm3      Hemoglobin 11.3 g/dL      Hematocrit 35.9 %      MCV 88.2 fL      MCH 27.8 pg      MCHC 31.5 g/dL      RDW 13.7 %      RDW-SD 44.1 fl      MPV 9.2 fL      Platelets 203 10*3/mm3     POC Glucose Once [[id number]]  (Abnormal) Collected: 12/19/24  1154    Specimen: Blood Updated: 12/19/24 1212     Glucose 188 mg/dL      Comment: : [id number] Rafael Nelson LMeter ID: [id number]                 Assessment & Plan     Exostosis left hallux  Left foot pain    Plan    I discussed the patient's findings and my recommendations with patient today.  Consent was signed for partial removal of bone left hallux.  Risks and benefits were discussed and reviewed.  She will be able to ambulate immediately in a postoperative shoe.    Bienvenido Lamb DPM  12/19/24  14:55 CST

## 2024-12-19 NOTE — ANESTHESIA POSTPROCEDURE EVALUATION
"Patient: Aida Benitez    Procedure Summary       Date: 12/19/24 Room / Location:  [facility] OR  /  [facility] OR    Anesthesia Start: 1605 Anesthesia Stop: 1639    Procedure: LEFT HALLUX PARTIAL REMOVAL OF BONE (Left: Toes) Diagnosis:       Exostosis of toe      Left foot pain      (Exostosis toe, left foot pain)    Surgeons: Bienvenido Lamb DPM Provider: Graham Acevedo CRNA    Anesthesia Type: MAC ASA Status: 4            Anesthesia Type: MAC    Vitals  Vitals Value Taken Time   BP     Temp     Pulse 101 12/19/24 1639   Resp     SpO2 96 % 12/19/24 1639   Vitals shown include unfiled device data.        Post Anesthesia Care and Evaluation    Patient location during evaluation: PACU  Patient participation: complete - patient participated  Level of consciousness: awake and alert  Pain management: adequate    Airway patency: patent  Anesthetic complications: No anesthetic complications    Cardiovascular status: acceptable  Respiratory status: acceptable  Hydration status: acceptable    Comments: Blood pressure 140/63, pulse 83, temperature 97.8 °F (36.6 °C), temperature source Temporal, resp. rate 18, height 151 cm (59.45\"), weight 103 kg (227 lb 11.8 oz), SpO2 96%.    Pt discharged from PACU based on mateus score >8    "

## 2024-12-19 NOTE — OP NOTE
TOE OSTEOTOMY  Procedure Note    Aida Benitez  12/19/2024    Pre-op Diagnosis:   Exostosis toe, left foot pain    Post-op Diagnosis:     Post-Op Diagnosis Codes:     * Exostosis of toe [M89.8X7]     * Left foot pain [M79.672]     Procedure/CPT Codes:       Procedure(s):  LEFT HALLUX PARTIAL REMOVAL OF BONE    Surgeon(s):  Bienvenido Lamb DPM    Anesthesia: Monitored Anesthesia Care with Regional    Staff:   Circulator: Facundo Fournier RN  Scrub Person: Ananth Gerardo; Diana Johnson        Indications for procedure:  Pain    Procedure details:  The patient was brought the operating room placed under general anesthesia.  Local anesthesia was achieved with a proximal hallux block with 20 cc 0.5% Naropin plain.  Left foot prepped and draped in usual sterile fashion.    Procedure #1 partial removal of bone medial aspect left hallux    Attention was then directed to the medial aspect left hallux.  2 converging Centyl incisions were placed surrounding a thick hyperkeratosis.  This was deepened to level of bone.  Prominent bone was resected from the medial aspect of the base of the proximal phalanx.  It was removed the operative site in toto.  Hand rasp was used to further remodeled the distal phalanx.  Wound was then irrigated.  Closure performed in layers.  Well-padded compression bandage applied.    Estimated Blood Loss: none    Specimens:                Specimens       ID Source Type Tests Collected By Collected At Frozen?    A Toe, Left Tissue TISSUE PATHOLOGY EXAM   Bienvenido Lamb DPM 12/19/24 1616     Description: LEFT HALLUX PARTIAL    This specimen was not marked as sent.              Drains: * No LDAs found *    Implants: Nothing was implanted during the procedure     Complications: none           Follow up:   He may weight-bear in surgical shoe.  Prescriptions for Norco and Zofran were given.    Bienvenido Lamb DPM     Date: 12/19/2024  Time: 16:25 CST

## 2024-12-19 NOTE — ANESTHESIA PROCEDURE NOTES
Airway  Urgency: elective    Date/Time: 12/19/2024 4:06 PM  Airway not difficult    General Information and Staff    Patient location during procedure: OR  CRNA/CAA: Graham Acevedo CRNA    Indications and Patient Condition  Indications for airway management: airway protection    Preoxygenated: yes  MILS maintained throughout  Mask difficulty assessment: 1 - vent by mask    Final Airway Details  Final airway type: endotracheal airway      Successful airway: ETT  Cuffed: yes   Successful intubation technique: direct laryngoscopy  Endotracheal tube insertion site: oral  Blade: Banegas  Blade size: 2  ETT size (mm): 7.5  Cormack-Lehane Classification: grade I - full view of glottis  Placement verified by: chest auscultation and capnometry   Cuff volume (mL): 5  Measured from: lips  ETT/EBT  to lips (cm): 20  Number of attempts at approach: 1  Assessment: lips, teeth, and gum same as pre-op and atraumatic intubation

## 2024-12-19 NOTE — ANESTHESIA PREPROCEDURE EVALUATION
Anesthesia Evaluation                  Airway   Dental      Pulmonary    (+) COPD, asthma,sleep apnea  Cardiovascular     PT is on anticoagulation therapy    (+) hypertension, past MI , CAD, dysrhythmias Atrial Fib, angina, CHF , MCNEILL, hyperlipidemia      Neuro/Psych  (+) numbness, psychiatric history  GI/Hepatic/Renal/Endo    (+) morbid obesity, hiatal hernia, GERD, renal disease-, diabetes mellitus, thyroid problem     Musculoskeletal     Abdominal    Substance History      OB/GYN          Other   blood dyscrasia,                       Anesthesia Plan    ASA 4     MAC     (Note: Left heart cath 8/18/23 revealed 70% stenosis of 2nd PDL that will be treated medically. Mid-LAD had heavy calcification with abnormal RFR, however balloon was unable to be inflated in that area.    Further heart (coronary and viability) eval pending )          CODE STATUS:

## 2024-12-20 ENCOUNTER — TELEPHONE (OUTPATIENT)
Age: 69
End: 2024-12-20

## 2024-12-20 NOTE — TELEPHONE ENCOUNTER
Spoke with patient and she stated the benadryl had helped with the itching finally. I encouraged her to take the benadryl daily as long as she was taking the pain medication to prevent her from itching.

## 2024-12-20 NOTE — TELEPHONE ENCOUNTER
Pt states she is itching all over. She as taken benadryl and that has not helped. She would like to speak with someone from the clinic.

## 2024-12-23 PROBLEM — J44.9 COPD (CHRONIC OBSTRUCTIVE PULMONARY DISEASE): Status: RESOLVED | Noted: 2024-12-18 | Resolved: 2024-12-23

## 2024-12-23 LAB
CYTO UR: NORMAL
LAB AP CASE REPORT: NORMAL
LAB AP DIAGNOSIS COMMENT: NORMAL
Lab: NORMAL
PATH REPORT.FINAL DX SPEC: NORMAL
PATH REPORT.GROSS SPEC: NORMAL

## 2024-12-30 ENCOUNTER — TELEPHONE (OUTPATIENT)
Age: 69
End: 2024-12-30

## 2025-01-02 ENCOUNTER — OFFICE VISIT (OUTPATIENT)
Age: 70
End: 2025-01-02

## 2025-01-02 VITALS — WEIGHT: 224 LBS | HEIGHT: 60 IN | BODY MASS INDEX: 43.98 KG/M2

## 2025-01-02 DIAGNOSIS — M89.8X7 EXOSTOSIS OF TOE: Primary | ICD-10-CM

## 2025-01-02 DIAGNOSIS — Z47.89 AFTERCARE FOLLOWING SURGERY OF THE MUSCULOSKELETAL SYSTEM: ICD-10-CM

## 2025-01-02 PROCEDURE — 99024 POSTOP FOLLOW-UP VISIT: CPT | Performed by: NURSE PRACTITIONER

## 2025-01-02 RX ORDER — VITAMIN B COMPLEX
1 CAPSULE ORAL DAILY
COMMUNITY

## 2025-01-02 RX ORDER — AMIODARONE HYDROCHLORIDE 100 MG/1
100 TABLET ORAL DAILY
COMMUNITY

## 2025-01-02 RX ORDER — MONTELUKAST SODIUM 10 MG/1
10 TABLET ORAL NIGHTLY
COMMUNITY

## 2025-01-02 RX ORDER — FUROSEMIDE 20 MG/1
20 TABLET ORAL PRN
COMMUNITY
Start: 2024-10-08

## 2025-01-02 RX ORDER — BUDESONIDE AND FORMOTEROL FUMARATE DIHYDRATE 160; 4.5 UG/1; UG/1
2 AEROSOL RESPIRATORY (INHALATION) 2 TIMES DAILY
COMMUNITY

## 2025-01-02 RX ORDER — SACUBITRIL AND VALSARTAN 24; 26 MG/1; MG/1
1 TABLET, FILM COATED ORAL 2 TIMES DAILY
COMMUNITY
Start: 2024-10-09

## 2025-01-02 RX ORDER — ERGOCALCIFEROL 1.25 MG/1
50000 CAPSULE, LIQUID FILLED ORAL
COMMUNITY

## 2025-01-02 RX ORDER — CARVEDILOL 3.12 MG/1
3.12 TABLET ORAL 2 TIMES DAILY WITH MEALS
COMMUNITY
Start: 2024-10-22

## 2025-01-02 RX ORDER — PANTOPRAZOLE SODIUM 40 MG/1
40 TABLET, DELAYED RELEASE ORAL DAILY
COMMUNITY

## 2025-01-02 RX ORDER — NYSTATIN 100000 [USP'U]/G
POWDER TOPICAL 2 TIMES DAILY
COMMUNITY

## 2025-01-02 RX ORDER — ASPIRIN 81 MG/1
81 TABLET ORAL DAILY
COMMUNITY
Start: 2024-03-15

## 2025-01-02 RX ORDER — INSULIN GLARGINE 100 [IU]/ML
80 INJECTION, SOLUTION SUBCUTANEOUS 2 TIMES DAILY
COMMUNITY
Start: 2024-09-09

## 2025-01-02 RX ORDER — NYSTATIN 100000 U/G
1 CREAM TOPICAL 2 TIMES DAILY
COMMUNITY

## 2025-01-02 RX ORDER — EZETIMIBE 10 MG/1
10 TABLET ORAL DAILY
COMMUNITY

## 2025-01-02 RX ORDER — FERROUS SULFATE 325(65) MG
325 TABLET ORAL PRN
COMMUNITY

## 2025-01-02 RX ORDER — ONDANSETRON 4 MG/1
4 TABLET, FILM COATED ORAL EVERY 4 HOURS
COMMUNITY
Start: 2024-12-19

## 2025-01-02 RX ORDER — HYDROCODONE BITARTRATE AND ACETAMINOPHEN 10; 325 MG/1; MG/1
1 TABLET ORAL EVERY 6 HOURS PRN
COMMUNITY

## 2025-01-02 RX ORDER — SERTRALINE HYDROCHLORIDE 25 MG/1
25 TABLET, FILM COATED ORAL DAILY
COMMUNITY

## 2025-01-02 RX ORDER — SPIRONOLACTONE 25 MG/1
25 TABLET ORAL DAILY
COMMUNITY
Start: 2024-07-09

## 2025-01-02 RX ORDER — ROPINIROLE 0.5 MG/1
0.5 TABLET, FILM COATED ORAL
COMMUNITY
Start: 2024-08-07

## 2025-01-02 RX ORDER — HYDROXYZINE HYDROCHLORIDE 25 MG/1
25 TABLET, FILM COATED ORAL 3 TIMES DAILY PRN
COMMUNITY

## 2025-01-02 RX ORDER — PITAVASTATIN CALCIUM 2.09 MG/1
TABLET, FILM COATED ORAL
COMMUNITY

## 2025-01-02 RX ORDER — INSULIN LISPRO 100 [IU]/ML
INJECTION, SOLUTION INTRAVENOUS; SUBCUTANEOUS
COMMUNITY
Start: 2024-07-08

## 2025-01-02 RX ORDER — ATORVASTATIN CALCIUM 40 MG/1
80 TABLET, FILM COATED ORAL NIGHTLY
COMMUNITY

## 2025-01-02 RX ORDER — CELECOXIB 100 MG/1
100 CAPSULE ORAL DAILY
COMMUNITY

## 2025-01-02 RX ORDER — MOMETASONE FUROATE MONOHYDRATE 50 UG/1
SPRAY, METERED NASAL
COMMUNITY

## 2025-01-02 RX ORDER — OMEPRAZOLE 40 MG/1
40 CAPSULE, DELAYED RELEASE ORAL DAILY
COMMUNITY

## 2025-01-02 RX ORDER — NITROGLYCERIN 0.3 MG/1
0.3 TABLET SUBLINGUAL
COMMUNITY

## 2025-01-02 NOTE — PROGRESS NOTES
[facility]  [facility]  [address]  [facility] [id number] [phone]     Patient: Magdalena Mayes   YOB: 1955   Date: 1/2/2025   Visit Type:  Post op    Body Part: Foot left    What was the date of surgery? 12/19/24    Pain medication refill? No  
Inhale 2 puffs into the lungs 2 times daily      Calcium Carb-Cholecalciferol (OYSTER SHELL CALCIUM W/D) 500-5 MG-MCG TABS tablet       celecoxib (CELEBREX) 100 MG capsule Take 1 capsule by mouth daily      vitamin D (ERGOCALCIFEROL) 1.25 MG (51825 UT) CAPS capsule Take 1 capsule by mouth Twice a Week      ezetimibe (ZETIA) 10 MG tablet Take 1 tablet by mouth daily      ferrous sulfate (IRON 325) 325 (65 Fe) MG tablet Take 1 tablet by mouth as needed      HYDROcodone-acetaminophen (NORCO)  MG per tablet Take 1 tablet by mouth every 6 hours as needed. Max Daily Amount: 4 tablets      hydrOXYzine HCl (ATARAX) 25 MG tablet Take 1 tablet by mouth 3 times daily as needed      mometasone (NASONEX) 50 MCG/ACT nasal spray       montelukast (SINGULAIR) 10 MG tablet Take 1 tablet by mouth nightly      nitroGLYCERIN (NITROSTAT) 0.3 MG SL tablet Place 1 tablet under the tongue      nystatin (MYCOSTATIN) 804995 UNIT/GM cream Apply 1 Application topically 2 times daily      nystatin (MYCOSTATIN) 197790 UNIT/GM powder Apply topically 2 times daily      omeprazole (PRILOSEC) 40 MG delayed release capsule Take 1 capsule by mouth daily      pantoprazole (PROTONIX) 40 MG tablet Take 1 tablet by mouth daily      pitavastatin (LIVALO) 2 MG TABS tablet       sertraline (ZOLOFT) 25 MG tablet Take 1 tablet by mouth daily      sitagliptan (JANUVIA) 100 MG tablet Take 100 mg by mouth daily.   (Patient not taking: Reported on 1/2/2025)      fish oil-omega-3 fatty acids 1000 MG capsule Take 2 g by mouth daily.   (Patient not taking: Reported on 1/2/2025)       No current facility-administered medications for this visit.        Allergies  Allergies   Allergen Reactions    Etanercept Rash and Other (See Comments)     -     -    Isosorb Dinitrate-Hydralazine Shortness Of Breath    Isosorbide Other (See Comments)     Other reaction(s): breathing problems    -     -     -    Leflunomide Shortness Of Breath and Other (See Comments)     Other

## 2025-01-09 DIAGNOSIS — Z47.89 AFTERCARE FOLLOWING SURGERY OF THE MUSCULOSKELETAL SYSTEM: Primary | ICD-10-CM

## 2025-01-09 RX ORDER — HYDROCODONE BITARTRATE AND ACETAMINOPHEN 5; 325 MG/1; MG/1
1 TABLET ORAL EVERY 8 HOURS PRN
Qty: 10 TABLET | Refills: 0 | Status: CANCELLED | OUTPATIENT
Start: 2025-01-09 | End: 2025-01-12

## 2025-01-09 RX ORDER — ALLOPURINOL 100 MG/1
100 TABLET ORAL DAILY
Qty: 30 TABLET | Refills: 0 | Status: CANCELLED | OUTPATIENT
Start: 2025-01-09

## 2025-01-09 NOTE — TELEPHONE ENCOUNTER
PT wants to Dr. 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 Francois would give her anything for the pain

## 2025-01-10 ENCOUNTER — TELEPHONE (OUTPATIENT)
Age: 70
End: 2025-01-10

## 2025-01-10 RX ORDER — HYDROCODONE BITARTRATE AND ACETAMINOPHEN 5; 325 MG/1; MG/1
1 TABLET ORAL EVERY 8 HOURS PRN
Qty: 10 TABLET | Refills: 0 | Status: SHIPPED | OUTPATIENT
Start: 2025-01-10 | End: 2025-01-13

## 2025-01-14 ENCOUNTER — NURSE ONLY (OUTPATIENT)
Age: 70
End: 2025-01-14

## 2025-01-14 NOTE — PROGRESS NOTES
"[facility] GASTROENTEROLOGY - OFFICE NOTE    5/2/2023    Aida Benitez   1955    Primary Physician: Lee Romero MD    Chief Complaint   Patient presents with   • Difficulty Swallowing         HISTORY OF PRESENT ILLNESS:     Aida Benitez is a 67 y.o. female presents  with dysphagia.           Dysphagia: Patient complains of difficulty swallowing. This started 1 year ago.  The patient points to the neck area where this occurs. This is noted with solid foods and pills. Has had to regurgitate to get relief. Taking protonix daily with good control. No n/v. No reflux symptoms. No hematemesis.          She has history of gastric sleeve 2014.   EGD and colonoscopy 8/2019 by Dr. Edson Blood.       She is on eliquis. Dr. Denson.       =======================================================================  Office visit  10-25-22 HPI  Aida BENITEZ is a 67 y.o. female presents with dysphagia. This started 6 mo ago. She points to the neck area where meds and solid foods will hang. Taking a drink does not help. Has had to cough to get relief.  Takes protonix daily that does control reflux. No weight loss. No hematemesis.   No fever.      She is on eliquis. Prescribed by Chey Catherine NP.     She has history gastric sleeve 2014.         Last egd was around 3 year ago done in [place]. That was \" ok\" then.   Last colonoscopy 3 year ago in [place] and was \" ok \" then. She has had colon polyps in the past. Recommended repeat colonoscopy 10 year.   No family history of colon cancer.   Father had colon polyps.           Past Medical History:   Diagnosis Date   • Anxiety    • Arrhythmia    • Arthritis    • Asthma    • Atrial fibrillation    • Bronchitis, chronic 1969   • CHF (congestive heart failure)    • Claustrophobia    • Clotting disorder    • COPD (chronic obstructive pulmonary disease)    • Coronary artery disease    • Coughing blood    • Depression    • Diabetes mellitus    • Disease of thyroid gland    • " [facility]  DIVISION OF OTOLARYNGOLOGY- HEAD & NECK SURGERY  CONSULT      Yuri Garcia (:  1978) is a 46 y.o. male, here for evaluation of the following chief complaint(s):  Fracture (Mandibular )      ASSESSMENT/PLAN:  1. Closed fracture of left side of mandibular body, initial encounter  -     CT MAXILLOFACIAL WO CONTRAST; Future  -     oxyCODONE-acetaminophen (PERCOCET) 5-325 MG per tablet; Take 1 tablet by mouth every 6 hours as needed for Pain for up to 3 days. Intended supply: 3 days. Take lowest dose possible to manage pain Max Daily Amount: 4 tablets, Disp-10 tablet, R-0Normal  2. Facial pain  -     oxyCODONE-acetaminophen (PERCOCET) 5-325 MG per tablet; Take 1 tablet by mouth every 6 hours as needed for Pain for up to 3 days. Intended supply: 3 days. Take lowest dose possible to manage pain Max Daily Amount: 4 tablets, Disp-10 tablet, R-0Normal  3. Facial injury, initial encounter  -     oxyCODONE-acetaminophen (PERCOCET) 5-325 MG per tablet; Take 1 tablet by mouth every 6 hours as needed for Pain for up to 3 days. Intended supply: 3 days. Take lowest dose possible to manage pain Max Daily Amount: 4 tablets, Disp-10 tablet, R-0Normal      This is a very pleasant 46 y.o. male here today for evaluation of the the above-noted complaints.      I dependently reviewed and interpreted the patient's CT maxillofacial scan that I ordered after his visit was completed.  He has evidence of a right nondisplaced ramus fracture as well as a nondisplaced left mandibular body fracture.  We discussed treatment options at length including conservative management with a soft diet versus open reduction internal fixation.  The patient is interested in proceeding with surgery, and understands the risks.    Risks and benefits of the above procedure include pain, bleeding, dysphagia, infection-including plate extrusion or the need for plate removal, malunion/nonunion of fractures, hemorrhage, dysgeusia/decreased  Food in larynx causing asphyxiation, initial encounter    • GERD (gastroesophageal reflux disease)    • Hiatal hernia 2014   • Hyperlipidemia    • Hypertension    • Hypothyroidism    • Myocardial infarction    • Restless leg    • Sleep apnea    • Vitamin B deficiency    • Vitamin D deficiency        Past Surgical History:   Procedure Laterality Date   • ARTHROSCOPY SHOULDER W/ OPEN ROTATOR CUFF REPAIR Right    • CARDIAC CATHETERIZATION N/A 03/21/2023    Procedure: Left Heart Cath;  Surgeon: Landon Denson MD;  Location:  [facility] CATH INVASIVE LOCATION;  Service: Cardiology;  Laterality: N/A;   • CATARACT EXTRACTION Bilateral    • FOOT NAVICULAR EXCISION OR BONE GRAFT Right    • GASTRIC SLEEVE LAPAROSCOPIC     • TUBAL ABDOMINAL LIGATION         Outpatient Medications Marked as Taking for the 5/2/23 encounter (Office Visit) with Alma Richard APRN   Medication Sig Dispense Refill   • ALBUTEROL IN Inhale.     • amiodarone (PACERONE) 100 MG tablet Take 1 tablet by mouth Daily.     • apixaban (ELIQUIS) 5 MG tablet tablet Take 1 tablet by mouth 2 (Two) Times a Day. 60 tablet 11   • aspirin 81 MG EC tablet Take 1 tablet by mouth Daily. 30 tablet 11   • atorvastatin (LIPITOR) 40 MG tablet Take 2 tablets by mouth Every Night.     • B Complex Vitamins (VITAMIN B-COMPLEX PO) Take  by mouth Daily.     • budesonide-formoterol (SYMBICORT) 160-4.5 MCG/ACT inhaler Inhale 2 puffs 2 (Two) Times a Day.     • CALCIUM PO Take  by mouth Daily.     • carvedilol (COREG) 25 MG tablet Take 1 tablet by mouth 2 (Two) Times a Day With Meals.     • Cholecalciferol (VITAMIN D3 PO) Take  by mouth.     • DULoxetine (CYMBALTA) 30 MG capsule Take 1 capsule by mouth Daily.     • Entresto 24-26 MG tablet TAKE 1 TABLET BY MOUTH TWICE DAILY 60 tablet 11   • ezetimibe (ZETIA) 10 MG tablet Take 1 tablet by mouth Daily.     • ferrous sulfate 325 (65 FE) MG tablet Take 1 tablet by mouth Daily With Breakfast.     • furosemide (LASIX) 20 MG tablet Take 1 tablet  by mouth Daily.     • gabapentin (NEURONTIN) 300 MG capsule Take 1 capsule by mouth 3 (Three) Times a Day.     • HYDROcodone-acetaminophen (NORCO)  MG per tablet Take 1 tablet by mouth Every 6 (Six) Hours As Needed for Moderate Pain.     • isosorbide mononitrate (IMDUR) 30 MG 24 hr tablet Take 1 tablet by mouth Daily.     • levothyroxine (SYNTHROID, LEVOTHROID) 88 MCG tablet Take 1 tablet by mouth Daily.     • metFORMIN (GLUCOPHAGE) 1000 MG tablet Take 1 tablet by mouth 2 (Two) Times a Day With Meals.     • nitroglycerin (NITROSTAT) 0.3 MG SL tablet Place 1 tablet under the tongue Every 5 (Five) Minutes As Needed for Chest Pain. Take no more than 3 doses in 15 minutes.     • nystatin (MYCOSTATIN) 617872 UNIT/GM cream Apply 1 application topically to the appropriate area as directed 2 (Two) Times a Day.     • nystatin (MYCOSTATIN) 013048 UNIT/GM powder Apply  topically to the appropriate area as directed 2 (Two) Times a Day.     • oxybutynin (DITROPAN) 5 MG tablet Take 1 tablet by mouth 1 (One) Time As Needed.     • pantoprazole (PROTONIX) 40 MG EC tablet Take 1 tablet by mouth Daily.     • rOPINIRole (REQUIP) 0.5 MG tablet Take 1 tablet by mouth Every Night. Take 1 hour before bedtime.     • spironolactone (ALDACTONE) 25 MG tablet Take 1 tablet by mouth Daily. 30 tablet 11   • vitamin D (ERGOCALCIFEROL) 1.25 MG (61030 UT) capsule capsule Take 1 capsule by mouth 2 (Two) Times a Week.         Allergies   Allergen Reactions   • Isosorb Dinitrate-Hydralazine Shortness Of Breath   • Niacin Shortness Of Breath and Unknown (See Comments)     Other reaction(s): airway   -    -          Social History     Socioeconomic History   • Marital status:    Tobacco Use   • Smoking status: Never   • Smokeless tobacco: Never   Vaping Use   • Vaping Use: Never used   Substance and Sexual Activity   • Alcohol use: Never   • Drug use: Never   • Sexual activity: Not Currently     Partners: Male     Birth control/protection:  "None       Family History   Problem Relation Age of Onset   • Hypertension Mother    • Heart disease Mother    • Arrhythmia Mother    • Asthma Mother    • Cancer Mother    • Thyroid disease Mother    • Stroke Mother    • Hypertension Father    • Heart disease Father    • Colon polyps Father    • Prostate cancer Father    • Cancer Father    • Heart failure Father    • Hypertension Sister    • Heart attack Sister    • Atrial fibrillation Sister    • COPD Sister    • Cancer Brother    • Cancer Sister    • Cancer Brother    • Cancer Brother    • Cancer Sister    • Colon cancer Neg Hx        Review of Systems   Constitutional: Negative for chills, fever and unexpected weight change.   HENT: Positive for trouble swallowing.    Respiratory: Negative for shortness of breath.    Cardiovascular: Negative for chest pain.   Gastrointestinal: Negative for abdominal distention, abdominal pain, anal bleeding, blood in stool, constipation, diarrhea, nausea and vomiting.        Vitals:    05/02/23 0900   BP: 146/62   Pulse: 71   Temp: 95.1 °F (35.1 °C)   SpO2: 96%   Weight: 109 kg (240 lb)   Height: 152.4 cm (60\")      Body mass index is 46.87 kg/m².    Physical Exam  Vitals reviewed.   Constitutional:       General: She is not in acute distress.  Cardiovascular:      Rate and Rhythm: Normal rate and regular rhythm.      Heart sounds: Normal heart sounds.   Pulmonary:      Effort: Pulmonary effort is normal.      Breath sounds: Normal breath sounds.   Abdominal:      General: Bowel sounds are normal. There is no distension.      Palpations: Abdomen is soft.      Tenderness: There is no abdominal tenderness.   Skin:     General: Skin is warm and dry.   Neurological:      Mental Status: She is alert.         Results for orders placed or performed during the hospital encounter of 03/21/23   CBC (No Diff)    Specimen: Blood   Result Value Ref Range    WBC 9.77 3.40 - 10.80 10*3/mm3    RBC 4.41 3.77 - 5.28 10*6/mm3    Hemoglobin 12.7 " 12.0 - 15.9 g/dL    Hematocrit 39.2 34.0 - 46.6 %    MCV 88.9 79.0 - 97.0 fL    MCH 28.8 26.6 - 33.0 pg    MCHC 32.4 31.5 - 35.7 g/dL    RDW 13.2 12.3 - 15.4 %    RDW-SD 43.0 37.0 - 54.0 fl    MPV 10.1 6.0 - 12.0 fL    Platelets 233 140 - 450 10*3/mm3   Comprehensive Metabolic Panel    Specimen: Blood   Result Value Ref Range    Glucose 205 (H) 65 - 99 mg/dL    BUN 12 8 - 23 mg/dL    Creatinine 0.86 0.57 - 1.00 mg/dL    Sodium 137 136 - 145 mmol/L    Potassium 4.1 3.5 - 5.2 mmol/L    Chloride 99 98 - 107 mmol/L    CO2 25.0 22.0 - 29.0 mmol/L    Calcium 8.8 8.6 - 10.5 mg/dL    Total Protein 7.1 6.0 - 8.5 g/dL    Albumin 4.0 3.5 - 5.2 g/dL    ALT (SGPT) 12 1 - 33 U/L    AST (SGOT) 22 1 - 32 U/L    Alkaline Phosphatase 103 39 - 117 U/L    Total Bilirubin 0.5 0.0 - 1.2 mg/dL    Globulin 3.1 gm/dL    A/G Ratio 1.3 g/dL    BUN/Creatinine Ratio 14.0 7.0 - 25.0    Anion Gap 13.0 5.0 - 15.0 mmol/L    eGFR 73.2 >60.0 mL/min/1.73           ASSESSMENT AND PLAN    Assessment & Plan     Diagnoses and all orders for this visit:    1. Pharyngoesophageal dysphagia (Primary)    2. Coagulopathy  Comments:  eliquis.           In regards to dysphagia, differential diagnosis discussed.  I recommend cut food in small pieces and chew well.  I recommend upper endoscopy for further evaluation and the patient is agreeable.       However prior to scheduling I will send a letter to Dr. Denson in regards to if the patient can safely hold eliquis 48 hours  prior to procedure. I will contact patient once he has responded.  I have instructed the patient to contact our office if she has not heard from us within 2 weeks.      The patient was advised on the risks of stopping blood thinners for a procedure.  The risks discussed included the risk of developing myocardial infarction, CVA, embolus, clogging of the arteries or stents, etc.  We discussed the potential consequences of the risks discussed.  The benefits of stopping as well as the alternatives  were discussed as well.   Patient is to hold their anticoagulation medication per the direction of their prescribing physician, Dr. Denson.  A letter will be sent to prescribing This is to prevent any risk or complication from bleeding intra and post procedure. If they develop bleeding post procedure they are to go the emergency department for further evaluation and treatment immediately.             EGD  Risk, benefits, and alternatives of endoscopy were explained in full.  They understand that there is a risk of bleeding, perforation, and infection.  The risk of perforation goes up with esophageal dilation.  Other options to evaluate UGI complaints could involve barium swallow or UGI series, but these would be diagnostic tests only.  Patient was given time to ask questions.  I answered them to their satisfaction and they are agreeable to proceeding                      There are no Patient Instructions on file for this visit.      CHAIM Leyva    EMR Dragon/transcription disclaimer:  Much of this encounter note is electronic transcription/translation of spoken language to printed text.  The electronic translation of spoken language may be erroneous, or at times, nonsensical words or phrases may be inadvertently transcribed.  Although I have reviewed the note for such errors, some may still exist.

## 2025-01-24 ENCOUNTER — TELEPHONE (OUTPATIENT)
Dept: PULMONOLOGY | Facility: CLINIC | Age: 70
End: 2025-01-24
Payer: MEDICARE

## 2025-01-24 NOTE — TELEPHONE ENCOUNTER
Called patient to reschedule their no show appointment that was originally scheduled on 01/13/2025 .      Patient's appointment has been rescheduled.   [facility] No Show Policy was discussed and understood per patient.

## 2025-02-20 NOTE — PROGRESS NOTES
CHAIM Bettencourt  [facility]   Pulmonary and Critical Care  [address] [place] [address]  Phone: [phone]  Fax: [phone]           Chief Complaint  Shortness of Breath    Subjective        Aida REJI Benitez presents to [facility] PULMONARY & CRITICAL CARE MEDICINE     History of Present Illness  Ms Benitez is a 69 year old female patient referred by PCP for shortness of breath. She has known COPD, bronchiectasis, atrial fibrillation, CAD, diastolic heart failure,anxiety, depression, diabetes, GERD,thyroid disease,rheumatoid arthritis, sarcoidosis, hypertension, vitamin B deficiency, vitamin D deficiency,  obesity and hx of covid.     She uses Symbicort 2 puffs twice a day and albuterol rescue inhaler 3 to 4 times a week. She also uses Flonase nasal spray regularly for nasal drainage, which she finds helpful. She experiences daily shortness of breath, which varies in severity, but generally remains stable. She finds relief from Symbicort and the rescue inhaler. She occasionally experiences wheezing, with the most recent episode occurring a week ago. She does not use oxygen every night, but occasionally uses it during the day when sitting, which she finds beneficial. She has 2 pulse oximeters to monitor her oxygen levels. She owns an air purifier. She reports no fevers, chills, or night sweats. She had pneumonia in her youth and was exposed to secondhand smoke from her  and daughter.    She has atrial fibrillation in 10/2023 and has not taken amiodarone for over a year due to difficulty swallowing the large tablet.  She is currently on Eliquis for the atrial fibs as well.  She had clotting disorder listed in her history but she notes she was never told that.    She has a history of sleep apnea and used a CPAP machine, which she discontinued over a year ago. She underwent a sleep study approximately 4 to 5 [place].  She has lost weight,  dropping from 255 to 224 pounds.     She was diagnosed with rheumatoid arthritis at the age of 23 and is under the care of Dr. Dioni Alston. She has not been on Plaquenil for approximately 10 to 15 years. She experiences pain in her fingertips. She has previously taken methotrexate but discontinued it due to side effects.  He also had listed in her history sarcoid however she states she was never told she had sarcoid either.  She just recently saw her rheumatologist and states they are starting her on a medication for which is supposed to be delivered Friday.  She is going to call us back and let us know which medication that is.    She follows with [facility] cardiology.  A heart cath was attempted in August 2023 but unsuccessful.  She was advised that due to her high risk status, the procedure could not be performed here locally.  She she was then sent to a cardiologist in [place]. She was scheduled for a cardiac PET scan and echocardiogram on 02/24/2024, but these were postponed due to inclement weather and her inability to drive due to arthritis in her hands.  Her studies have been rescheduled till April.    She takes levothyroxine 75 mcg for thyroid management.  She notes that her levels have been stable.  She has a growth of three-quarters of her thyroid gland, but treatment is not an option due to her inability to tolerate anesthesia.    She is on Protonix for acid reflux, which she reports is well-controlled. She avoids eating or drinking 2 to 3 hours before bedtime.    Supplemental Information  She has a history of COVID-19. She underwent a sleeve gastrectomy, during which a hernia was also repaired.     SOCIAL HISTORY  She has never smoked, does not consume alcohol, use drugs, or vape. She was a  for 25 years. She lives in [place] with her daughter.    She was seen in 2021 by .  She also was seen in the past by  in [place] for her COPD.  Her pulmonary function  "study done in 2023 at [place] showed moderate restriction.        Objective   Vital Signs:   /54   Pulse 84   Ht 151 cm (59.45\")   Wt 105 kg (232 lb 6.4 oz)   SpO2 94% Comment: RA  BMI 46.23 kg/m²     Physical Exam  Vitals reviewed.   Constitutional:       Appearance: Normal appearance. She is morbidly obese.   Cardiovascular:      Rate and Rhythm: Normal rate and regular rhythm.   Pulmonary:      Effort: Pulmonary effort is normal.      Breath sounds: Normal breath sounds.   Musculoskeletal:      Comments: Joint abnormalities on both hands  Ambulates with a cane   Neurological:      General: No focal deficit present.      Mental Status: She is alert and oriented to person, place, and time.   Psychiatric:         Mood and Affect: Mood normal.         Behavior: Behavior normal.            Result Review :  The following data was reviewed by: CHAIM Bettencourt on 02/26/2025:    My interpretation of imaging: None  My interpretation of labs: None      My interpretation of the PFT : As above    No results found for this or any previous visit.    Rest/Exercise Pulse Ox Values          2/26/2025    13:45   Rest/Exercise Pulse Ox Results   Rest room air SAT % 97   Exercise room air SAT % 96         Assessment and Plan   Diagnoses and all orders for this visit:    1. Shortness of breath (Primary)  -     Walking Oximetry  -     Complete PFT - Pre & Post Bronchodilator; Future    2. Rheumatoid arthritis involving multiple sites, unspecified whether rheumatoid factor present    3. Restrictive lung disease  -     Complete PFT - Pre & Post Bronchodilator; Future    4. H/O amiodarone therapy    5. H/O methotrexate therapy    6. Hyperthyroidism    7. Nocturnal hypoxia  -     Complete PFT - Pre & Post Bronchodilator; Future  -     Overnight Sleep Oximetry Study; Future    8. Obstructive sleep apnea syndrome        Reviewed past medical history with patient as well as her extensive family history.  She is a never " smoker.  She does have secondhand exposure.  Limited PFT done in 2023 at [facility] showed moderate restriction.  She is agreeable to have a complete pre and post pulmonary function study to be done at the hospital.  She was ambulated in the office today and did not qualify for daytime supplemental oxygen.  She is agreeable to have an overnight done to assess her nocturnal hypoxia as well as to review her HECTOR.  We discussed the possibility of needing an updated sleep study and resumption of CPAP.  We will see what her HECTOR shows.  Patient has morbid obesity, rheumatoid arthritis, history of amiodarone and methotrexate use.  We discussed causes for restrictive lung disease to include but not limited to obesity, pulmonary fibrosis, elevated diaphragm.  Once we have her overnight and her pulmonary function study if we have confirmation of restrictive lung disease then we will pursue high-res CT.  In the meantime she is to continue her Symbicort and her as needed albuterol HFA.  She is going to contact our office once she is certain of what new medication her rheumatologist is starting her on.  I have asked her to return in 4 weeks.             Follow Up   Return in about 4 weeks (around 3/26/2025) for PFT, Overnight.  Patient was given instructions and counseling regarding her condition or for health maintenance advice. Please see specific information pulled into the AVS if appropriate.     CHAIM Bettencourt  2/26/2025  18:29 CST    Please note that portions of this note were completed with a voice recognition program.    Patient or patient representative verbalized consent for the use of Ambient Listening during the visit with  CHAIM Bettencourt for chart documentation. 2/26/2025  18:25 CST

## 2025-02-26 ENCOUNTER — OFFICE VISIT (OUTPATIENT)
Dept: PULMONOLOGY | Facility: CLINIC | Age: 70
End: 2025-02-26
Payer: MEDICARE

## 2025-02-26 VITALS
BODY MASS INDEX: 46.85 KG/M2 | OXYGEN SATURATION: 94 % | DIASTOLIC BLOOD PRESSURE: 54 MMHG | HEIGHT: 59 IN | WEIGHT: 232.4 LBS | HEART RATE: 84 BPM | SYSTOLIC BLOOD PRESSURE: 100 MMHG

## 2025-02-26 DIAGNOSIS — E05.90 HYPERTHYROIDISM: ICD-10-CM

## 2025-02-26 DIAGNOSIS — Z92.29 H/O AMIODARONE THERAPY: ICD-10-CM

## 2025-02-26 DIAGNOSIS — Z92.21 H/O METHOTREXATE THERAPY: ICD-10-CM

## 2025-02-26 DIAGNOSIS — G47.34 NOCTURNAL HYPOXIA: ICD-10-CM

## 2025-02-26 DIAGNOSIS — M06.9 RHEUMATOID ARTHRITIS INVOLVING MULTIPLE SITES, UNSPECIFIED WHETHER RHEUMATOID FACTOR PRESENT: ICD-10-CM

## 2025-02-26 DIAGNOSIS — G47.33 OBSTRUCTIVE SLEEP APNEA SYNDROME: ICD-10-CM

## 2025-02-26 DIAGNOSIS — R06.02 SHORTNESS OF BREATH: Primary | ICD-10-CM

## 2025-02-26 DIAGNOSIS — J98.4 RESTRICTIVE LUNG DISEASE: ICD-10-CM

## 2025-02-26 RX ORDER — MONTELUKAST SODIUM 10 MG/1
1 TABLET ORAL NIGHTLY
COMMUNITY

## 2025-02-26 RX ORDER — DULOXETIN HYDROCHLORIDE 30 MG/1
30 CAPSULE, DELAYED RELEASE ORAL DAILY
COMMUNITY

## 2025-02-26 RX ORDER — HYDROXYCHLOROQUINE SULFATE 200 MG/1
200 TABLET, FILM COATED ORAL EVERY 24 HOURS
COMMUNITY
Start: 2025-02-25 | End: 2025-02-26 | Stop reason: ALTCHOICE

## 2025-02-26 RX ORDER — HYDROXYZINE HYDROCHLORIDE 25 MG/1
25 TABLET, FILM COATED ORAL EVERY 4 HOURS PRN
COMMUNITY

## 2025-02-26 RX ORDER — OMEPRAZOLE 40 MG/1
1 CAPSULE, DELAYED RELEASE ORAL DAILY
COMMUNITY

## 2025-02-26 RX ORDER — POTASSIUM CHLORIDE 750 MG/1
10 TABLET, EXTENDED RELEASE ORAL DAILY
COMMUNITY

## 2025-02-26 RX ORDER — FLUTICASONE PROPIONATE 50 MCG
2 SPRAY, SUSPENSION (ML) NASAL DAILY
COMMUNITY

## 2025-02-26 RX ORDER — COLCHICINE 0.6 MG/1
0.6 TABLET ORAL EVERY 24 HOURS
COMMUNITY
Start: 2025-01-17

## 2025-02-26 RX ORDER — DILTIAZEM HYDROCHLORIDE 120 MG/1
1 CAPSULE, EXTENDED RELEASE ORAL EVERY 24 HOURS
COMMUNITY

## 2025-02-26 RX ORDER — TRIAMTERENE AND HYDROCHLOROTHIAZIDE 37.5; 25 MG/1; MG/1
1 CAPSULE ORAL EVERY MORNING
COMMUNITY

## 2025-02-26 RX ORDER — ALBUTEROL SULFATE 90 UG/1
1 INHALANT RESPIRATORY (INHALATION) EVERY 4 HOURS PRN
COMMUNITY

## 2025-02-26 RX ORDER — PREDNISONE 5 MG/1
5 TABLET ORAL
COMMUNITY
Start: 2025-02-19

## 2025-02-26 RX ORDER — ROSUVASTATIN CALCIUM 40 MG/1
40 TABLET, COATED ORAL DAILY
COMMUNITY

## 2025-02-26 RX ORDER — ROPINIROLE 0.5 MG/1
0.5 TABLET, FILM COATED ORAL 3 TIMES DAILY
COMMUNITY

## 2025-02-26 RX ORDER — BUDESONIDE AND FORMOTEROL FUMARATE DIHYDRATE 160; 4.5 UG/1; UG/1
2 AEROSOL RESPIRATORY (INHALATION) EVERY 24 HOURS
COMMUNITY

## 2025-02-26 NOTE — PROCEDURES
Walking Oximetry    Performed by: Emily Fleming MA  Authorized by: Elma Casillas APRN    Supplemental Oxygen: PRN and None  Rest room air SAT %:  97  Exercise room air SAT %:  96

## 2025-03-03 ENCOUNTER — TELEPHONE (OUTPATIENT)
Dept: PULMONOLOGY | Facility: CLINIC | Age: 70
End: 2025-03-03
Payer: MEDICARE

## 2025-03-03 RX ORDER — HYDROXYCHLOROQUINE SULFATE 200 MG/1
200 TABLET, FILM COATED ORAL
COMMUNITY
Start: 2025-02-27

## 2025-03-03 NOTE — TELEPHONE ENCOUNTER
Caller: Aida Benitez    Relationship: Self    Best call back number: [phone]     Which medication are you concerned about:   SHU HICKEY TOLD THE PT TO CALL AND ASHLEY HER KNOW WHAT MEDICATION THEY WAS GIVING HER    200MG PLAQUENIL  GENERIC hydroxychloroquine

## 2025-03-07 ENCOUNTER — PATIENT MESSAGE (OUTPATIENT)
Dept: CARDIOLOGY | Facility: CLINIC | Age: 70
End: 2025-03-07
Payer: MEDICARE

## 2025-03-10 ENCOUNTER — HOSPITAL ENCOUNTER (OUTPATIENT)
Dept: PULMONOLOGY | Facility: HOSPITAL | Age: 70
Discharge: HOME | End: 2025-03-10
Admitting: NURSE PRACTITIONER
Payer: MEDICARE

## 2025-03-10 DIAGNOSIS — G47.34 NOCTURNAL HYPOXIA: ICD-10-CM

## 2025-03-10 DIAGNOSIS — R06.02 SHORTNESS OF BREATH: ICD-10-CM

## 2025-03-10 DIAGNOSIS — J98.4 RESTRICTIVE LUNG DISEASE: ICD-10-CM

## 2025-03-10 PROCEDURE — 94729 DIFFUSING CAPACITY: CPT

## 2025-03-10 PROCEDURE — 94726 PLETHYSMOGRAPHY LUNG VOLUMES: CPT

## 2025-03-10 PROCEDURE — 94060 EVALUATION OF WHEEZING: CPT

## 2025-03-10 RX ORDER — ALBUTEROL SULFATE 0.83 MG/ML
2.5 SOLUTION RESPIRATORY (INHALATION) ONCE
Status: COMPLETED | OUTPATIENT
Start: 2025-03-10 | End: 2025-03-10

## 2025-03-10 RX ADMIN — ALBUTEROL SULFATE 2.5 MG: 2.5 SOLUTION RESPIRATORY (INHALATION) at 07:32

## 2025-03-11 ENCOUNTER — RESULTS FOLLOW-UP (OUTPATIENT)
Dept: PULMONOLOGY | Facility: HOSPITAL | Age: 70
End: 2025-03-11
Payer: MEDICARE

## 2025-03-11 RX ORDER — METOPROLOL SUCCINATE 25 MG/1
25 TABLET, EXTENDED RELEASE ORAL DAILY
Qty: 90 TABLET | Refills: 3 | Status: SHIPPED | OUTPATIENT
Start: 2025-03-11

## 2025-03-11 NOTE — TELEPHONE ENCOUNTER
Spoke with patient today. She states that BP has been running 's/40-50's. States this has been happening for about a month. She says that she has been drinking at least 64oz and has not been taking any Lasix. She has also started taking her Coreg every other day. She has spoke to her PCP but states they feel like we need to adjust.    Please advise

## 2025-03-12 NOTE — TELEPHONE ENCOUNTER
The patients phone # isn't current and I called the sister to let her know for the patient to call us.   We will need need the patients correct phone # to put in the chart.

## 2025-03-19 ENCOUNTER — RESULTS FOLLOW-UP (OUTPATIENT)
Dept: PULMONOLOGY | Facility: CLINIC | Age: 70
End: 2025-03-19
Payer: MEDICARE

## 2025-03-19 DIAGNOSIS — G47.33 OBSTRUCTIVE SLEEP APNEA SYNDROME: Primary | ICD-10-CM

## 2025-03-19 NOTE — TELEPHONE ENCOUNTER
Test results relayed to patient.  Patient voiced understanding.     Patient is agreeable to repeating the sleep study at [facility].  She will wear her oxygen in the mean time.

## 2025-03-21 ENCOUNTER — FOLLOWUP TELEPHONE ENCOUNTER (OUTPATIENT)
Dept: SLEEP CENTER | Age: 70
End: 2025-03-21

## 2025-03-21 NOTE — TELEPHONE ENCOUNTER
Reviewed outside referral for sleep study, called pt with apt, 5/20/2025 @ 8pm and pt packet mailed

## 2025-04-01 ENCOUNTER — TELEPHONE (OUTPATIENT)
Dept: CARDIOLOGY | Facility: CLINIC | Age: 70
End: 2025-04-01

## 2025-04-01 NOTE — TELEPHONE ENCOUNTER
Caller: Aida Benitez     Relationship: PT    Best call back number:[phone]    What is your medical concern? LOW BP AND FATIGUE    8 AM LEFT ARM 84/40 RIGHT /52  11:30-12 PM LEFT ARM 84/51 RIGHT /36  2 /50 RIGHT ARM 92/41 HEART RATE 62    How long has this issue been going on? FOR ABOUT 3 MONTHS SINCE LAST TALKING TO MICHA ABOUT THIS     PT SPOKE WITH MICHA ABOUT THE SAME ISSUE ABOUT 3 MO AGO AND SHE ORDERED THE PT TO STOP TAKING THE CARVEDILOL    PCP TOLD PT TO NOT LET HER BP GET BELOW 40. BECAUSE IT IS LOW, SENDING AS HIGH PRIOR    Is your provider already aware of this issue? YES    Have you been treated for this issue? YES

## 2025-04-01 NOTE — TELEPHONE ENCOUNTER
Patient is not taking diazide. She has stopped taking Coreg. She just started taking Toprol. She has not changed her batteries recently. She does not elevate her legs as much as she should. Adequate fluid intake, good output. Advised compression stockings, how to measure,with elevation and continue to monitor BP. She will call back on Friday to give an update of her BP.

## 2025-04-04 ENCOUNTER — TRANSCRIBE ORDERS (OUTPATIENT)
Dept: ADMINISTRATIVE | Facility: HOSPITAL | Age: 70
End: 2025-04-04
Payer: MEDICARE

## 2025-04-04 DIAGNOSIS — M79.601 BILATERAL ARM PAIN: Primary | ICD-10-CM

## 2025-04-04 DIAGNOSIS — M79.602 BILATERAL ARM PAIN: Primary | ICD-10-CM

## 2025-04-17 ENCOUNTER — HOSPITAL ENCOUNTER (OUTPATIENT)
Dept: ULTRASOUND IMAGING | Facility: HOSPITAL | Age: 70
Discharge: HOME | End: 2025-04-17
Admitting: NURSE PRACTITIONER
Payer: MEDICARE

## 2025-04-17 ENCOUNTER — OFFICE VISIT (OUTPATIENT)
Dept: VASCULAR SURGERY | Facility: CLINIC | Age: 70
End: 2025-04-17
Payer: MEDICARE

## 2025-04-17 VITALS
DIASTOLIC BLOOD PRESSURE: 76 MMHG | WEIGHT: 236 LBS | HEART RATE: 67 BPM | SYSTOLIC BLOOD PRESSURE: 122 MMHG | HEIGHT: 60 IN | OXYGEN SATURATION: 98 % | BODY MASS INDEX: 46.33 KG/M2

## 2025-04-17 DIAGNOSIS — E66.01 CLASS 3 SEVERE OBESITY DUE TO EXCESS CALORIES WITH SERIOUS COMORBIDITY AND BODY MASS INDEX (BMI) OF 45.0 TO 49.9 IN ADULT: ICD-10-CM

## 2025-04-17 DIAGNOSIS — R60.0 BILATERAL LOWER EXTREMITY EDEMA: ICD-10-CM

## 2025-04-17 DIAGNOSIS — I87.323 CHRONIC VENOUS HYPERTENSION WITH INFLAMMATION INVOLVING BOTH SIDES: Primary | ICD-10-CM

## 2025-04-17 DIAGNOSIS — I65.23 BILATERAL CAROTID ARTERY STENOSIS: ICD-10-CM

## 2025-04-17 DIAGNOSIS — E66.813 CLASS 3 SEVERE OBESITY DUE TO EXCESS CALORIES WITH SERIOUS COMORBIDITY AND BODY MASS INDEX (BMI) OF 45.0 TO 49.9 IN ADULT: ICD-10-CM

## 2025-04-17 DIAGNOSIS — E11.42 TYPE 2 DIABETES MELLITUS WITH DIABETIC POLYNEUROPATHY, WITH LONG-TERM CURRENT USE OF INSULIN: ICD-10-CM

## 2025-04-17 DIAGNOSIS — I10 BENIGN ESSENTIAL HYPERTENSION: ICD-10-CM

## 2025-04-17 DIAGNOSIS — E78.5 HYPERLIPIDEMIA, UNSPECIFIED HYPERLIPIDEMIA TYPE: ICD-10-CM

## 2025-04-17 DIAGNOSIS — Z79.4 TYPE 2 DIABETES MELLITUS WITH DIABETIC POLYNEUROPATHY, WITH LONG-TERM CURRENT USE OF INSULIN: ICD-10-CM

## 2025-04-17 PROCEDURE — 93880 EXTRACRANIAL BILAT STUDY: CPT

## 2025-04-17 NOTE — PROGRESS NOTES
"4/17/2025        Chey Craig APRN  [address] [facility] [address]      Aida Benitez  1955    Chief Complaint   Patient presents with    Follow-up     6 month follow up w/ testing. Last seen 12/18/24. Patient denies any stroke type symptoms or issues with legs.        Dear Chey Craig APRN        HPI  I had the pleasure of seeing your patient Aida Benitez in the office today.   As you recall, Aida Benitez is a 69 y.o.  female who you are currently following for routine health maintenance.  She returns today for follow-up with testing.  She does continue to have swelling to her bilateral lower extremities.  She has not purchased compression stockings and began to wear them.  She denies any symptoms of claudication or ischemia pain.  She denies any strokelike symptoms.  She is maintained on aspirin, Lipitor, Zetia, and Eliquis.  She did have noninvasive testing performed, which I did review in office.      Review of Systems   Constitutional: Negative.  Negative for diaphoresis and fever.   HENT: Negative.     Eyes: Negative.    Respiratory: Negative.  Negative for shortness of breath and wheezing.    Cardiovascular:  Positive for leg swelling.   Gastrointestinal: Negative.  Negative for abdominal pain.   Endocrine: Negative.    Genitourinary: Negative.    Musculoskeletal:  Positive for arthralgias and back pain. Negative for gait problem.   Skin: Negative.    Allergic/Immunologic: Negative.    Neurological:  Positive for numbness (Tingling in her hands). Negative for dizziness.   Hematological: Negative.    Psychiatric/Behavioral: Negative.         /76   Pulse 67   Ht 152.4 cm (60\")   Wt 107 kg (236 lb)   SpO2 98%   BMI 46.09 kg/m²       Physical Exam  Vitals and nursing note reviewed.   Constitutional:       General: She is not in acute distress.     Appearance: Normal appearance. She is morbidly obese. She is not diaphoretic.   HENT:      Head: Normocephalic. No right periorbital " erythema or left periorbital erythema.      Nose: Nose normal.   Eyes:      General: No scleral icterus.     Pupils: Pupils are equal.   Cardiovascular:      Rate and Rhythm: Normal rate and regular rhythm.      Pulses: Normal pulses.      Heart sounds: Normal heart sounds. No murmur heard.  Pulmonary:      Effort: Pulmonary effort is normal. No respiratory distress.      Breath sounds: Normal breath sounds.   Abdominal:      General: Bowel sounds are normal. There is no distension.      Palpations: Abdomen is soft.      Tenderness: There is no abdominal tenderness. There is no guarding.   Musculoskeletal:         General: No swelling or tenderness. Normal range of motion.      Cervical back: Normal range of motion and neck supple.      Right lower le+ Edema present.      Left lower le+ Edema present.   Feet:      Right foot:      Skin integrity: Skin integrity normal.      Left foot:      Skin integrity: Skin integrity normal.   Skin:     General: Skin is warm and dry.      Findings: No erythema or rash.   Neurological:      General: No focal deficit present.      Mental Status: She is alert and oriented to person, place, and time. Mental status is at baseline.      Cranial Nerves: No cranial nerve deficit.      Gait: Gait normal.   Psychiatric:         Attention and Perception: Attention normal.         Mood and Affect: Mood normal.         Behavior: Behavior normal.         Thought Content: Thought content normal.         Judgment: Judgment normal.         DIAGNOSTIC DATA    Carotid duplex shows less than 50% bilateral carotid stenosis.  Antegrade vertebral flow demonstrated bilaterally.    Patient Active Problem List   Diagnosis    History of anaphylaxis to COVID-19 vaccine    MCNEILL (dyspnea on exertion)    Obesity    Chest heaviness    Palpitations    Uncontrolled type 2 diabetes mellitus with hyperglycemia    Hyperlipidemia    Dysphagia    Abnormal cardiovascular stress test    Chronic diastolic  congestive heart failure    Chronic atrial fibrillation    Current use of long term anticoagulation    Benign essential hypertension    CAD (coronary artery disease)    Abnormal cardiac CT angiography    Unstable angina    CAD (coronary artery disease)    Zenker diverticula    Anxiety    Sleep apnea    Chronic gout of multiple sites    Chronic pain    CKD (chronic kidney disease)    DDD (degenerative disc disease), cervical    DM (diabetes mellitus)    Dyslipidemia    GERD (gastroesophageal reflux disease)    High risk medication use    Hallux limitus, acquired    History of sleeve gastrectomy    Hyperthyroidism    Lumbar stenosis with neurogenic claudication    Lumbosacral spondylosis without myelopathy    Onychocryptosis    Nonobstructive atherosclerosis of coronary artery    Myofascial pain    Pernicious anemia    Psoriatic arthritis    Rheumatoid arthritis involving multiple sites    Tachycardia    Type 2 diabetes mellitus with diabetic polyneuropathy, with long-term current use of insulin    Vitamin D deficiency    Hyperlipidemia, mixed         ICD-10-CM ICD-9-CM   1. Chronic venous hypertension with inflammation involving both sides  I87.323 459.32   2. Bilateral lower extremity edema  R60.0 782.3   3. Benign essential hypertension  I10 401.1   4. Hyperlipidemia, unspecified hyperlipidemia type  E78.5 272.4   5. Type 2 diabetes mellitus with diabetic polyneuropathy, with long-term current use of insulin  E11.42 250.60    Z79.4 357.2     V58.67   6. Class 3 severe obesity due to excess calories with serious comorbidity and body mass index (BMI) of 45.0 to 49.9 in adult  E66.813 278.01    Z68.42 V85.42    E66.01              Plan: After thoroughly evaluating Aida Benitez, I believe the best course of action is to remain conservative from vascular surgery standpoint.  I did again stressed the importance of compression to her bilateral lower extremities from the calf down, she has yet to acquire those.  I  explained that insurance would not let us move forward with a procedure if she did not wear compression for at least 4 weeks.  She stated she would  compression socks today and begin wearing them, she should apply them in the morning before her legs become dependent and remove them in the evening.  She should also elevate her legs when not on them.  She verbally understood.  Her carotid duplex shows less than 50% bilateral carotid stenosis. We will see the patient back in 1 month with repeat noninvasive testing for continued surveillance including VVI.  I did discuss vascular risk factors as they pertain to the progression of vascular disease including controlling her hypertension, hyperlipidemia, and diabetes.  Her blood pressure is stable today in office.  The last reviewable lipid panel shows elevated triglycerides at 167 and a decreased HDL at 31, all other values are within normal limits.  Her last reviewable hemoglobin A1c was uncontrolled at 7.7%.  Body mass index is 46.09 kg/m².       This was all discussed in full with complete understanding.    Thank you for allowing me to participate in the care of your patient.  Please do not hesitate with any questions or concerns.  I will keep you aware of any further encounters with Aida Benitez.        Sincerely yours,         CHAIM Montanez

## 2025-04-21 ENCOUNTER — OFFICE VISIT (OUTPATIENT)
Dept: PULMONOLOGY | Facility: CLINIC | Age: 70
End: 2025-04-21
Payer: MEDICARE

## 2025-04-21 VITALS
HEIGHT: 60 IN | SYSTOLIC BLOOD PRESSURE: 102 MMHG | OXYGEN SATURATION: 97 % | WEIGHT: 237 LBS | HEART RATE: 78 BPM | DIASTOLIC BLOOD PRESSURE: 78 MMHG | BODY MASS INDEX: 46.53 KG/M2

## 2025-04-21 DIAGNOSIS — Z92.29 HISTORY OF AMIODARONE THERAPY: ICD-10-CM

## 2025-04-21 DIAGNOSIS — Z79.899 HIGH RISK MEDICATION USE: ICD-10-CM

## 2025-04-21 DIAGNOSIS — G47.33 OSA (OBSTRUCTIVE SLEEP APNEA): Primary | ICD-10-CM

## 2025-04-21 DIAGNOSIS — Z92.21 HISTORY OF METHOTREXATE THERAPY: ICD-10-CM

## 2025-04-21 DIAGNOSIS — M06.9 RHEUMATOID ARTHRITIS INVOLVING MULTIPLE SITES, UNSPECIFIED WHETHER RHEUMATOID FACTOR PRESENT: ICD-10-CM

## 2025-04-21 DIAGNOSIS — R06.09 DYSPNEA ON EXERTION: ICD-10-CM

## 2025-04-21 PROCEDURE — 99214 OFFICE O/P EST MOD 30 MIN: CPT | Performed by: NURSE PRACTITIONER

## 2025-04-21 PROCEDURE — 3078F DIAST BP <80 MM HG: CPT | Performed by: NURSE PRACTITIONER

## 2025-04-21 PROCEDURE — 3074F SYST BP LT 130 MM HG: CPT | Performed by: NURSE PRACTITIONER

## 2025-04-21 RX ORDER — ALLOPURINOL 100 MG/1
100 TABLET ORAL
COMMUNITY
Start: 2025-01-11

## 2025-04-21 NOTE — PROGRESS NOTES
" CHAIM Bettencourt  [facility]   Pulmonary and Critical Care  [address] [place] [address]  Phone: [phone]  Fax: [phone]           Chief Complaint  Shortness of Breath    Subjective        Aida REJI Benitez presents to [facility] PULMONARY & CRITICAL CARE MEDICINE     History of Present Illness  Ms Benitez is a 69 year old female patient referred last visit by PCP for shortness of breath. She has known COPD, bronchiectasis, atrial fibrillation, CAD, diastolic heart failure,anxiety, depression, diabetes, GERD,thyroid disease,rheumatoid arthritis, sarcoidosis, hypertension, vitamin B deficiency, vitamin D deficiency,  obesity and history of covid.     She reports experiencing shortness of breath, which she suspects may be related to her cardiac condition. She has not received the influenza vaccine this year. She is currently on Entresto for her heart issues.    She has a history of rheumatoid arthritis and was on methotrexate in the past but has been off of this for some time.  Her current medication regimen includes Plaquenil and colchicine.    She is scheduled for a sleep study on 05/20/2025.      PFT showed-decrease in mid flows otherwise normal after BD normal and no significant change lung volumes essentially normal DLCO normal== improved compared to 2023  Weight on current study was 236 weight on prior was 251    Overnight showed-room air showed her an hour and 9 minutes less than 88% with a low O2 sat of 80. Her HECTOR was 31.  Wearing oxygen until sleep study    She continues to find benefit from Symbicort and albuterol HFA      Objective   Vital Signs:   /78   Pulse 78   Ht 152.4 cm (60\")   Wt 108 kg (237 lb)   SpO2 97% Comment: RA  BMI 46.29 kg/m²     Physical Exam  Vitals reviewed.   Constitutional:       Appearance: Normal appearance. She is morbidly obese.   Cardiovascular:      Rate and Rhythm: Normal rate and regular rhythm.   Pulmonary: "      Effort: Pulmonary effort is normal.      Breath sounds: Normal breath sounds.   Neurological:      General: No focal deficit present.      Mental Status: She is alert and oriented to person, place, and time.   Psychiatric:         Mood and Affect: Mood normal.         Behavior: Behavior normal.            Result Review :  The following data was reviewed by: CHAIM Bettencourt on 04/21/2025:    My interpretation of imaging: No new  My interpretation of labs: No new  Overnight Sleep Oximetry Study (03/12/2025 00:00)   Overnight as noted above.  My interpretation of the PFT : As below    Results for orders placed during the hospital encounter of 03/10/25    Complete PFT - Pre & Post Bronchodilator    Narrative  [facility] - Pulmonary Function Test    [address]  [address]  [phone]    Patient : Aida Benitez  MRN : [id number]  CSN : [id number]  Pulmonologist : Getachew Burgos MD  Date : 3/11/2025    ______________________________________________________________________    Interpretation :  1.  Spirometry reveals a mild decrease in midflows and otherwise is within normal limits.  The patient's FVC and FEV1 are in the low normal range.  2.  The patient's postbronchodilator spirometry reveals a slight decline in the FVC which is now mildly decreased and slight improvement in midflows which are now normal.  Otherwise there is no significant change in spirometry postbronchodilator.  3.  Lung volumes reveal a decrease in functional residual capacity and expiratory reserve volume but otherwise are within normal limits.  4.  There is a mild diffusion impairment which when corrected for alveolar volume is normalized.  5.  When current studies are compared to studies performed at [facility] on October 5, 2023, the patient's current prebronchodilator spirometry reveals improvement compared to previous prebronchodilator values.  Likewise the patient's current  postbronchodilator spirometry reveals improvement compared to previous postbronchodilator values.      Getachew Burgos MD    Rest/Exercise Pulse Ox Values          2/26/2025    13:45   Rest/Exercise Pulse Ox Results   Rest room air SAT % 97   Exercise room air SAT % 96         Assessment and Plan   Diagnoses and all orders for this visit:    1. JADA (obstructive sleep apnea) (Primary)    2. Rheumatoid arthritis involving multiple sites, unspecified whether rheumatoid factor present    3. High risk medication use    4. History of methotrexate therapy    5. History of amiodarone therapy    6. Dyspnea on exertion      We reviewed and discussed her pulmonary function study results essentially normal.  She had improvement compared to her last study in 2023.  Her weight was decreased on the more recent study.  She will continue Symbicort and albuterol as needed as she does find benefit.  She will continue nocturnal supplemental oxygen and keep her sleep study scheduled for May 20.  I have asked her to return in 1 year with flow-volume loop and diffusion capacity given her ongoing diagnosis of rheumatoid arthritis.  Patient is agreeable to the plan.    Alpha 1: Not required  LDCT: Never smoker  Smoking Cessation: Never smoker  Vaccinations: Flu:  Declines vaccinations    Follow Up   Return in about 1 year (around 4/21/2026) for FVL w DIF.  Patient was given instructions and counseling regarding her condition or for health maintenance advice. Please see specific information pulled into the AVS if appropriate.     CHAIM Bettencourt  4/21/2025  11:12 CDT    Please note that portions of this note were completed with a voice recognition program.    Patient or patient representative verbalized consent for the use of Ambient Listening during the visit with  CHAIM Bettencourt for chart documentation. 4/21/2025  11:13 CDT

## 2025-04-22 ENCOUNTER — OFFICE VISIT (OUTPATIENT)
Dept: CARDIOLOGY | Facility: CLINIC | Age: 70
End: 2025-04-22
Payer: MEDICARE

## 2025-04-22 VITALS
HEIGHT: 60 IN | HEART RATE: 68 BPM | WEIGHT: 238 LBS | DIASTOLIC BLOOD PRESSURE: 55 MMHG | SYSTOLIC BLOOD PRESSURE: 108 MMHG | BODY MASS INDEX: 46.72 KG/M2

## 2025-04-22 DIAGNOSIS — I48.0 PAROXYSMAL ATRIAL FIBRILLATION: ICD-10-CM

## 2025-04-22 DIAGNOSIS — E66.01 CLASS 3 SEVERE OBESITY DUE TO EXCESS CALORIES WITH SERIOUS COMORBIDITY AND BODY MASS INDEX (BMI) OF 45.0 TO 49.9 IN ADULT: ICD-10-CM

## 2025-04-22 DIAGNOSIS — I50.32 CHRONIC DIASTOLIC CONGESTIVE HEART FAILURE: Primary | ICD-10-CM

## 2025-04-22 DIAGNOSIS — Z79.01 CURRENT USE OF LONG TERM ANTICOAGULATION: ICD-10-CM

## 2025-04-22 DIAGNOSIS — E11.65 UNCONTROLLED TYPE 2 DIABETES MELLITUS WITH HYPERGLYCEMIA: ICD-10-CM

## 2025-04-22 DIAGNOSIS — I25.118 CORONARY ARTERY DISEASE OF NATIVE ARTERY OF NATIVE HEART WITH STABLE ANGINA PECTORIS: ICD-10-CM

## 2025-04-22 DIAGNOSIS — E66.813 CLASS 3 SEVERE OBESITY DUE TO EXCESS CALORIES WITH SERIOUS COMORBIDITY AND BODY MASS INDEX (BMI) OF 45.0 TO 49.9 IN ADULT: ICD-10-CM

## 2025-04-22 DIAGNOSIS — I10 PRIMARY HYPERTENSION: ICD-10-CM

## 2025-04-22 DIAGNOSIS — E78.5 HYPERLIPIDEMIA, UNSPECIFIED HYPERLIPIDEMIA TYPE: ICD-10-CM

## 2025-04-22 PROCEDURE — 93000 ELECTROCARDIOGRAM COMPLETE: CPT | Performed by: NURSE PRACTITIONER

## 2025-04-22 PROCEDURE — 99214 OFFICE O/P EST MOD 30 MIN: CPT | Performed by: NURSE PRACTITIONER

## 2025-04-22 PROCEDURE — 1159F MED LIST DOCD IN RCRD: CPT | Performed by: NURSE PRACTITIONER

## 2025-04-22 PROCEDURE — 3074F SYST BP LT 130 MM HG: CPT | Performed by: NURSE PRACTITIONER

## 2025-04-22 PROCEDURE — 3078F DIAST BP <80 MM HG: CPT | Performed by: NURSE PRACTITIONER

## 2025-04-22 PROCEDURE — 1160F RVW MEDS BY RX/DR IN RCRD: CPT | Performed by: NURSE PRACTITIONER

## 2025-04-22 NOTE — PROGRESS NOTES
Chief Complaint  Congestive Heart Failure (6mo F/U), Paroxysmal afib, and Coronary Artery Disease    Subjective          Aida Benitez presents to [facility] for routine follow-up.  She has chronic diastolic congestive heart failure, multivessel coronary artery disease, paroxysmal atrial fibrillation on chronic anticoagulation, type 2 diabetes mellitus, hypertension, hyperlipidemia, type 2 diabetes mellitus and morbid obesity. She continues to complain of chronic dyspnea with mild to moderate exertion. She reports orthopnea requiring two pillows to sleep. She complains of diaphoresis with exertion. She continues to report occasional chest pain that improves with nitroglycerin. She states that she has only had to take nitroglycerin two or three times since she was here last six months ago. Patient denies palpitations, dizziness, syncope, orthopnea, PND, edema or decreased stamina.  Patient denies any signs of bleeding.    Congestive Heart Failure  Presents for follow-up visit. Associated symptoms include chest pain, orthopnea and shortness of breath. Pertinent negatives include no abdominal pain, chest pressure, claudication, edema, fatigue, muscle weakness, near-syncope, nocturia, palpitations, paroxysmal nocturnal dyspnea or unexpected weight change. The symptoms have been stable. Her past medical history is significant for CAD. Compliance with total regimen is 51-75%. Compliance with diet is 51-75%. Compliance with exercise is 51-75%. Compliance with medications is %.   Atrial Fibrillation  Presents for follow-up visit. Symptoms include chest pain and shortness of breath. Symptoms are negative for an AICD problem, bradycardia, dizziness, hemodynamic instability, hypertension, hypotension, pacemaker problem, palpitations, syncope and tachycardia. The symptoms have been stable. Past medical history includes atrial fibrillation, CAD, CHF and hyperlipidemia.   Hypertension  This is  a chronic problem. The current episode started more than 1 year ago. The problem is controlled. Associated symptoms include chest pain and shortness of breath. Pertinent negatives include no anxiety, blurred vision, malaise/fatigue, orthopnea, palpitations, peripheral edema, PND or sweats. Risk factors for coronary artery disease include obesity, post-menopausal state, dyslipidemia and diabetes mellitus. Current antihypertension treatment includes beta blockers and diuretics. The current treatment provides significant improvement. Hypertensive end-organ damage includes heart failure. Identifiable causes of hypertension include sleep apnea.   Hyperlipidemia  This is a chronic problem. The current episode started more than 1 year ago. Exacerbating diseases include diabetes and obesity. Associated symptoms include chest pain and shortness of breath. Current antihyperlipidemic treatment includes statins. Risk factors for coronary artery disease include hypertension, obesity, post-menopausal, dyslipidemia and diabetes mellitus.   Coronary Artery Disease  Presents for follow-up visit. Symptoms include chest pain and shortness of breath. Pertinent negatives include no chest pressure, chest tightness, dizziness, leg swelling, muscle weakness, palpitations or weight gain. Risk factors include hyperlipidemia and obesity. Risk factors do not include hypertension. Her past medical history is significant for CHF. The symptoms have been stable. Compliance with diet is variable. Compliance with exercise is variable. Compliance with medications is good.     I have reviewed and confirmed the accuracy of the ROS CHAIM Young    Objective     Current Outpatient Medications:     albuterol sulfate  (90 Base) MCG/ACT inhaler, Inhale 1 puff Every 4 (Four) Hours As Needed for Wheezing or Shortness of Air., Disp: , Rfl:     allopurinol (ZYLOPRIM) 100 MG tablet, Take 1 tablet by mouth., Disp: , Rfl:     apixaban (ELIQUIS) 5  MG tablet tablet, Take 1 tablet by mouth 2 (Two) Times a Day., Disp: 60 tablet, Rfl: 11    Aspirin Low Dose 81 MG EC tablet, TAKE ONE TABLET DAILY, Disp: 30 tablet, Rfl: 11    atorvastatin (LIPITOR) 40 MG tablet, Take 2 tablets by mouth Every Night., Disp: , Rfl:     B Complex Vitamins (VITAMIN B-COMPLEX PO), Take  by mouth Daily., Disp: , Rfl:     celecoxib (CeleBREX) 100 MG capsule, Take 1 capsule by mouth Daily., Disp: , Rfl:     Cholecalciferol (VITAMIN D3 PO), Take  by mouth., Disp: , Rfl:     colchicine 0.6 MG tablet, Take 1 tablet by mouth Daily., Disp: , Rfl:     dilTIAZem SR (CARDIZEM SR) 120 MG 12 hr capsule, Take 1 capsule by mouth Daily., Disp: , Rfl:     DULoxetine (CYMBALTA) 30 MG capsule, Take 1 capsule by mouth Daily., Disp: , Rfl:     Entresto 24-26 MG tablet, TAKE 1 TABLET BY MOUTH 2 (TWO) TIMES A DAY., Disp: 60 tablet, Rfl: 11    ezetimibe (ZETIA) 10 MG tablet, Take 1 tablet by mouth Daily., Disp: , Rfl:     ferrous sulfate 325 (65 FE) MG tablet, Take 1 tablet by mouth As Needed., Disp: , Rfl:     fluticasone (FLONASE) 50 MCG/ACT nasal spray, Administer 2 sprays into the nostril(s) as directed by provider Daily., Disp: , Rfl:     furosemide (LASIX) 20 MG tablet, Take 1 tablet by mouth As Needed., Disp: , Rfl:     gabapentin (NEURONTIN) 300 MG capsule, Take 400 mg by mouth 3 (Three) Times a Day., Disp: , Rfl:     HumaLOG KwikPen 100 UNIT/ML solution pen-injector, , Disp: , Rfl:     HYDROcodone-acetaminophen (NORCO)  MG per tablet, Take 1 tablet by mouth Every 6 (Six) Hours As Needed for Moderate Pain., Disp: , Rfl:     hydroxychloroquine (PLAQUENIL) 200 MG tablet, 1 tablet., Disp: , Rfl:     hydrOXYzine (ATARAX) 25 MG tablet, Take 1 tablet by mouth Every 4 (Four) Hours As Needed for Anxiety., Disp: , Rfl:     isosorbide mononitrate (IMDUR) 120 MG 24 hr tablet, Take 1 tablet by mouth Daily., Disp: 90 tablet, Rfl: 3    Lantus 100 UNIT/ML injection, INJECT 80 UNITS SUBCUTANEOUS TWICE DAILY,  Disp: , Rfl:     levothyroxine (SYNTHROID, LEVOTHROID) 88 MCG tablet, Take 1 tablet by mouth Daily., Disp: , Rfl:     metFORMIN (GLUCOPHAGE) 1000 MG tablet, Take 1 tablet by mouth 2 (Two) Times a Day With Meals., Disp: , Rfl:     metoprolol succinate XL (TOPROL-XL) 25 MG 24 hr tablet, Take 1 tablet by mouth Daily., Disp: 90 tablet, Rfl: 3    montelukast (SINGULAIR) 10 MG tablet, Take 1 tablet by mouth Every Night., Disp: , Rfl:     naloxone (NARCAN) 4 MG/0.1ML nasal spray, Call 911. Don't prime. [place] in 1 nostril for overdose. Repeat in 2-3 minutes in other nostril if no or minimal breathing/responsiveness., Disp: 2 each, Rfl: 0    nitroglycerin (NITROSTAT) 0.3 MG SL tablet, Place 1 tablet under the tongue Every 5 (Five) Minutes As Needed for Chest Pain. Take no more than 3 doses in 15 minutes., Disp: , Rfl:     nystatin (MYCOSTATIN) 899166 UNIT/GM cream, Apply 1 Application topically to the appropriate area as directed 2 (Two) Times a Day., Disp: , Rfl:     nystatin (MYCOSTATIN) 007114 UNIT/GM powder, Apply  topically to the appropriate area as directed 2 (Two) Times a Day., Disp: , Rfl:     omeprazole (priLOSEC) 40 MG capsule, Take 1 capsule by mouth Daily., Disp: , Rfl:     ondansetron (Zofran) 4 MG tablet, Take 1 tablet by mouth Every 4 (Four) Hours., Disp: 10 tablet, Rfl: 0    oxybutynin (DITROPAN) 5 MG tablet, Take 1 tablet by mouth 1 (One) Time As Needed., Disp: , Rfl:     oxyCODONE-acetaminophen (PERCOCET) 7.5-325 MG per tablet, Take 1 tablet by mouth Every 4 (Four) Hours As Needed for Severe Pain., Disp: 10 tablet, Rfl: 0    pantoprazole (PROTONIX) 40 MG EC tablet, Take 1 tablet by mouth Daily., Disp: , Rfl:     rOPINIRole (REQUIP) 0.5 MG tablet, Take 1 tablet by mouth 3 (Three) Times a Day., Disp: , Rfl:     rosuvastatin (CRESTOR) 40 MG tablet, Take 1 tablet by mouth Daily., Disp: , Rfl:     sertraline (ZOLOFT) 25 MG tablet, Take 1 tablet by mouth Daily., Disp: , Rfl:     spironolactone (ALDACTONE) 25 MG  "tablet, TAKE ONE TABLET DAILY, Disp: 90 tablet, Rfl: 3    Symbicort 160-4.5 MCG/ACT inhaler, Inhale 2 puffs Daily., Disp: , Rfl:     Tirzepatide 15 MG/0.5ML solution auto-injector, 1 (One) Time Per Week., Disp: , Rfl:     vitamin D (ERGOCALCIFEROL) 1.25 MG (44512 UT) capsule capsule, Take 1 capsule by mouth Every 7 (Seven) Days., Disp: , Rfl:     Calcium Carb-Cholecalciferol (calcium 500 mg vitamin D 5 mcg, 200 UT,) 500-5 MG-MCG tablet per tablet, , Disp: , Rfl:     CALCIUM PO, Take  by mouth Daily., Disp: , Rfl:   Vital Signs:   /55   Pulse 68   Ht 152.4 cm (60\")   Wt 108 kg (238 lb)   BMI 46.48 kg/m²     Vitals and nursing note reviewed.   Constitutional:       General: Not in acute distress.     Appearance: Normal and healthy appearance. Well-developed and not in distress. Morbidly obese. Not diaphoretic.   Eyes:      General: Lids are normal.         Right eye: No discharge.         Left eye: No discharge.      Conjunctiva/sclera: Conjunctivae normal.      Pupils: Pupils are equal, round, and reactive to light.   HENT:      Head: Normocephalic and atraumatic.      Jaw: There is normal jaw occlusion.      Right Ear: External ear normal.      Left Ear: External ear normal.      Nose: Nose normal.   Neck:      Thyroid: No thyromegaly.      Vascular: No carotid bruit, JVD or JVR. JVD normal.      Trachea: Trachea normal. No tracheal deviation.   Pulmonary:      Effort: Pulmonary effort is normal. No respiratory distress.      Breath sounds: Decreased air movement present. Examination of the right-lower field reveals decreased breath sounds. Examination of the left-lower field reveals decreased breath sounds. Decreased breath sounds present. No wheezing. No rhonchi. No rales.   Chest:      Chest wall: Not tender to palpatation.   Cardiovascular:      PMI at left midclavicular line. Normal rate. Regular rhythm. Normal S1. Normal S2.       Murmurs: There is no murmur.      No gallop.  No click. No rub. "   Pulses:     Intact distal pulses. No decreased pulses.   Edema:     Peripheral edema absent.   Abdominal:      General: Bowel sounds are normal. There is no distension.      Palpations: Abdomen is soft.      Tenderness: There is no abdominal tenderness.   Musculoskeletal: Normal range of motion.         General: No tenderness or deformity.      Cervical back: Normal range of motion and neck supple. Skin:     General: Skin is warm and dry.      Coloration: Skin is not pale.      Findings: No erythema or rash.   Neurological:      General: No focal deficit present.      Mental Status: Alert, oriented to person, place, and time and oriented to person, place and time.   Psychiatric:         Attention and Perception: Attention and perception normal.         Mood and Affect: Mood and affect normal.         Speech: Speech normal.         Behavior: Behavior normal.         Thought Content: Thought content normal.         Cognition and Memory: Cognition and memory normal.         Judgment: Judgment normal.        Result Review :   The following data was reviewed by: CHAIM Young on 04/22/2025:  Common labs          12/19/2024    12:15   Common Labs   Glucose 187    BUN 8    Creatinine 0.95    Sodium 143    Potassium 3.8    Chloride 107    Calcium 9.1    WBC 7.82    Hemoglobin 11.3    Hematocrit 35.9    Platelets 203      Data reviewed: Cardiology studies holter monitor 12/11/22, 2d echo 12/17/22, lexiscan 12/16/22 and CTA of the coronary arteries 1/20/23    ECG 12 Lead    Date/Time: 4/22/2025 2:26 PM  Performed by: Trudy Pierre APRN    Authorized by: Trudy Pierre APRN  Comparison: compared with previous ECG from 7/18/2024  Similar to previous ECG  Rhythm: sinus rhythm and sinus arrhythmia  Rate: normal  BPM: 68  QRS axis: right    Clinical impression: abnormal EKG            Assessment and Plan    Diagnoses and all orders for this visit:    1. Chronic diastolic congestive heart failure (HCC)  (Primary)-NYHA class II.  Stage C.  Compensated. Reviewed signs and symptoms of CHF and what to report with the patient. Patient instructed to restrict sodium and weigh daily. Report weight gain of greater than 2 lbs overnight or 5 lbs in 1 week. Pt verbalized understanding of instructions and plan of care.  Pt has failed Jardiance in the past due to persistent candidiasis.  Continue spironolactone and Entresto. Consider up titration of Entresto and spironlactone in the future, if tolerated.     2. PAF (paroxysmal atrial fibrillation) (Formerly [facility])- in sinus rhythm today.  Anticoagulated.  Stable.  Continue amiodarone.    3. Current use of long term anticoagulation-patient continues on Eliquis.  Denies bleeding.    4. Primary hypertension-blood pressure has been well-controlled. Continue Entresto, carvedilol and spironolactone.  Monitor and record daily blood pressure. Report readings consistently higher than 130/80 or consistently lower than 100/60.     5. Mixed hyperlipidemia-management per PCP.  Continue atorvastatin.    6. Uncontrolled type 2 diabetes mellitus with hyperglycemia (Formerly [facility])- type 2 diabetes mellitus in the setting of congestive heart failure.  Patient has failed Jardiance in the past due to persistent candidiasis.  Management per PCP.    7. Class 3 severe obesity due to excess calories with serious comorbidity and body mass index (BMI) of 45.0 to 49.9 in adult (Formerly [facility])- Class 3 Severe Obesity (BMI >=40). Obesity-related health conditions include the following: obstructive sleep apnea, hypertension, diabetes mellitus, dyslipidemias and GERD. Obesity is unchanged. BMI is is above average; no BMI management plan is appropriate. We discussed low calorie, low carb based diet program, portion control and increasing exercise.    8. Coronary artery disease of native artery of native heart with stable angina pectoris-patient has been referred to CT surgery at [facility] in [place], [place] for high risk CABG. She was  evaluated in June, however since that time the practice has transitioned her care to Dr. Tristin Anguiano. Left heart cath 8/18/23 revealed 70% stenosis of 2nd PDL that will be treated medically. Mid-LAD had heavy calcification with abnormal RFR, however balloon was unable to be inflated in that area. She is planned for a cardiac PET scan next month. Continue Imdur. Pt was previously on Ranexa, however she is unable to swallow tablet. Pharmacy has been unable to obtain sprinkle form.     Follow Up   Return in about 6 months (around 10/22/2025) for Next scheduled follow up.  Patient was given instructions and counseling regarding her condition or for health maintenance advice. Please see specific information pulled into the AVS if appropriate.

## 2025-05-05 ENCOUNTER — HOSPITAL ENCOUNTER (OUTPATIENT)
Dept: NEUROLOGY | Facility: HOSPITAL | Age: 70
Discharge: HOME | End: 2025-05-05
Admitting: NURSE PRACTITIONER
Payer: MEDICARE

## 2025-05-05 DIAGNOSIS — M79.601 BILATERAL ARM PAIN: ICD-10-CM

## 2025-05-05 DIAGNOSIS — M79.602 BILATERAL ARM PAIN: ICD-10-CM

## 2025-05-05 PROCEDURE — 95886 MUSC TEST DONE W/N TEST COMP: CPT

## 2025-05-05 PROCEDURE — 95911 NRV CNDJ TEST 9-10 STUDIES: CPT

## 2025-06-20 RX ORDER — SPIRONOLACTONE 25 MG/1
25 TABLET ORAL DAILY
Qty: 90 TABLET | Refills: 3 | Status: SHIPPED | OUTPATIENT
Start: 2025-06-20

## 2025-07-17 RX ORDER — SPIRONOLACTONE 25 MG/1
25 TABLET ORAL DAILY
OUTPATIENT
Start: 2025-07-17

## 2025-08-15 RX ORDER — SACUBITRIL AND VALSARTAN 24; 26 MG/1; MG/1
1 TABLET, FILM COATED ORAL 2 TIMES DAILY
Qty: 180 TABLET | Refills: 1 | Status: SHIPPED | OUTPATIENT
Start: 2025-08-15

## 2025-08-27 ENCOUNTER — TELEPHONE (OUTPATIENT)
Dept: VASCULAR SURGERY | Facility: CLINIC | Age: 70
End: 2025-08-27
Payer: MEDICARE

## (undated) DEVICE — SKIN PREP TRAY W/CHG: Brand: MEDLINE INDUSTRIES, INC.

## (undated) DEVICE — DISPOSABLE TOURNIQUET CUFF 34"X4", 1-LINE, BLUE, STERILE, 1EA/PK, 10PK/CS: Brand: ASP MEDICAL

## (undated) DEVICE — SUP ARMBRD ART/LINE BLU

## (undated) DEVICE — ANTIBACTERIAL UNDYED BRAIDED (POLYGLACTIN 910), SYNTHETIC ABSORBABLE SUTURE: Brand: COATED VICRYL

## (undated) DEVICE — 6F .070 JL3.5 100CM: Brand: CORDIS

## (undated) DEVICE — SOL IRR NACL 0.9PCT BT 1000ML

## (undated) DEVICE — MODEL AT P65, P/N 701554-001KIT CONTENTS: HAND CONTROLLER, 3-WAY HIGH-PRESSURE STOPCOCK WITH ROTATING END AND PREMIUM HIGH-PRESSURE TUBING: Brand: ANGIOTOUCH® KIT

## (undated) DEVICE — PAD, DEFIB, ADULT, RADIOTRANS, PHYSIO: Brand: MEDLINE

## (undated) DEVICE — HI-TORQUE WHISPER ES GUIDE WIRE .014 STRAIGHTTIP 3.0 CM X 190 CM: Brand: HI-TORQUE WHISPER

## (undated) DEVICE — PK EXTRM 30

## (undated) DEVICE — RADIFOCUS OPTITORQUE ANGIOGRAPHIC CATHETER: Brand: OPTITORQUE

## (undated) DEVICE — PK CATH CARD 30 CA/4

## (undated) DEVICE — GLIDESHEATH SLENDER STAINLESS STEEL KIT: Brand: GLIDESHEATH SLENDER

## (undated) DEVICE — FRAZIER SUCTION INSTRUMENT 10 FR W/CONTROL VENT & OBTURATOR: Brand: FRAZIER

## (undated) DEVICE — DEV TORQ GW HOT/PINK

## (undated) DEVICE — UNDERCAST PADDING: Brand: DEROYAL

## (undated) DEVICE — SUT ETHLN 3/0 FS1 30IN 669H

## (undated) DEVICE — PRECISION THIN (9.0 X 0.38 X 25.0MM)

## (undated) DEVICE — 4.0MM EGG BUR

## (undated) DEVICE — TRAP FLD MINIVAC MEGADYNE 100ML

## (undated) DEVICE — TR BAND RADIAL ARTERY COMPRESSION DEVICE: Brand: TR BAND

## (undated) DEVICE — BNDG CURAD ADHS 4IN WHT

## (undated) DEVICE — MODEL BT2000 P/N 700287-012KIT CONTENTS: MANIFOLD WITH SALINE AND CONTRAST PORTS, SALINE TUBING WITH SPIKE AND HAND SYRINGE, TRANSDUCER: Brand: BT2000 AUTOMATED MANIFOLD KIT

## (undated) DEVICE — CANN NASL ETCO2 LO/FLO A/

## (undated) DEVICE — 6F .070 XB LAD 3.5 100CM: Brand: VISTA BRITE TIP

## (undated) DEVICE — GW STARTER FXD CORE J .035 3X260CM 3MM

## (undated) DEVICE — CATH F5 INF 3DRC 100CM: Brand: INFINITI

## (undated) DEVICE — SOLIDIFIER LIQUI LOC PLUS 2000CC

## (undated) DEVICE — BNDG ELAS W/CLIP 6IN 10YD LF STRL

## (undated) DEVICE — CVR UNIV C/ARM

## (undated) DEVICE — SPNG GZ WOVN 4X4IN 12PLY 10/BX STRL

## (undated) DEVICE — MINI TREK CORONARY DILATATION CATHETER 2.0 MM X 15 MM / RAPID-EXCHANGE: Brand: MINI TREK

## (undated) DEVICE — GW PRESSUREWIRE X WIRELESS FFR 175CM

## (undated) DEVICE — BANDAGE,GAUZE,BULKEE II,4.5"X4.1YD,STRL: Brand: MEDLINE

## (undated) DEVICE — APPL DURAPREP IODOPHOR APL 26ML

## (undated) DEVICE — INFLATION DEVICE: Brand: ENCORE™ 26

## (undated) DEVICE — DRAPE,ANGIO,BRACH,STERILE,38X44: Brand: MEDLINE

## (undated) DEVICE — KT NDL GUIDE STRL 18GA

## (undated) DEVICE — TOOL INSRT GW MTL OR PLSTC

## (undated) DEVICE — A2000 MULTI-USE SYRINGE KIT, P/N 701277-003KIT CONTENTS: 100ML CONTRAST RESERVOIR AND TUBING WITH CONTRAST SPIKE AND CLAMP: Brand: A2000 MULTI-USE SYRINGE KIT

## (undated) DEVICE — INTENDED FOR TISSUE SEPARATION, AND OTHER PROCEDURES THAT REQUIRE A SHARP SURGICAL BLADE TO PUNCTURE OR CUT.: Brand: BARD-PARKER ® STAINLESS STEEL BLADES

## (undated) DEVICE — HI-TORQUE POWERTURN FLEX GUIDE WIRE W/HYDROPHILIC COATING .014" STRAIGHT TIP 190 CM: Brand: HI-TORQUE POWERTURN

## (undated) DEVICE — COPILOT BLEEDBACK CONTROL VALVE: Brand: COPILOT

## (undated) DEVICE — PATIENT RETURN ELECTRODE, SINGLE-USE, CONTACT QUALITY MONITORING, ADULT, WITH 9FT CORD, FOR PATIENTS WEIGING OVER 33LBS. (15KG): Brand: MEGADYNE

## (undated) DEVICE — GLV SURG SENSICARE PI ORTHO SZ8 LF STRL

## (undated) DEVICE — 4-PORT MANIFOLD: Brand: NEPTUNE 2

## (undated) DEVICE — CVR BRD ARM 13X30

## (undated) DEVICE — DRESSING,GAUZE,XEROFORM,CURAD,5"X9",ST: Brand: CURAD